# Patient Record
Sex: FEMALE | Race: BLACK OR AFRICAN AMERICAN | NOT HISPANIC OR LATINO | Employment: FULL TIME | URBAN - METROPOLITAN AREA
[De-identification: names, ages, dates, MRNs, and addresses within clinical notes are randomized per-mention and may not be internally consistent; named-entity substitution may affect disease eponyms.]

---

## 2017-01-11 ENCOUNTER — GENERIC CONVERSION - ENCOUNTER (OUTPATIENT)
Dept: OTHER | Facility: OTHER | Age: 22
End: 2017-01-11

## 2017-04-03 ENCOUNTER — ALLSCRIPTS OFFICE VISIT (OUTPATIENT)
Dept: OTHER | Facility: OTHER | Age: 22
End: 2017-04-03

## 2017-04-06 ENCOUNTER — ALLSCRIPTS OFFICE VISIT (OUTPATIENT)
Dept: OTHER | Facility: OTHER | Age: 22
End: 2017-04-06

## 2017-05-26 ENCOUNTER — ALLSCRIPTS OFFICE VISIT (OUTPATIENT)
Dept: OTHER | Facility: OTHER | Age: 22
End: 2017-05-26

## 2017-05-26 ENCOUNTER — HOSPITAL ENCOUNTER (OUTPATIENT)
Dept: RADIOLOGY | Facility: MEDICAL CENTER | Age: 22
Discharge: HOME | End: 2017-05-26
Payer: COMMERCIAL

## 2017-05-26 DIAGNOSIS — M25.562 PAIN IN LEFT KNEE: ICD-10-CM

## 2017-05-26 PROCEDURE — 73564 X-RAY EXAM KNEE 4 OR MORE: CPT

## 2017-05-30 ENCOUNTER — GENERIC CONVERSION - ENCOUNTER (OUTPATIENT)
Dept: OTHER | Facility: OTHER | Age: 22
End: 2017-05-30

## 2017-05-31 ENCOUNTER — ALLSCRIPTS OFFICE VISIT (OUTPATIENT)
Dept: OTHER | Facility: OTHER | Age: 22
End: 2017-05-31

## 2017-06-01 ENCOUNTER — HOSPITAL ENCOUNTER (EMERGENCY)
Facility: HOSPITAL | Age: 22
Discharge: HOME | End: 2017-06-01
Attending: EMERGENCY MEDICINE | Admitting: EMERGENCY MEDICINE
Payer: OTHER MISCELLANEOUS

## 2017-06-01 ENCOUNTER — APPOINTMENT (EMERGENCY)
Dept: RADIOLOGY | Facility: HOSPITAL | Age: 22
End: 2017-06-01
Payer: OTHER MISCELLANEOUS

## 2017-06-01 VITALS
SYSTOLIC BLOOD PRESSURE: 157 MMHG | BODY MASS INDEX: 23.54 KG/M2 | DIASTOLIC BLOOD PRESSURE: 84 MMHG | RESPIRATION RATE: 18 BRPM | WEIGHT: 150 LBS | HEIGHT: 67 IN | OXYGEN SATURATION: 100 % | TEMPERATURE: 98.2 F | HEART RATE: 75 BPM

## 2017-06-01 DIAGNOSIS — S63.636A SPRAIN OF INTERPHALANGEAL JOINT OF RIGHT LITTLE FINGER, INITIAL ENCOUNTER: Primary | ICD-10-CM

## 2017-06-01 PROCEDURE — 73140 X-RAY EXAM OF FINGER(S): CPT

## 2017-06-01 PROCEDURE — 99283 EMERGENCY DEPT VISIT LOW MDM: CPT

## 2017-06-01 RX ORDER — EPINEPHRINE 0.3 MG/.3ML
0.3 INJECTION SUBCUTANEOUS AS NEEDED
COMMUNITY
Start: 2016-11-28 | End: 2020-10-21 | Stop reason: SDUPTHER

## 2017-06-01 RX ORDER — ALBUTEROL SULFATE 90 UG/1
1 AEROSOL, METERED RESPIRATORY (INHALATION) AS NEEDED
COMMUNITY
End: 2018-04-09 | Stop reason: SDUPTHER

## 2017-06-01 RX ORDER — FLUTICASONE PROPIONATE 50 MCG
1 SPRAY, SUSPENSION (ML) NASAL DAILY
COMMUNITY
Start: 2016-11-28 | End: 2018-04-09 | Stop reason: SDUPTHER

## 2017-06-01 RX ORDER — IBUPROFEN 600 MG/1
600 TABLET ORAL ONCE
Status: COMPLETED | OUTPATIENT
Start: 2017-06-01 | End: 2017-06-01

## 2017-06-01 RX ADMIN — IBUPROFEN 600 MG: 600 TABLET ORAL at 01:29

## 2017-06-05 ENCOUNTER — APPOINTMENT (OUTPATIENT)
Dept: URGENT CARE | Facility: MEDICAL CENTER | Age: 22
End: 2017-06-05
Payer: OTHER MISCELLANEOUS

## 2017-06-05 PROCEDURE — 99283 EMERGENCY DEPT VISIT LOW MDM: CPT

## 2017-06-05 PROCEDURE — G0382 LEV 3 HOSP TYPE B ED VISIT: HCPCS

## 2017-08-28 ENCOUNTER — ALLSCRIPTS OFFICE VISIT (OUTPATIENT)
Dept: OTHER | Facility: OTHER | Age: 22
End: 2017-08-28

## 2017-08-28 DIAGNOSIS — Z34.90 ENCOUNTER FOR SUPERVISION OF NORMAL PREGNANCY: ICD-10-CM

## 2017-08-28 DIAGNOSIS — N92.1 EXCESSIVE AND FREQUENT MENSTRUATION WITH IRREGULAR CYCLE: ICD-10-CM

## 2017-08-28 LAB — HCG, QUALITATIVE (HISTORICAL): POSITIVE

## 2017-09-01 ENCOUNTER — GENERIC CONVERSION - ENCOUNTER (OUTPATIENT)
Dept: OTHER | Facility: OTHER | Age: 22
End: 2017-09-01

## 2017-09-07 ENCOUNTER — GENERIC CONVERSION - ENCOUNTER (OUTPATIENT)
Dept: OTHER | Facility: OTHER | Age: 22
End: 2017-09-07

## 2017-09-19 ENCOUNTER — ALLSCRIPTS OFFICE VISIT (OUTPATIENT)
Dept: OTHER | Facility: OTHER | Age: 22
End: 2017-09-19

## 2017-09-25 ENCOUNTER — GENERIC CONVERSION - ENCOUNTER (OUTPATIENT)
Dept: OTHER | Facility: OTHER | Age: 22
End: 2017-09-25

## 2017-09-25 ENCOUNTER — ALLSCRIPTS OFFICE VISIT (OUTPATIENT)
Dept: OTHER | Facility: OTHER | Age: 22
End: 2017-09-25

## 2017-09-28 ENCOUNTER — GENERIC CONVERSION - ENCOUNTER (OUTPATIENT)
Dept: OTHER | Facility: OTHER | Age: 22
End: 2017-09-28

## 2017-10-03 ENCOUNTER — GENERIC CONVERSION - ENCOUNTER (OUTPATIENT)
Dept: OTHER | Facility: OTHER | Age: 22
End: 2017-10-03

## 2017-10-03 ENCOUNTER — ALLSCRIPTS OFFICE VISIT (OUTPATIENT)
Dept: PERINATAL CARE | Facility: MEDICAL CENTER | Age: 22
End: 2017-10-03
Payer: COMMERCIAL

## 2017-10-03 PROCEDURE — 76813 OB US NUCHAL MEAS 1 GEST: CPT | Performed by: OBSTETRICS & GYNECOLOGY

## 2017-10-11 ENCOUNTER — GENERIC CONVERSION - ENCOUNTER (OUTPATIENT)
Dept: OTHER | Facility: OTHER | Age: 22
End: 2017-10-11

## 2017-10-19 ENCOUNTER — GENERIC CONVERSION - ENCOUNTER (OUTPATIENT)
Dept: OTHER | Facility: OTHER | Age: 22
End: 2017-10-19

## 2017-10-23 ENCOUNTER — ALLSCRIPTS OFFICE VISIT (OUTPATIENT)
Dept: OTHER | Facility: OTHER | Age: 22
End: 2017-10-23

## 2017-10-27 LAB
CHLAMYDIA TRACHOMATIS BY MOL. METHOD (HISTORICAL): DETECTED
GC BY MOL. METHOD (HISTORICAL): NOT DETECTED

## 2017-10-30 ENCOUNTER — APPOINTMENT (OUTPATIENT)
Dept: LAB | Facility: MEDICAL CENTER | Age: 22
End: 2017-10-30
Payer: COMMERCIAL

## 2017-10-30 ENCOUNTER — TRANSCRIBE ORDERS (OUTPATIENT)
Dept: ADMINISTRATIVE | Facility: HOSPITAL | Age: 22
End: 2017-10-30

## 2017-10-30 DIAGNOSIS — Z3A.17 17 WEEKS GESTATION OF PREGNANCY: Primary | ICD-10-CM

## 2017-10-30 PROCEDURE — 36415 COLL VENOUS BLD VENIPUNCTURE: CPT | Performed by: OBSTETRICS & GYNECOLOGY

## 2017-10-31 LAB — SCAN RESULT: NORMAL

## 2017-11-01 ENCOUNTER — GENERIC CONVERSION - ENCOUNTER (OUTPATIENT)
Dept: OTHER | Facility: OTHER | Age: 22
End: 2017-11-01

## 2017-11-19 ENCOUNTER — OFFICE VISIT (OUTPATIENT)
Dept: URGENT CARE | Facility: MEDICAL CENTER | Age: 22
End: 2017-11-19
Payer: COMMERCIAL

## 2017-11-19 DIAGNOSIS — N39.0 URINARY TRACT INFECTION: ICD-10-CM

## 2017-11-19 DIAGNOSIS — Z34.92 ENCOUNTER FOR SUPERVISION OF NORMAL PREGNANCY IN SECOND TRIMESTER: ICD-10-CM

## 2017-11-19 PROCEDURE — G0382 LEV 3 HOSP TYPE B ED VISIT: HCPCS

## 2017-11-19 PROCEDURE — 81002 URINALYSIS NONAUTO W/O SCOPE: CPT

## 2017-11-20 ENCOUNTER — GENERIC CONVERSION - ENCOUNTER (OUTPATIENT)
Dept: OTHER | Facility: OTHER | Age: 22
End: 2017-11-20

## 2017-11-20 ENCOUNTER — APPOINTMENT (OUTPATIENT)
Dept: PERINATAL CARE | Facility: CLINIC | Age: 22
End: 2017-11-20
Payer: COMMERCIAL

## 2017-11-20 ENCOUNTER — APPOINTMENT (OUTPATIENT)
Dept: LAB | Facility: HOSPITAL | Age: 22
End: 2017-11-20
Payer: COMMERCIAL

## 2017-11-20 DIAGNOSIS — N39.0 URINARY TRACT INFECTION: ICD-10-CM

## 2017-11-20 PROCEDURE — 76805 OB US >/= 14 WKS SNGL FETUS: CPT | Performed by: OBSTETRICS & GYNECOLOGY

## 2017-11-20 PROCEDURE — 76817 TRANSVAGINAL US OBSTETRIC: CPT | Performed by: OBSTETRICS & GYNECOLOGY

## 2017-11-20 PROCEDURE — 87086 URINE CULTURE/COLONY COUNT: CPT

## 2017-11-21 LAB — BACTERIA UR CULT: NORMAL

## 2017-11-22 NOTE — PROGRESS NOTES
Assessment    1  Urinary tract infection (599 0) (N39 0)    Plan  Acute UTI (urinary tract infection)    · (1) URINE CULTURE; Source:Urine, Clean Catch; Status:Active - Retrospective ByProtocol Authorization; Requested for:19Nov2017;   Urinary tract infection    · Nitrofurantoin Monohyd Macro 100 MG Oral Capsule (Macrobid); TAKE 1 CAPSULEEVERY 12 HOURS DAILY    Discussion/Summary  Discussion Summary:   1  Take Macrobid 100mg  1 tablet twice daily x 7 daysPush fluids3  Follow-up with PCP if symptoms persist    Medication Side Effects Reviewed: Possible side effects of new medications were reviewed with the patient/guardian today  Understands and agrees with treatment plan: The treatment plan was reviewed with the patient/guardian  The patient/guardian understands and agrees with the treatment plan      Chief Complaint    1  Pain  Chief Complaint Free Text Note Form: Presents with burning with urination and itching since Friday  History of Present Illness  HPI: The patient is a 72-year-old female who presents with pain painful urination x3 days  She denies any fever, abdominal or back pain, she denies any visible blood in her urine  The patient is currently 20 weeks pregnant and allergic to penicillins  Hospital Based Practices Required Assessment:  Pain Assessment  the patient states they have pain  The pain is located in the urinary  The patient describes the pain as burning  (on a scale of 0 to 10, the patient rates the pain at 8 )  Abuse And Domestic Violence Screen   Yes, the patient is safe at home  -- The patient states no one is hurting them  Depression And Suicide Screen  No, the patient has not had thoughts of hurting themself  No, the patient has not felt depressed in the past 7 days  Prefered Language is  ENglish  Primary Language is  English  Readiness To Learn: Receptive  Barriers To Learning: none    Preferred Learning: verbal      Review of Systems  Focused-Female:  Constitutional: No fever, no chills, feels well, no tiredness, no recent weight gain or loss  Genitourinary: dysuria  Active Problems  1  Acne (706 1) (L70 9)   2  Asthma (493 90) (J45 909)   3  Bee sting-induced anaphylaxis (989 5,E905 3) (T63 441A)   4  Chlamydia (079 98) (A74 9)   5  Chronic pain of left knee (222 12,087 88) (M25 562,G89 29)   6  Dry skin dermatitis (692 89) (L85 3)   7  Encounter for supervision of other normal pregnancy in first trimester (V22 1) (Z34 81)   8  Seasonal allergies (477 9) (J30 2)   9  Unplanned pregnancy (V22 2) (Z34 90)    Past Medical History  1  Asthma (493 90) (J45 909)   2  Bee sting-induced anaphylaxis (989 5,E905 3) (T63 441A)   3  Chronic pain of left knee (796 49,496 11) (M25 562,G89 29)   4  Dry skin dermatitis (692 89) (L85 3)   5  History of dysmenorrhea (V13 29) (Z87 42)   6  Seasonal allergies (477 9) (J30 2)  Active Problems And Past Medical History Reviewed: The active problems and past medical history were reviewed and updated today  Family History  Mother    1  Family history of hypertension (V17 49) (Z82 49)  Father    2  No pertinent family history  Family History Reviewed: The family history was reviewed and updated today  Social History     · Always uses seat belt   · Denied: History of Caffeine use   · Denied: History of Drug use   · Exercises regularly   · Denied: History of    · Never a smoker   · Nexplanon in place (V45 52) (Z97 5)   · No alcohol use   · Sexually active   · Single  Social History Reviewed: The social history was reviewed and updated today  Surgical History  Surgical History Reviewed: The surgical history was reviewed and updated today  Current Meds   1  EpiPen 2-James 0 3 MG/0 3ML Injection Solution Auto-injector; INJECT 0 3ML INTRAMUSCULARLY AS DIRECTED; Therapy: [id number] to (Last Rx:2016)  Requested for: 2016 Ordered   2   Fluticasone Propionate 50 MCG/ACT Nasal Suspension; USE 2 SPRAYS IN EACH NOSTRIL ONCE DAILY; Therapy: 61FKI3504 to (Last Rx:2016)  Requested for: 2016 Ordered   3  Prenatal TABS; Therapy: [phone] to Recorded   4  ProAir RespiClick 015 (90 Base) MCG/ACT Inhalation Aerosol Powder Breath Activated; Inhale 2 puffs every 4-6 hours as needed for cough or wheezing; Therapy: 65UET5852 to (Last Rx:2016)  Requested for: 2016 Ordered  Medication List Reviewed: The medication list was reviewed and updated today  Allergies  1  Penicillins    2  Bee sting   3  No Known Food Allergies    Vitals  Signs   Recorded: 80ORZ6309 04:37PM   Temperature: 98 6 F, Temporal  Heart Rate: 61  Pulse Quality: Normal  Respiration Quality: Normal  Respiration: 18  Systolic: 940, Sitting  Diastolic: 82, Sitting  O2 Saturation: 99, RA    Physical Exam   Constitutional  General appearance: No acute distress, well appearing and well nourished  Pulmonary  Respiratory effort: No increased work of breathing or signs of respiratory distress  Auscultation of lungs: Clear to auscultation  Cardiovascular  Auscultation of heart: Normal rate and rhythm, normal S1 and S2, without murmurs  Abdomen  Abdomen: Abnormal  -- The abdomen is soft and nondistended, positive bowel sounds in all quadrants  There is some slight tenderness to palpation over the suprapubic region  No CVA tenderness  Liver and spleen: No hepatomegaly or splenomegaly  Future Appointments    Date/Time Provider Specialty Site   2017 01:00 PM Yonis Ojeda [facility] Obstetrics/Gynecology [facility] Pending sale to [facility]   2017 10:00 AM  Mehreen Ramos, [address] [place] [address] Road   2017 01:40 PM JESSY Martini   Obstetrics/Gynecology [facility] OB/[facility] Pending sale to [facility]       Signatures   Electronically signed by : Janna Covington [facility]; 2017  4:55PM EST                       (Author)    Electronically signed by : ALEXANDRA Ramirez ; 2017 10:30AM EST (Co-author)

## 2017-12-14 ENCOUNTER — ALLSCRIPTS OFFICE VISIT (OUTPATIENT)
Dept: OTHER | Facility: OTHER | Age: 22
End: 2017-12-14

## 2017-12-17 LAB
CHLAMYDIA TRACHOMATIS BY MOL. METHOD (HISTORICAL): NOT DETECTED
GC BY MOL. METHOD (HISTORICAL): NOT DETECTED

## 2018-01-08 ENCOUNTER — APPOINTMENT (OUTPATIENT)
Dept: LAB | Facility: MEDICAL CENTER | Age: 23
End: 2018-01-08
Payer: COMMERCIAL

## 2018-01-08 DIAGNOSIS — Z34.92 ENCOUNTER FOR SUPERVISION OF NORMAL PREGNANCY IN SECOND TRIMESTER: ICD-10-CM

## 2018-01-08 LAB
BASOPHILS # BLD AUTO: 0.02 THOUSANDS/ΜL (ref 0–0.1)
BASOPHILS NFR BLD AUTO: 0 % (ref 0–1)
EOSINOPHIL # BLD AUTO: 0.21 THOUSAND/ΜL (ref 0–0.61)
EOSINOPHIL NFR BLD AUTO: 3 % (ref 0–6)
ERYTHROCYTE [DISTWIDTH] IN BLOOD BY AUTOMATED COUNT: 14.6 % (ref 11.6–15.1)
GLUCOSE 1H P 50 G GLC PO SERPL-MCNC: 79 MG/DL
HCT VFR BLD AUTO: 37.1 % (ref 34.8–46.1)
HGB BLD-MCNC: 13 G/DL (ref 11.5–15.4)
LYMPHOCYTES # BLD AUTO: 1.86 THOUSANDS/ΜL (ref 0.6–4.47)
LYMPHOCYTES NFR BLD AUTO: 25 % (ref 14–44)
MCH RBC QN AUTO: 31.9 PG (ref 26.8–34.3)
MCHC RBC AUTO-ENTMCNC: 35 G/DL (ref 31.4–37.4)
MCV RBC AUTO: 91 FL (ref 82–98)
MONOCYTES # BLD AUTO: 0.68 THOUSAND/ΜL (ref 0.17–1.22)
MONOCYTES NFR BLD AUTO: 9 % (ref 4–12)
NEUTROPHILS # BLD AUTO: 4.78 THOUSANDS/ΜL (ref 1.85–7.62)
NEUTS SEG NFR BLD AUTO: 63 % (ref 43–75)
NRBC BLD AUTO-RTO: 0 /100 WBCS
PLATELET # BLD AUTO: 171 THOUSANDS/UL (ref 149–390)
PMV BLD AUTO: 11.4 FL (ref 8.9–12.7)
RBC # BLD AUTO: 4.08 MILLION/UL (ref 3.81–5.12)
WBC # BLD AUTO: 7.6 THOUSAND/UL (ref 4.31–10.16)

## 2018-01-08 PROCEDURE — 82950 GLUCOSE TEST: CPT

## 2018-01-08 PROCEDURE — 86592 SYPHILIS TEST NON-TREP QUAL: CPT

## 2018-01-08 PROCEDURE — 85025 COMPLETE CBC W/AUTO DIFF WBC: CPT

## 2018-01-08 PROCEDURE — 36415 COLL VENOUS BLD VENIPUNCTURE: CPT

## 2018-01-09 LAB — RPR SER QL: NORMAL

## 2018-01-09 NOTE — RESULT NOTES
Verified Results  * XR KNEE 4+ VW LEFT INJURY [id number] 10:31AM Geovanna Luciano Order Number: [id number]     Test Name Result Flag Reference   XR KNEE 4+ VW LEFT (Report)     LEFT KNEE     INDICATION: Remote history of injury persistent pain     COMPARISON: None     VIEWS: 4     IMAGES: 4     FINDINGS:     There is no acute fracture or dislocation  There is no joint effusion  No degenerative changes  No lytic or blastic lesions are seen  Soft tissues are unremarkable         IMPRESSION:     Normal left knee exam       Workstation performed: [id number]     Signed by:   Pavel Colin MD   5/28/17

## 2018-01-09 NOTE — MISCELLANEOUS
Message   Recorded as Task   Date: 09/06/2017 04:40 PM, Created By: Florinda Tamayo   Task Name: Call Back   Assigned To: Darnell Nowak   Regarding Patient: Tessa Smiley, Status: In Progress   Comment:    Kaylie Miller - 06 Sep 2017 4:40 PM     TASK CREATED  Caller: Self; Results Inquiry; [phone] (Home)  pt would like her blood work resultswhicy were done at [facility],  pts # [phone] - 07 Sep 2017 7:21 AM     TASK EDITED  called pt- L/M on voicemail for pt to return call, for bld wk results,  prior to 330pm today   TatumSiddharth - 07 Sep 2017 7:22 AM     TASK IN PROGRESS   TatumSiddharth - 07 Sep 2017 12:45 PM     TASK EDITED  called pt- informed of lab results- quant is > 65,000   pt also had a +UPT in office  pt states she is going to continue with pregnancy  - has an apt on 9/19/17 for u s  Active Problems    1  Acne (706 1) (L70 9)   2  Asthma (493 90) (J45 909)   3  Bee sting-induced anaphylaxis (989 5,E905 3) (T63 441A)   4  Chronic pain of left knee (672 46,497 81) (M25 562,G89 29)   5  Dry skin dermatitis (692 89) (L85 3)   6  Dysmenorrhea (625 3) (N94 6)   7  Dyspareunia, female (625 0) (N94 10)   8  Encounter for management and injection of depo-Provera (V25 49) (Z30 42)   9  Irregular intermenstrual bleeding (626 6) (N92 1)   10  Seasonal allergies (477 9) (J30 2)    Current Meds   1  Clindamycin Phos-Benzoyl Perox 1-5 % External Gel (BenzaClin); APPLY AND GENTLY   MASSAGE INTO AFFECTED AREA(S) ONCE DAILY; Therapy: 79MZP5776 to (Last Rx:28Nov2016)  Requested for: 28Nov2016 Ordered   2  EpiPen 2-James 0 3 MG/0 3ML Injection Solution Auto-injector; INJECT 0 3ML   INTRAMUSCULARLY AS DIRECTED; Therapy: 96OAZ9756 to (Last Rx:28Nov2016)  Requested for: 28Nov2016 Ordered   3  Fluticasone Propionate 50 MCG/ACT Nasal Suspension; USE 2 SPRAYS IN EACH   NOSTRIL ONCE DAILY; Therapy: 35ELX5299 to (Last Rx:28Nov2016)  Requested for: 28Nov2016 Ordered   4   ProAir RespiClick 264 (90 Base) MCG/ACT Inhalation Aerosol Powder Breath Activated; Inhale 2 puffs every 4-6 hours as needed for cough or wheezing; Therapy: 62IGJ6498 to (Last Rx:28Nov2016)  Requested for: 28Nov2016 Ordered    Allergies    1  Penicillins    2   Bee sting    Signatures   Electronically signed by : Wai Borges RN; Sep  7 2017 12:47PM EST                       (Author)

## 2018-01-10 ENCOUNTER — HOSPITAL ENCOUNTER (EMERGENCY)
Facility: HOSPITAL | Age: 23
End: 2018-01-10
Attending: EMERGENCY MEDICINE
Payer: COMMERCIAL

## 2018-01-10 ENCOUNTER — HOSPITAL ENCOUNTER (OUTPATIENT)
Facility: HOSPITAL | Age: 23
Discharge: HOME | End: 2018-01-10
Attending: OBSTETRICS & GYNECOLOGY | Admitting: OBSTETRICS & GYNECOLOGY
Payer: COMMERCIAL

## 2018-01-10 ENCOUNTER — GENERIC CONVERSION - ENCOUNTER (OUTPATIENT)
Dept: OTHER | Facility: OTHER | Age: 23
End: 2018-01-10

## 2018-01-10 VITALS
HEART RATE: 93 BPM | WEIGHT: 162 LBS | RESPIRATION RATE: 19 BRPM | DIASTOLIC BLOOD PRESSURE: 67 MMHG | TEMPERATURE: 97.7 F | OXYGEN SATURATION: 98 % | BODY MASS INDEX: 24.55 KG/M2 | HEIGHT: 68 IN | SYSTOLIC BLOOD PRESSURE: 127 MMHG

## 2018-01-10 VITALS
SYSTOLIC BLOOD PRESSURE: 109 MMHG | HEART RATE: 73 BPM | TEMPERATURE: 98 F | DIASTOLIC BLOOD PRESSURE: 71 MMHG | RESPIRATION RATE: 18 BRPM

## 2018-01-10 DIAGNOSIS — O36.8190 DECREASED FETAL MOVEMENT: Primary | ICD-10-CM

## 2018-01-10 DIAGNOSIS — Z3A.27 27 WEEKS GESTATION OF PREGNANCY: ICD-10-CM

## 2018-01-10 PROBLEM — O09.30 INSUFFICIENT PRENATAL CARE: Status: ACTIVE | Noted: 2018-01-10

## 2018-01-10 PROCEDURE — 99284 EMERGENCY DEPT VISIT MOD MDM: CPT

## 2018-01-10 PROCEDURE — 99214 OFFICE O/P EST MOD 30 MIN: CPT

## 2018-01-10 PROCEDURE — 59025 FETAL NON-STRESS TEST: CPT | Performed by: OBSTETRICS & GYNECOLOGY

## 2018-01-10 PROCEDURE — 76815 OB US LIMITED FETUS(S): CPT | Performed by: OBSTETRICS & GYNECOLOGY

## 2018-01-10 NOTE — PROGRESS NOTES
Triage Note - OB  Anna Marte 23 y o  female MRN: [id number]  Unit/Bed#: LD Triage 2 Encounter: [id number]    Chief Complaint:   Chief Complaint   Patient presents with    Decreased Fetal Movement     since  yesterday     LICO: Estimated Date of Delivery: 18    HPI: 23 y o  female  at 27w3d presents with decreased fetal movement without feeling any movements at home  Pt is feeling some movements in triage  No LOF, no VB, no CTX  Vitals: Blood pressure 109/71, pulse 73, temperature 98 °F (36 7 °C), temperature source Oral, resp  rate 18, last menstrual period 2017  ,There is no height or weight on file to calculate BMI  Physical Exam  GEN: well developed and well nourished, alert, oriented times 3 and appears comfortable  Abd: soft, non tender and gravid  SVE: deferred  Abdominal u/s: vtx, anterior placenta, TU 16 9 (LVP 6 4), good fetal movements noted with patient only feeling ~25% of movements seen    FHR: 125, mod variability, +10x10 accels, no decels  Appropriate for gestational age  [de-identified]: no ctx    Labs:   No visits with results within 1 Day(s) from this visit     Latest known visit with results is:   Appointment on 2018   Component Date Value    Glucose 2018 79     RPR 2018 Non-Reactive     WBC 2018 7 60     RBC 2018 4 08     Hemoglobin 2018 13 0     Hematocrit 2018 37 1     MCV 2018 91     MCH 2018 31 9     MCHC 2018 35 0     RDW 2018 14 6     MPV 2018 11 4     Platelets  171     nRBC 2018 0     Neutrophils Relative 2018 63     Lymphocytes Relative 2018 25     Monocytes Relative 2018 9     Eosinophils Relative 2018 3     Basophils Relative 2018 0     Neutrophils Absolute 2018 4 78     Lymphocytes Absolute 2018 1 86     Monocytes Absolute 2018 0 68     Eosinophils Absolute 2018 0 21     Basophils Absolute 2018 0 02        Lab, Imaging and other studies: I have personally reviewed pertinent reports  A/P: 23 y o  female  at 27 2 wk, reassuring fetal status  1) Discharge instructions given to patient and labor precautions reviewed   D/w Dr Italo Horvath

## 2018-01-10 NOTE — ED PROVIDER NOTES
History  Chief Complaint   Patient presents with    Pregnancy Problem     pt ststes " I havent feel any movements for the last 24 hours and I am 27 wks pregnant"  Pt denies abd pain or other symptoms  Pt is a 19-year-old female  27 weeks with regular prenatal care presenting with chief complaint of decreased fetal motion  Patient is here with family member and reports that she has not felt the baby move her kick in 24 hours this is abnormal for her she typically feels movement every few hours  She has seen upon arriving to the emergency department or bedside ultrasound was placed patient had fetal heart rate of 140-150, positive fetal motion  Patient called her Ob it was instructed to go to at [place] but she came here instead  Mathew was called and instructed transfer down to [place] for fetal monitoring and further evaluation  The patient has very limited transportation and is agreeable to transfer by ambulance at this time will transfer hospitals otherwise she denies fevers viral symptoms headache chest pain cough shortness of breath new nausea vomiting anorexia abdominal pain flank or back pain pelvic pain cramping vaginal bleeding discharge urinary symptoms loss of water leg swelling calf pain tenderness rashes or other associated symptoms  Complete review systems otherwise negative            Prior to Admission Medications   Prescriptions Last Dose Informant Patient Reported? Taking?    Albuterol Sulfate (PROAIR RESPICLICK) 523 (90 Base) MCG/ACT AEPB   Yes No   Sig: Inhale 1 puff as needed   EPINEPHrine (EPIPEN 2-JANE) 0 3 mg/0 3 mL SOAJ   Yes No   Sig: Inject 0 3 mL as directed as needed   Norethin-Eth Estrad-Fe Biphas (LO LOESTRIN FE) 1 MG-10 MCG / 10 MCG TABS   Yes No   Sig: Take 1 mg by mouth daily   albuterol (PROVENTIL HFA,VENTOLIN HFA) 90 mcg/act inhaler   Yes No   Sig: Inhale 1 puff as needed   fluticasone (FLONASE) 50 mcg/act nasal spray   Yes No   Si spray into each nostril daily Facility-Administered Medications: None       Past Medical History:   Diagnosis Date    Asthma        History reviewed  No pertinent surgical history  History reviewed  No pertinent family history  I have reviewed and agree with the history as documented  Social History   Substance Use Topics    Smoking status: Never Smoker    Smokeless tobacco: Never Used    Alcohol use No        Review of Systems   Constitutional: Negative for chills and fever  HENT: Negative for rhinorrhea and sore throat  Respiratory: Negative for cough and shortness of breath  Cardiovascular: Negative for chest pain and palpitations  Gastrointestinal: Negative for abdominal pain, diarrhea, nausea and vomiting  Genitourinary: Negative for difficulty urinating, dysuria, flank pain, frequency, hematuria, menstrual problem, pelvic pain, urgency, vaginal bleeding, vaginal discharge and vaginal pain  Decreased fetal motion   Musculoskeletal: Negative for arthralgias, back pain and myalgias  Skin: Negative for color change and rash  Neurological: Negative for dizziness and weakness  Hematological: Negative for adenopathy  Does not bruise/bleed easily  Psychiatric/Behavioral: Negative for agitation and behavioral problems  All other systems reviewed and are negative  Physical Exam  ED Triage Vitals [01/10/18 1256]   Temperature Pulse Respirations Blood Pressure SpO2   97 7 °F (36 5 °C) 80 20 133/60 100 %      Temp Source Heart Rate Source Patient Position - Orthostatic VS BP Location FiO2 (%)   Temporal Monitor Sitting Left arm --      Pain Score       No Pain           Orthostatic Vital Signs  Vitals:    01/10/18 1256 01/10/18 1310 01/10/18 1425   BP: 133/60 131/67 127/67   Pulse: 80 88 93   Patient Position - Orthostatic VS: Sitting Lying        Physical Exam   Constitutional: She is oriented to person, place, and time  She appears well-developed and well-nourished  No distress     Well-appearing pleasant conversational no acute distress   HENT:   Head: Normocephalic and atraumatic  Eyes: EOM are normal  Pupils are equal, round, and reactive to light  Neck: Normal range of motion  Neck supple  No tracheal deviation present  Cardiovascular: Normal rate, regular rhythm and normal heart sounds  Exam reveals no gallop and no friction rub  No murmur heard  Pulmonary/Chest: Effort normal and breath sounds normal  She has no wheezes  She has no rales  Abdominal: Soft  Bowel sounds are normal  She exhibits no distension  There is no tenderness  There is no rebound and no guarding  Gravid uterus abdomen soft nontender   Musculoskeletal: Normal range of motion  She exhibits no edema or tenderness  Neurological: She is alert and oriented to person, place, and time  No cranial nerve deficit  She exhibits normal muscle tone  Coordination normal    Skin: Skin is warm and dry  No rash noted  Psychiatric: She has a normal mood and affect  Her behavior is normal    Nursing note and vitals reviewed        ED Medications  Medications - No data to display    Diagnostic Studies  Results Reviewed     None                 No orders to display              Procedures  Procedures       Phone Contacts  ED Phone Contact    ED Course  ED Course as of Siddharth 10 1446   Wed Siddharth 10, 2018   1333  Patient be transferred to [facility] for fetal monitoring and further evaluation bedside ultrasound performed demonstrated fetal heart rate between 140-150 beats per minute intermittent fetal movement                                MDM  Number of Diagnoses or Management Options  Decreased fetal movement:   Diagnosis management comments: 57-year-old female  27 weeks with regular prenatal care with decreased fetal motion over the last hour instructed to go to [place] however patient came here instead she was seen immediately prior to arriving the emergency department positive fetal motion, fetal heart rate of 140-150, discussed immediately with her Ob request transfer to [place] for further evaluation, patient agreeable has limited transportation will transferred for ambulance for her safety in her unborn child safety, she has no pelvic pain cramping back pain abdominal pain loss of water vaginal bleeding discharge urinary symptoms or other associated symptoms with this, patient agreeable, transfer pending    CritCare Time    Disposition  Final diagnoses:   Decreased fetal movement     Time reflects when diagnosis was documented in both MDM as applicable and the Disposition within this note     Time User Action Codes Description Comment    1/10/2018  1:31 PM Elkin Lima [O36 [id number]] Decreased fetal movement       ED Disposition     ED Disposition Condition Comment    Transfer to Another [facility] Jose Francisco Elkins 912 should be transferred out to [facility] labor and delivery Cheko Parikh MD Documentation    [address] Most Recent Value   Patient Condition  The patient has been stabilized such that within reasonable medical probability, no material deterioration of the patient condition or the condition of the unborn child(byron) is likely to result from the transfer   Reason for Transfer  Level of Care needed not available at this facility   Benefits of Transfer  Specialized equipment and/or services available at the receiving facility (Include comment)________________________   Risks of Transfer  Potential for delay in receiving treatment, Potential deterioration of medical condition, Loss of IV, Increased discomfort during transfer, Possible worsening of condition or death during transfer, Other: (Include comment)__________________________   Accepting Physician  [facility]   Accepting Facility Name, [address] and delivery   Sending MD Alex Copeland   Provider Certification  General risk, such as traffic hazards, adverse weather conditions, rough terrain or turbulence, possible failure of equipment (including vehicle or aircraft), or consequences of actions of persons outside the control of the transport personnel      RN Documentation    Flowsheet Row Most [address] Name, [address] and delivery      Follow-up Information    None       Discharge Medication List as of 1/10/2018  2:33 PM      CONTINUE these medications which have NOT CHANGED    Details   albuterol (PROVENTIL HFA,VENTOLIN HFA) 90 mcg/act inhaler Inhale 1 puff as needed, Until Discontinued, Historical Med      Albuterol Sulfate (PROAIR RESPICLICK) 434 (90 Base) MCG/ACT AEPB Inhale 1 puff as needed, Starting 11/28/2016, Until Discontinued, Historical Med      EPINEPHrine (EPIPEN 2-JANE) 0 3 mg/0 3 mL SOAJ Inject 0 3 mL as directed as needed, Starting 11/28/2016, Until Discontinued, Historical Med      fluticasone (FLONASE) 50 mcg/act nasal spray 1 spray into each nostril daily, Starting 11/28/2016, Until Discontinued, Historical Med      Norethin-Eth Estrad-Fe Biphas (LO LOESTRIN FE) 1 MG-10 MCG / 10 MCG TABS Take 1 mg by mouth daily, Starting 4/3/2017, Until Discontinued, Historical Med           No discharge procedures on file      ED Provider  Electronically Signed by           Holmes Meigs, DO  01/10/18 8455

## 2018-01-10 NOTE — DISCHARGE INSTRUCTIONS
Fetal Movement   WHAT YOU NEED TO KNOW:   Fetal movements are the kicks, rolls, and hiccups of your unborn baby  You may start to feel these movements when you are 20 weeks pregnant  The movements grow stronger and more frequent as your baby grows  Fetal movements show that your unborn baby is getting the oxygen and nutrients he needs before birth  Fewer fetal movements may signal a problem with your baby's health  DISCHARGE INSTRUCTIONS:   Follow up with your healthcare provider or obstetrician as directed:  Write down your questions so you remember to ask them during your visits  Normal fetal movement:  Fetal activity can be described by 4 states, from least to most active  During quiet sleep, your unborn baby may be still for up to 2 hours  During active sleep, he kicks, rolls, and moves often  During the quiet awake state, he may only move his eyes  The active awake state includes strong kicks and rolls  What affects fetal movement:  You may feel your baby move more after you eat, or after you drink caffeine  You may feel your baby move less while you are more active, such as when you exercise  You may also feel fewer movements if you are obese  Certain medicines can change your baby's movements  Tell your healthcare provider about the medicines you are taking  Track fetal movements at home:  Fetal movement is most often felt when you lie quietly on your side  Your healthcare provider may ask you to count movements for 2 hours  He may ask you to track how long it takes for your baby to move 10 times  Keep a log of your baby's movements  For more information:   · [employer]   [facility]  [address]  Phone: [phone]  Phone: [phone]  Web Address: http://[employer]/  org  Contact your healthcare provider or obstetrician if:   · It takes longer than usual to feel 10 of your unborn baby's movements       · You do not feel your unborn baby move at least 10 times in 2 hours  · The skin on your hands, feet, and around your eyes is more swollen than usual      · You have a headache for at least 24 hours  · Tiny red dots appear on your skin  · Your belly is tender when you press on it  · You have questions or concerns about your condition or care  Seek care immediately if:   · You do not feel your unborn baby move for 12 hours  · You feel cramping or constant pain in your abdomen  · You have heavy bleeding from your vagina  · You have a severe headache and cannot see clearly  · You are having trouble breathing or are vomiting  · You have a seizure  © 2017 [facility] Information is for End User's use only and may not be sold, redistributed or otherwise used for commercial purposes  All illustrations and images included in CareNotes® are the copyrighted property of [employer] , Inc  or Jacoby [employer]  The above information is an  only  It is not intended as medical advice for individual conditions or treatments  Talk to your doctor, nurse or pharmacist before following any medical regimen to see if it is safe and effective for you

## 2018-01-10 NOTE — PROCEDURES
Steve Jaimes, a  at 27w2d with an LICO of 2018, by Ultrasound, was seen at [facility] for the following procedure(s): $Procedure Type: TU, NST]    Nonstress Test  Reason for NST: Decreased Fetal Movement  Variability: Moderate  Decelerations: None  Accelerations: Yes  Acoustic Stimulator: No  Baseline: 125 BPM  Uterine Irritability: No  Contractions: Not present  Contraction Frequency (minutes): 0 min    4 Quadrant TU  TU Q1 (cm): 4 4 cm  TU Q2 (cm): 6 4 cm  TU Q3 (cm): 4 cm  TU Q4 (cm): 2 1 cm  TU TOTAL (cm): 16 9 cm  LVP (cm): 6 4 cm              Interpretation  Nonstress Test Interpretation: Reactive  Overall Impression: Reassuring

## 2018-01-10 NOTE — LETTER
January 10, 2018     Patient: Anabelle Huston   YOB: 1995   Date of Visit: 1/10/2018       To Whom It May Concern: This patient was evaluated at [facility] on 1/10/17 and should be excused from work for this day  It is my medical opinion that Anabelle Huston may return to work on 1/11/18  If you have any questions or concerns, please don't hesitate to call           Sincerely,    Dr Med Vidal  [phone]

## 2018-01-10 NOTE — EMTALA/ACUTE CARE TRANSFER
[address] [address]  Dept: [phone]      TTAYUY TRANSFER CONSENT    NAME Ember Mtz                                         1995                              MRN [id number]    I have been informed of my rights regarding examination, treatment, and transfer   by Dr Lila Vázquez DO    Benefits: Specialized equipment and/or services available at the receiving facility (Include comment)________________________    Risks: Potential for delay in receiving treatment, Potential deterioration of medical condition, Loss of IV, Increased discomfort during transfer, Possible worsening of condition or death during transfer, Other: (Include comment)__________________________      Consent for Transfer:  I acknowledge that my medical condition has been evaluated and explained to me by the emergency department physician or other qualified medical person and/or my attending physician, who has recommended that I be transferred to the service of  Accepting Physician: [facility] at [address] Name, [address] : [address] and delivery  The above potential benefits of such transfer, the potential risks associated with such transfer, and the probable risks of not being transferred have been explained to me, and I fully understand them  The doctor has explained that, in my case, the benefits of transfer outweigh the risks  I agree to be transferred  I authorize the performance of emergency medical procedures and treatments upon me in both transit and upon arrival at the receiving facility  Additionally, I authorize the release of any and all medical records to the receiving facility and request they be transported with me, if possible  I understand that the safest mode of transportation during a medical emergency is an ambulance and that the Hospital advocates the use of this mode of transport   Risks of traveling to the receiving facility by car, including absence of medical control, life sustaining equipment, such as oxygen, and medical personnel has been explained to me and I fully understand them  (WAI CORRECT BOX BELOW)  [  ]  I consent to the stated transfer and to be transported by ambulance/helicopter  [  ]  I consent to the stated transfer, but refuse transportation by ambulance and accept full responsibility for my transportation by car  I understand the risks of non-ambulance transfers and I exonerate the Hospital and its staff from any deterioration in my condition that results from this refusal     X___________________________________________    DATE  01/10/18  TIME________  Signature of patient or legally responsible individual signing on patient behalf           RELATIONSHIP TO PATIENT_________________________          Provider Certification    NAME Mariella Puckett                                         1995                              MRN [id number]    A medical screening exam was performed on the above named patient  Based on the examination:    Condition Necessitating Transfer The encounter diagnosis was Decreased fetal movement      Patient Condition: The patient has been stabilized such that within reasonable medical probability, no material deterioration of the patient condition or the condition of the unborn child(byron) is likely to result from the transfer    Reason for Transfer: Level of Care needed not available at this facility    Transfer Requirements: [address]  [facility] and delivery   · Space available and qualified personnel available for treatment as acknowledged by    · Agreed to accept transfer and to provide appropriate medical treatment as acknowledged by       [facility]  · Appropriate medical records of the examination and treatment of the patient are provided at the time of transfer   [address] _______  · Transfer will be performed by qualified personnel from    and appropriate transfer equipment as required, including the use of necessary and appropriate life support measures  Provider Certification: I have examined the patient and explained the following risks and benefits of being transferred/refusing transfer to the patient/family:  General risk, such as traffic hazards, adverse weather conditions, rough terrain or turbulence, possible failure of equipment (including vehicle or aircraft), or consequences of actions of persons outside the control of the transport personnel      Based on these reasonable risks and benefits to the patient and/or the unborn child(byron), and based upon the information available at the time of the patients examination, I certify that the medical benefits reasonably to be expected from the provision of appropriate medical treatments at another medical facility outweigh the increasing risks, if any, to the individuals medical condition, and in the case of labor to the unborn child, from effecting the transfer      X____________________________________________ DATE 01/10/18        TIME_______      ORIGINAL - SEND TO MEDICAL RECORDS   COPY - SEND WITH PATIENT DURING TRANSFER

## 2018-01-11 ENCOUNTER — GENERIC CONVERSION - ENCOUNTER (OUTPATIENT)
Dept: OTHER | Facility: OTHER | Age: 23
End: 2018-01-11

## 2018-01-11 ENCOUNTER — OB ABSTRACT (OUTPATIENT)
Dept: OBGYN CLINIC | Facility: CLINIC | Age: 23
End: 2018-01-11

## 2018-01-11 NOTE — RESULT NOTES
Verified Results  (1) SEQUENTIAL SCREEN 2 [id number] 03:39PM Salvador Muller     Test Name Result Flag Reference   SCAN RESULT      Results available in the [address] [place] [address]

## 2018-01-11 NOTE — MISCELLANEOUS
Message   Recorded as Task   Date: 10/19/2016 09:07 AM, Created By: Ruby Lomax   Task Name: Med Renewal Request   Assigned To: Guido Oakley   Regarding Patient: Nestor Patel, Status: Active   CommentMaire Draft - 19 Oct 2016 9:07 AM     TASK CREATED  Caller: Self; [phone] (Home)  called depo to pharm,pt bringing to apt today        Active Problems    1  Dysmenorrhea (625 3) (N94 6)   2  Dyspareunia, female (625 0) (N94 10)   3  Encounter for general counseling and advice on contraceptive management (V25 09)   (Z30 09)   4  Encounter for gynecological examination without abnormal finding (V72 31) (Z01 419)   5  Irregular intermenstrual bleeding (626 6) (N92 1)   6  Routine screening for STI (sexually transmitted infection) (V74 5) (Z11 3)    Current Meds   1  Albuterol AERS; Therapy: (Recorded:13Oct2016) to Recorded   2  Naproxen 250 MG Oral Tablet; TAKE 1 TABLET 4 TIMES DAILY AS NEEDED; Therapy: 94JNW8827 to (Evaluate:11Jan2017)  Requested for: 30CDG2448; Last   Rx:13Oct2016 Ordered   3  ZyrTEC Allergy CAPS; Therapy: (Recorded:13Oct2016) to Recorded    Allergies    1   Penicillins    Plan  Dysmenorrhea    · MedroxyPROGESTERone Acetate 150 MG/ML Intramuscular Suspension  (Depo-Provera); INJECT 1 ML INTRAMUSCULARLY ONCE EVERY 3  MONTHS    Signatures   Electronically signed by : Tina Owen, ; Oct 19 2016  9:07AM EST                       (Author)

## 2018-01-12 VITALS
WEIGHT: 150.13 LBS | HEART RATE: 60 BPM | RESPIRATION RATE: 16 BRPM | SYSTOLIC BLOOD PRESSURE: 90 MMHG | DIASTOLIC BLOOD PRESSURE: 60 MMHG

## 2018-01-12 NOTE — MISCELLANEOUS
Message   Recorded as Task   Date: 12/06/2016 02:30 PM, Created By: Alexey Yuan   Task Name: Care Coordination   Assigned To: Gabriel Ortega   Regarding Patient: Chaka Padilla, Status: In Progress   MarkieCallum Vang - 06 Dec 2016 2:30 PM     TASK CREATED  Please notify patient that urine studies show e  coli as offending organism  This should be sensitive to the antibiotic she was prescribed, thus she should complete the course of meds if she has not already done so  Continue to push fluids and call if sx are not fully resolved after meds are done  Thanks   Tatum,Sidhdarth - 06 Dec 2016 2:37 PM     TASK IN PROGRESS   Tatum,Siddharth - 06 Dec 2016 2:42 PM     TASK EDITED  called pt- informed of e  coli in urine  pt states "completed antibiotic" pt advised to continue increasing water intake & to call if becomes symptomatic again  Active Problems    1  Acne (706 1) (L70 9)   2  Asthma (493 90) (J45 909)   3  Bee sting-induced anaphylaxis (989 5,E905 3) (T63 441A)   4  Dry skin dermatitis (692 89) (L85 3)   5  Dysmenorrhea (625 3) (N94 6)   6  Dyspareunia, female (625 0) (N94 10)   7  Encounter for general counseling and advice on contraceptive management (V25 09)   (Z30 09)   8  Encounter for gynecological examination without abnormal finding (V72 31) (Z01 419)   9  Encounter for management and injection of depo-Provera (V25 49) (Z30 42)   10  Irregular intermenstrual bleeding (626 6) (N92 1)   11  Nexplanon removal (V25 43) (Z30 46)   12  Routine screening for STI (sexually transmitted infection) (V74 5) (Z11 3)   13  Seasonal allergies (477 9) (J30 2)    Current Meds   1  Clindamycin Phos-Benzoyl Perox 1-5 % External Gel (BenzaClin); APPLY AND GENTLY   MASSAGE INTO AFFECTED AREA(S) ONCE DAILY; Therapy: 90CQU9904 to (Last Rx:28Nov2016)  Requested for: 28Nov2016 Ordered   2  EpiPen 2-James 0 3 MG/0 3ML Injection Solution Auto-injector; INJECT 0 3ML   INTRAMUSCULARLY AS DIRECTED;    Therapy: 36BIZ5804 to (Last Rx:28Nov2016)  Requested for: 28Nov2016 Ordered   3  Fluticasone Propionate 50 MCG/ACT Nasal Suspension; USE 2 SPRAYS IN EACH   NOSTRIL ONCE DAILY; Therapy: 31WFN4878 to (Last Rx:28Nov2016)  Requested for: 28Nov2016 Ordered   4  MedroxyPROGESTERone Acetate 150 MG/ML Intramuscular Suspension   (Depo-Provera); INJECT 1 ML INTRAMUSCULARLY ONCE EVERY 3   MONTHS; Therapy: 01ZUN5746 to (Last Rx:19Oct2016)  Requested for: 19Oct2016 Ordered   5  ProAir RespiClick 093 (90 Base) MCG/ACT Inhalation Aerosol Powder Breath Activated; Inhale 2 puffs every 4-6 hours as needed for cough or wheezing; Therapy: 84FUU2139 to (Last Rx:28Nov2016)  Requested for: 28Nov2016 Ordered    Allergies    1  Penicillins    2   Bee sting    Signatures   Electronically signed by : Francisco Javier Winkler RN; Dec  6 2016  2:42PM EST                       (Author)

## 2018-01-12 NOTE — MISCELLANEOUS
Message   Recorded as Task   Date: 11/23/2016 10:27 AM, Created By: Aj Villalobos   Task Name: Call Back   Assigned To: Ehsan Mckeon   Regarding Patient: Merline Orn, Status: In Progress   Comment:    Aj Villalobos - 23 Nov 2016 10:27 AM     TASK CREATED  Caller: Self; Results Inquiry; [phone] (Home)  Patient went for urine test yesterday at [facility], she wants to know how long it takes to get the results  Aj Villalobos - 23 Nov 2016 11:59 AM     TASK EDITED  I called lab [facility], results are pending, they will fax a preliminary  [facility] - 23 Nov 2016 12:00 PM     TASK IN PROGRESS   [facility] - 23 Nov 2016 12:03 PM     TASK EDITED  made pt aware culture pending, still having uti sx macrobid sent-called to [facility]  Active Problems    1  Dysmenorrhea (625 3) (N94 6)   2  Dyspareunia, female (625 0) (N94 10)   3  Encounter for general counseling and advice on contraceptive management (V25 09)   (Z30 09)   4  Encounter for gynecological examination without abnormal finding (V72 31) (Z01 419)   5  Encounter for management and injection of depo-Provera (V25 49) (Z30 42)   6  Irregular intermenstrual bleeding (626 6) (N92 1)   7  Nexplanon removal (V25 43) (Z30 46)   8  Routine screening for STI (sexually transmitted infection) (V74 5) (Z11 3)   9  UTI symptoms (788 99) (R39 9)    Current Meds   1  Albuterol AERS; Therapy: (Recorded:13Oct2016) to Recorded   2  MedroxyPROGESTERone Acetate 150 MG/ML Intramuscular Suspension   (Depo-Provera); INJECT 1 ML INTRAMUSCULARLY ONCE EVERY 3   MONTHS; Therapy: 17HPN1350 to (Last Rx:19Oct2016)  Requested for: 19Oct2016 Ordered   3  Naproxen 250 MG Oral Tablet; TAKE 1 TABLET 4 TIMES DAILY AS NEEDED; Therapy: 79IBM1801 to (Evaluate:11Jan2017)  Requested for: 49NNY5665; Last   Rx:13Oct2016 Ordered   4  ZyrTEC Allergy CAPS; Therapy: (Recorded:13Oct2016) to Recorded    Allergies    1   Penicillins    Signatures   Electronically signed by : Riley Nunez LPN; Nov 23 5575 84:55FS EST                       (Author)

## 2018-01-12 NOTE — PROGRESS NOTES
OCT 3 2017         RE: Vaughan Keel                                 To: [facility]  MR#: [id number]                                  [address]   : [address] [facility]                                   [address]   ENC: [id number]                             Markie Amber, [address]   (Exam #: [id number])                           Fax: [phone]      The LMP of this 23year old,  G1, P0-0-0-0 patient was JUL 3 2017, giving   her an LICO of 2018 and a current gestational age of 17 weeks 1 day   by dates  A sonographic examination was performed on OCT 3 2017 using real   time equipment  The ultrasound examination was performed using abdominal   technique  The patient has a BMI of 23 0  Her blood pressure today was   121/65  Earliest US on record: 17 13W1D 18 LICO      Westley Carbajal is on prenatal vitamins and has an inhaler, EpiPen and fluticasone   propionate inhaler for her asthma  She received her flu shot in pregnancy  She reports an allergy to penicillin and bee stings  Other than asthma she   denies any other past medical history  She denies any use of tobacco,   alcohol or illicit drugs in pregnancy  She denies any past surgical   history or any first generation family history of hypertension, diabetes,   thrombosis or congenital anomalies  She is here today for her first   pregnancy and is requesting genetic screening  Multiple longitudinal and   transverse sections revealed a borja intrauterine pregnancy with the   fetus in vertex presentation  The placenta is anterior in implantation  Cardiac motion was observed at 145 bpm       INDICATIONS      first trimester screening      Exam Types      Level I      RESULTS      Fetus # 1 of 1   Vertex presentation      MEASUREMENTS (* Included In Average GA)      CRL              6 8 cm        12 weeks 6 days*   Nuchal Trans    2 00 mm      THE AVERAGE GESTATIONAL AGE is 12 weeks 6 days +/- 7 days        ANATOMY COMMENTS      Anatomic detail is limited at this gestational age  The yolk sac was not   noted  The fetal cranium appeared normal in shape and the nuchal   translucency was normal in size (2 0mm)  The nasal bone appears to be   present  The intracranial anatomy was unremarkable  Evaluation of the   spine revealed no obvious evidence for a neural tube defect  Anatomy of   the fetal thorax appeared within normal limits  The cardiac rhythm was   regular  Within the abdomen, stomach & bladder were visualized and the   abdominal wall appeared intact  A three vessel cord appears to be present  Active movement of the fetal body & extremities was seen  There is no   suspicion of a subchorionic bleed  The placental cord insertion was   normal    There is no suspicion of a uterine myoma  Free fluid is not seen   in the posterior cul-de-sac  ADNEXA      The left ovary was not visualized  The right ovary appeared normal and   measured 1 9 x 2 8 x 2 0 cm with a volume of 5 6 cc       AMNIOTIC FLUID         Largest Vertical Pocket = 3 3 cm   Amniotic Fluid: Normal      IMPRESSION      [facility] IUP   12 weeks and 6 days by this ultrasound  (LICO=2018)   Vertex presentation   Regular fetal heart rate of 145 bpm   Anterior placenta      David Wray      Dear Chito King      Thank you for requesting a  consultation on your patient Ms Karley Hyde for the following indications: sequential screen      The patient was informed of the findings and counseled about the   limitations of the exam in detecting all forms of fetal congenital   abnormalities  She denies any vaginal bleeding or uterine cramping/contractions  Today's ultrasound findings and suggested follow-up were discussed in   detail with the patient  The Sequential Screen was discussed in detail,   including the sensitivities for detection of Down syndrome, Trisomy 18,   and open neural tube defects    The patient underwent fingerstick blood   collection for hCG and YUE-A to complete the initial component of the   Sequential Screen  Results should be available within one week  Follow up recommended:   1  Follow-up multiple marker serum screening at 16-18 weeks' gestation is   recommended to complete the Sequential Screen  2  Fetal Level II ultrasound imaging is recommended at 19-20 weeks'   gestation  BERTHA Bernal , JESSY Tejeda     Maternal-Fetal Medicine   Electronically signed 10/03/17 18:42

## 2018-01-13 VITALS
TEMPERATURE: 98.5 F | BODY MASS INDEX: 23.36 KG/M2 | HEART RATE: 58 BPM | WEIGHT: 151.38 LBS | RESPIRATION RATE: 16 BRPM | SYSTOLIC BLOOD PRESSURE: 102 MMHG | DIASTOLIC BLOOD PRESSURE: 64 MMHG

## 2018-01-13 VITALS
BODY MASS INDEX: 22.43 KG/M2 | WEIGHT: 148 LBS | DIASTOLIC BLOOD PRESSURE: 74 MMHG | HEIGHT: 68 IN | SYSTOLIC BLOOD PRESSURE: 110 MMHG

## 2018-01-13 NOTE — MISCELLANEOUS
Message   Recorded as Task   Date: 09/06/2017 04:40 PM, Created By: Karen Kinney   Task Name: Call Back   Assigned To: Judi Alvarenga   Regarding Patient: Antoinette Dailey, Status: Active   Comment:    Kaylie Miller - 06 Sep 2017 4:40 PM     TASK CREATED  Caller: Self; Results Inquiry; [phone] (Home)  pt would like her blood work resultswhicy were done at [facility],  pts # [phone] - 07 Sep 2017 7:21 AM     TASK EDITED  called pt- L/M on voicemail for pt to return call, for bld wk results,  prior to 330pm today        Active Problems    1  Acne (706 1) (L70 9)   2  Asthma (493 90) (J45 909)   3  Bee sting-induced anaphylaxis (989 5,E905 3) (T63 441A)   4  Chronic pain of left knee (798 13,090 04) (M25 562,G89 29)   5  Dry skin dermatitis (692 89) (L85 3)   6  Dysmenorrhea (625 3) (N94 6)   7  Dyspareunia, female (625 0) (N94 10)   8  Encounter for management and injection of depo-Provera (V25 49) (Z30 42)   9  Irregular intermenstrual bleeding (626 6) (N92 1)   10  Seasonal allergies (477 9) (J30 2)    Current Meds   1  Clindamycin Phos-Benzoyl Perox 1-5 % External Gel (BenzaClin); APPLY AND GENTLY   MASSAGE INTO AFFECTED AREA(S) ONCE DAILY; Therapy: 19YYF3598 to (Last Rx:28Nov2016)  Requested for: 28Nov2016 Ordered   2  EpiPen 2-James 0 3 MG/0 3ML Injection Solution Auto-injector; INJECT 0 3ML   INTRAMUSCULARLY AS DIRECTED; Therapy: 36ZRO7654 to (Last Rx:28Nov2016)  Requested for: 28Nov2016 Ordered   3  Fluticasone Propionate 50 MCG/ACT Nasal Suspension; USE 2 SPRAYS IN EACH   NOSTRIL ONCE DAILY; Therapy: 48DGI8178 to (Last Rx:28Nov2016)  Requested for: 28Nov2016 Ordered   4  ProAir RespiClick 030 (90 Base) MCG/ACT Inhalation Aerosol Powder Breath Activated; Inhale 2 puffs every 4-6 hours as needed for cough or wheezing; Therapy: 00SOQ3906 to (Last Rx:28Nov2016)  Requested for: 28Nov2016 Ordered    Allergies    1  Penicillins    2   Bee sting    Signatures   Electronically signed by : Jaswinder Mendenhall RN; Sep  7 2017  7:22AM EST                       (Author)

## 2018-01-14 VITALS
HEIGHT: 68 IN | WEIGHT: 148 LBS | SYSTOLIC BLOOD PRESSURE: 124 MMHG | DIASTOLIC BLOOD PRESSURE: 70 MMHG | BODY MASS INDEX: 22.43 KG/M2

## 2018-01-14 VITALS
WEIGHT: 149 LBS | HEIGHT: 68 IN | BODY MASS INDEX: 22.58 KG/M2 | SYSTOLIC BLOOD PRESSURE: 120 MMHG | DIASTOLIC BLOOD PRESSURE: 74 MMHG

## 2018-01-14 VITALS — WEIGHT: 151 LBS | SYSTOLIC BLOOD PRESSURE: 124 MMHG | BODY MASS INDEX: 23.65 KG/M2 | DIASTOLIC BLOOD PRESSURE: 66 MMHG

## 2018-01-15 ENCOUNTER — GENERIC CONVERSION - ENCOUNTER (OUTPATIENT)
Dept: OTHER | Facility: OTHER | Age: 23
End: 2018-01-15

## 2018-01-15 ENCOUNTER — ALLSCRIPTS OFFICE VISIT (OUTPATIENT)
Dept: OTHER | Facility: OTHER | Age: 23
End: 2018-01-15

## 2018-01-15 NOTE — MISCELLANEOUS
Message   Recorded as Task   Date: 11/01/2017 05:13 PM, Created By: Bessie Dumont   Task Name: Call Back   Assigned To: Yair Hopson   Regarding Patient: Sinai Lima, Status: In Progress   Comment:    Josie Daugherty - 01 Nov 2017 5:13 PM     TASK CREATED  Pt called office today, left voicemail message  Pt's LICO is 4/9/18, GA 17-2  Pt saw AIDAN Jackson on 10/23/17, scheduled to see Dr Chante Chavez on 11/20/17  Pt states she wants nursing staff to return her call, wouldn't state what her questions are  Pt can be reached @ [phone]  Thank you! TatumJan - 01 Nov 2017 6:08 PM     TASK IN PROGRESS   TatumJan - 01 Nov 2017 6:20 PM     TASK EDITED  returned pts' call- pt had questions regarding her recent diagnosis of chlamydia  pt did not  rx as of yet  - advised pt to  rx asap & take as directed  also, pt should abstain from IC until one week after partner is tx'd   pt verbalized understanding - will take azithromycin tonight & abstain from sex until 1 wk after partner is tx'd  pt to have ROSITA at next PN appt  Active Problems    1  Acne (706 1) (L70 9)   2  Asthma (493 90) (J45 909)   3  Bee sting-induced anaphylaxis (989 5,E905 3) (T63 441A)   4  Chlamydia (079 98) (A74 9)   5  Chronic pain of left knee (214 54,853 80) (M25 562,G89 29)   6  Dry skin dermatitis (692 89) (L85 3)   7  Encounter for supervision of other normal pregnancy in first trimester (V22 1) (Z34 81)   8  Seasonal allergies (477 9) (J30 2)   9  Unplanned pregnancy (V22 2) (Z34 90)    Current Meds   1  Azithromycin 500 MG Oral Tablet; TAKE 2 TABLET ONCE; Therapy: 26HYD4659 to (Evaluate:02Nov2017)  Requested for: 01UNL3532; Last   Rx:01Nov2017 Ordered   2  EpiPen 2-James 0 3 MG/0 3ML Injection Solution Auto-injector; INJECT 0 3ML   INTRAMUSCULARLY AS DIRECTED; Therapy: 04GKM0451 to (Last Rx:28Nov2016)  Requested for: 28Nov2016 Ordered   3   Fluticasone Propionate 50 MCG/ACT Nasal Suspension; USE 2 SPRAYS IN EACH   NOSTRIL ONCE DAILY; Therapy: 76WZM4651 to (Last Rx:28Nov2016)  Requested for: 28Nov2016 Ordered   4  Prenatal TABS; Therapy: [phone]) to Recorded   5  ProAir RespiClick 082 (90 Base) MCG/ACT Inhalation Aerosol Powder Breath Activated; Inhale 2 puffs every 4-6 hours as needed for cough or wheezing; Therapy: 44WDG6797 to (Last Rx:28Nov2016)  Requested for: 28Nov2016 Ordered    Allergies    1  Penicillins    2  Bee sting   3   No Known Food Allergies    Signatures   Electronically signed by : Duke Rao RN; Nov 1 2017  6:20PM EST                       (Author)

## 2018-01-15 NOTE — MISCELLANEOUS
Message   Recorded as Task   Date: 11/21/2016 12:35 PM, Created By: Kelly Hamlin   Task Name: Medical Complaint Callback   Assigned To: Zeinab Chavez   Regarding Patient: Earl Wagoner, Status: In Progress   Comment:    Ingrid Sheets - 21 Nov 2016 12:35 PM     TASK CREATED  Caller: Self; Medical Complaint; [phone] (Mobile Phone)  Pt called w complaing of possible UTI  Pt is @ [phone]   [facility] - 21 Nov 2016 1:35 PM     TASK IN PROGRESS   [facility] - 21 Nov 2016 1:41 PM     TASK EDITED  pt states she started with burning upon urination with some hematuria  no dischare  does not ahve her period and is not due for her period any time soon  advised to go for UA C&S pt doesnt know where to go she will find out through insurance which lab she needs to go to  [facility] - 21 Nov 2016 1:42 PM     TASK EDITED  rx in chart need lab to fax to  [facility] - 22 Nov 2016 8:26 AM     TASK EDITED  pt going to [facility]- faxed to   [phone]  Active Problems    1  Dysmenorrhea (625 3) (N94 6)   2  Dyspareunia, female (625 0) (N94 10)   3  Encounter for general counseling and advice on contraceptive management (V25 09)   (Z30 09)   4  Encounter for gynecological examination without abnormal finding (V72 31) (Z01 419)   5  Encounter for management and injection of depo-Provera (V25 49) (Z30 42)   6  Irregular intermenstrual bleeding (626 6) (N92 1)   7  Nexplanon removal (V25 43) (Z30 46)   8  Routine screening for STI (sexually transmitted infection) (V74 5) (Z11 3)   9  UTI symptoms (788 99) (R39 9)    Current Meds   1  Albuterol AERS; Therapy: (Recorded:13Oct2016) to Recorded   2  MedroxyPROGESTERone Acetate 150 MG/ML Intramuscular Suspension   (Depo-Provera); INJECT 1 ML INTRAMUSCULARLY ONCE EVERY 3   MONTHS; Therapy: 45QLW5812 to (Last Rx:19Oct2016)  Requested for: 19Oct2016 Ordered   3  Naproxen 250 MG Oral Tablet; TAKE 1 TABLET 4 TIMES DAILY AS NEEDED;    Therapy: 40EXW9558 to (Evaluate:11Jan2017)  Requested for: 84RYL2462; Last   Rx:13Oct2016 Ordered   4  ZyrTEC Allergy CAPS; Therapy: (Recorded:13Oct2016) to Recorded    Allergies    1   Penicillins    Signatures   Electronically signed by : Norman Miramontes LPN; Nov 22 3670  5:15HX EST                       (Author)

## 2018-01-15 NOTE — MISCELLANEOUS
Message  Return to work or school:   Elise Soares is under my professional care  She was seen in my office on 10/19/2016   She is able to return to work on  10/19/2016      Ron Puri was seen in our office today for a procedure and is recommended to have light lifting duty today, no more than 15 pounds          Signatures   Electronically signed by : Paulina Gorman, ; Oct 19 2016  1:45PM EST                       (Author)

## 2018-01-15 NOTE — MISCELLANEOUS
Message   Recorded as Task   Date: 10/11/2017 12:15 PM, Created By: Yessi Gutiérrez   Task Name: Call Back   Assigned To: Suze Saha   Regarding Patient: Oralee Daughters, Status: In Progress   Comment:    Zoey Rosado - 11 Oct 2017 12:15 PM     TASK CREATED  PT HAS AN APPT AT THE [facility] OFFICE TODAY AND THEY NEED A NOTE WITH DUE DATE AND HOW MANY MONTHS SHE IS NOW  FAX # [phone]  [phone]   Stephanie Aparicio - 11 Oct 2017 12:48 PM     TASK IN PROGRESS   Stephanie Aparicio - 11 Oct 2017 12:53 PM     TASK EDITED  Note faxed to [facility] office   Pt aware        Signatures   Electronically signed by :  Shobha Batista, ; Oct 11 2017 12:54PM EST                       (Author)

## 2018-01-16 NOTE — MISCELLANEOUS
Message  Pt is due 04/09/1916 and presently is 14 weeks, 2 days pregnant  Signatures   Electronically signed by :  Maribel Batista, ; Oct 11 2017 12:51PM EST                       (Author)

## 2018-01-16 NOTE — PROGRESS NOTES
Assessment    1  Encounter for preventive health examination (V70 0) (Z00 00)    Plan  Health Maintenance    · Follow-up visit in 1 year Evaluation and Treatment  Follow-up  Status: Hold For -  Scheduling  Requested for: 06Apr2017   · *VB-Depression Screening; Status:Complete;   Done: 22ABL4811 09:16AM    Discussion/Summary  health maintenance visit Currently, she eats a healthy diet and has an adequate exercise regimen  cervical cancer screening is not indicated Breast cancer screening: breast cancer screening is not indicated  Colorectal cancer screening: colorectal cancer screening is not indicated  Osteoporosis screening: bone mineral density testing is not indicated  Screening lab work includes Defer for this year  The immunizations are up to date and except the flu vaccine for this year but a bit late in the season to get it now  Advice and education were given regarding nutrition, aerobic exercise, reproductive health and contraception  Patient discussion: discussed with the patient  Will completed form once PPD results are available for review  F/U in 1yr or sooner if needed  Chief Complaint  Work Physical  Needs 2 step PPD  Can get through employer      History of Present Illness  HM, Adult Female: The patient is being seen for a health maintenance evaluation  The last health maintenance visit was 1 year(s) ago  General Health: The patient's health since the last visit is described as good  She has regular dental visits  She complains of vision problems  Vision care includes wearing glasses and wearing soft contact lenses  She denies hearing loss  Immunizations status: not up to date   Tdap UTD but did not get flu vaccine this year  Lifestyle:  She consumes a diverse and healthy diet  She does not have any weight concerns  She exercises regularly  She does not use tobacco  She denies alcohol use  She denies drug use     Screening:   HPI: Pt presents for a physical  Has a form for work as well  Will however be getting a 2 step PPD at work  No acute concerns  Review of Systems    Constitutional: no fever  Eyes: no eyesight problems  ENT: no earache and no nasal discharge  Cardiovascular: no chest pain  Respiratory: no shortness of breath  Gastrointestinal: no abdominal pain and no blood in stools  Genitourinary: no dysuria  Musculoskeletal: no arthralgias and no myalgias  Integumentary: no rashes  Neurological: no headache  Psychiatric: no anxiety and no depression  Over the past 2 weeks, how often have you been bothered by the following problems? 1 ) Little interest or pleasure in doing things? Not at all    2 ) Feeling down, depressed or hopeless? Not at all  Active Problems    1  Acne (706 1) (L70 9)   2  Asthma (493 90) (J45 909)   3  Bee sting-induced anaphylaxis (989 5,E905 3) (T63 441A)   4  Dry skin dermatitis (692 89) (L85 3)   5  Dysmenorrhea (625 3) (N94 6)   6  Dyspareunia, female (625 0) (N94 10)   7  Encounter for management and injection of depo-Provera (V25 49) (Z30 42)   8  Irregular intermenstrual bleeding (626 6) (N92 1)   9  Seasonal allergies (477 9) (J30 2)    Past Medical History    · History of  0 (V49 89) (Z78 9)    Surgical History    · History of Progestrogens Etonogestrel    Family History  Mother    · Family history of hypertension (V17 49) (Z82 49)  Father    · No pertinent family history    Social History    · Always uses seat belt   · Denied: History of Caffeine use   · Denied: History of Drug use   · Exercises regularly   · Denied: History of    · Never a smoker   · Nexplanon in place (V45 52) (Z97 5)   · No alcohol use   · Sexually active   · Single    Current Meds   1  Clindamycin Phos-Benzoyl Perox 1-5 % External Gel; APPLY AND GENTLY MASSAGE   INTO AFFECTED AREA(S) ONCE DAILY; Therapy: 00RTI0868 to (Last Rx:2016)  Requested for: 2016 Ordered   2   EpiPen 2-James 0 3 MG/0 3ML Injection Solution Auto-injector; INJECT 0 3ML   INTRAMUSCULARLY AS DIRECTED; Therapy: 22RVX4653 to (Last Rx:28Nov2016)  Requested for: 28Nov2016 Ordered   3  Fluticasone Propionate 50 MCG/ACT Nasal Suspension; USE 2 SPRAYS IN EACH   NOSTRIL ONCE DAILY; Therapy: 99YVG2354 to (Last Rx:28Nov2016)  Requested for: 28Nov2016 Ordered   4  Lo Loestrin Fe 1 MG-10 MCG / 10 MCG Oral Tablet; TAKE 1 TABLET DAILY AS   DIRECTED; Therapy: 51VYX3753 to (Evaluate:56Swj3167)  Requested for: 03Apr2017; Last   Rx:03Apr2017 Ordered   5  Naproxen 250 MG Oral Tablet; Therapy: (Recorded:03Apr2017) to Recorded   6  ProAir RespiClick 323 (90 Base) MCG/ACT Inhalation Aerosol Powder Breath Activated; Inhale 2 puffs every 4-6 hours as needed for cough or wheezing; Therapy: 39BTG5230 to (Last Rx:28Nov2016)  Requested for: 28Nov2016 Ordered    Allergies    1  Penicillins    2  Bee sting    Vitals   Recorded: 81SPG8034 08:57AM   Heart Rate 60   Respiration 16   Systolic 90   Diastolic 60   Weight 843 lb 2 08 oz     Physical Exam    Constitutional   General appearance: No acute distress, well appearing and well nourished  Head and Face   Head and face: Normal     Eyes   Conjunctiva and lids: No swelling, erythema or discharge  Pupils and irises: Equal, round, reactive to light  Ears, Nose, Mouth, and Throat   External inspection of ears and nose: Normal     Otoscopic examination: Tympanic membranes translucent with normal light reflex  Canals patent without erythema  Nasal mucosa, septum, and turbinates: Normal without edema or erythema  Lips, teeth, and gums: Normal, good dentition  Oropharynx: Normal with no erythema, edema, exudate or lesions  Neck   Neck: Supple, symmetric, trachea midline, no masses  Thyroid: Normal, no thyromegaly  Pulmonary   Respiratory effort: No increased work of breathing or signs of respiratory distress  Auscultation of lungs: Clear to auscultation      Cardiovascular   Auscultation of heart: Normal rate and rhythm, normal S1 and S2, no murmurs  Examination of extremities for edema and/or varicosities: Normal     Abdomen   Abdomen: Non-tender, no masses  Liver and spleen: No hepatomegaly or splenomegaly  Lymphatic   Palpation of lymph nodes in neck: No lymphadenopathy  Musculoskeletal   Gait and station: Normal     Neurologic   Cranial nerves: Cranial nerves II-XII intact  Psychiatric   Orientation to person, place, and time: Normal     Mood and affect: Normal        Future Appointments    Date/Time Provider Specialty Site   05/31/2017 10:15 AM JESSY Ballard   [address] [facility]   07/05/2017 10:40 AM Cesar Hightower CNM Obstetrics/Gynecology [facility]     Signatures   Electronically signed by : JESSY Arroyo ; Apr 6 2017  9:19AM EST                       (Author)

## 2018-01-17 NOTE — MISCELLANEOUS
Message   Recorded as Task   Date: 09/01/2017 10:18 AM, Created By: Gunjan Bella   Task Name: Call Back   Assigned To: Violet Rodriguez   Regarding Patient: Brigida Dawson, Status: In Progress   Comment:    Zoey Rosado - 01 Sep 2017 10:18 AM     TASK CREATED  PT CALLED STATING THAT SHE WAS SPOTTING THIS MORNING  PINKISH/RED  [phone]   [facility] - 01 Sep 2017 10:25 AM     TASK IN PROGRESS   [facility] - 01 Sep 2017 10:46 AM     TASK EDITED  Spoke with pt  States she had 1 episode of light pink spotting on TP when wiping this AM  Denies further bleeding or spotting  Denies strong cramping - she does have occasional mild cramping  Advised pt to hydrate, rest and monitor  To c/b with worsening sx  Pt scheduled for u/s next week  Active Problems    1  Acne (706 1) (L70 9)   2  Asthma (493 90) (J45 909)   3  Bee sting-induced anaphylaxis (989 5,E905 3) (T63 441A)   4  Chronic pain of left knee (417 91,128 64) (M25 562,G89 29)   5  Dry skin dermatitis (692 89) (L85 3)   6  Dysmenorrhea (625 3) (N94 6)   7  Dyspareunia, female (625 0) (N94 10)   8  Encounter for management and injection of depo-Provera (V25 49) (Z30 42)   9  Irregular intermenstrual bleeding (626 6) (N92 1)   10  Seasonal allergies (477 9) (J30 2)    Current Meds   1  Clindamycin Phos-Benzoyl Perox 1-5 % External Gel (BenzaClin); APPLY AND GENTLY   MASSAGE INTO AFFECTED AREA(S) ONCE DAILY; Therapy: 36EHV8277 to (Last Rx:28Nov2016)  Requested for: 28Nov2016 Ordered   2  EpiPen 2-James 0 3 MG/0 3ML Injection Solution Auto-injector; INJECT 0 3ML   INTRAMUSCULARLY AS DIRECTED; Therapy: 58SER3760 to (Last Rx:28Nov2016)  Requested for: 28Nov2016 Ordered   3  Fluticasone Propionate 50 MCG/ACT Nasal Suspension; USE 2 SPRAYS IN EACH   NOSTRIL ONCE DAILY; Therapy: 82ZHG8581 to (Last Rx:28Nov2016)  Requested for: 28Nov2016 Ordered   4  ProAir RespiClick 486 (90 Base) MCG/ACT Inhalation Aerosol Powder Breath Activated;    Inhale 2 puffs every 4-6 hours as needed for cough or wheezing; Therapy: 45SEI1658 to (Last Rx:28Nov2016)  Requested for: 28Nov2016 Ordered    Allergies    1  Penicillins    2   Bee sting    Signatures   Electronically signed by : Shawn Moody, ; Sep  1 2017 10:46AM EST                       (Author)

## 2018-01-18 NOTE — MISCELLANEOUS
Message   Recorded as Task   Date: 09/01/2017 11:54 AM, Created By: Milena Savage   Task Name: Call Back   Assigned To: Rakel Mcnally   Regarding Patient: Radhames Mcelroy, Status: In Progress   Comment:    IndraCaron - 01 Sep 2017 11:54 AM     TASK CREATED  blood type is o positive, quant is elevated  Looks like she has an US with us next week - told jon she wanted a VIP  Is this correct that she is coming in next week? If so should be able to see a pregnancy on ultrasound next week  Josie Daugherty - 01 Sep 2017 11:58 AM     TASK IN PROGRESS   Emily Margoth - 01 Sep 2017 12:02 PM     TASK EDITED  I spoke with pt this morning and she is aware of lab results  Upon question she stated she did want to schedule prenatal care in our office  She did not mention VIP    Will await apt next week  Active Problems    1  Acne (706 1) (L70 9)   2  Asthma (493 90) (J45 909)   3  Bee sting-induced anaphylaxis (989 5,E905 3) (T63 441A)   4  Chronic pain of left knee (075 50,441 28) (M25 562,G89 29)   5  Dry skin dermatitis (692 89) (L85 3)   6  Dysmenorrhea (625 3) (N94 6)   7  Dyspareunia, female (625 0) (N94 10)   8  Encounter for management and injection of depo-Provera (V25 49) (Z30 42)   9  Irregular intermenstrual bleeding (626 6) (N92 1)   10  Seasonal allergies (477 9) (J30 2)    Current Meds   1  Clindamycin Phos-Benzoyl Perox 1-5 % External Gel (BenzaClin); APPLY AND GENTLY   MASSAGE INTO AFFECTED AREA(S) ONCE DAILY; Therapy: 72EUN4207 to (Last Rx:28Nov2016)  Requested for: 28Nov2016 Ordered   2  EpiPen 2-James 0 3 MG/0 3ML Injection Solution Auto-injector; INJECT 0 3ML   INTRAMUSCULARLY AS DIRECTED; Therapy: 28KME3247 to (Last Rx:28Nov2016)  Requested for: 28Nov2016 Ordered   3  Fluticasone Propionate 50 MCG/ACT Nasal Suspension; USE 2 SPRAYS IN EACH   NOSTRIL ONCE DAILY; Therapy: 27NVN7781 to (Last Rx:28Nov2016)  Requested for: 28Nov2016 Ordered   4   ProAir RespiClick 409 (90 Base) MCG/ACT Inhalation Aerosol Powder Breath Activated; Inhale 2 puffs every 4-6 hours as needed for cough or wheezing; Therapy: 30PFD4371 to (Last Rx:28Nov2016)  Requested for: 28Nov2016 Ordered    Allergies    1  Penicillins    2   Bee sting    Signatures   Electronically signed by : Ana Rosa King, ; Sep  1 2017 12:21PM EST                       (Author)

## 2018-01-18 NOTE — MISCELLANEOUS
Provider Comments  Provider Comments: The appt was confirmed by automated call        Signatures   Electronically signed by : Kurt Ventura, ; May 31 2017 10:33AM EST                       (Author)

## 2018-01-22 VITALS
DIASTOLIC BLOOD PRESSURE: 80 MMHG | SYSTOLIC BLOOD PRESSURE: 134 MMHG | BODY MASS INDEX: 22.13 KG/M2 | WEIGHT: 146 LBS | HEIGHT: 68 IN

## 2018-01-22 VITALS
BODY MASS INDEX: 23.04 KG/M2 | SYSTOLIC BLOOD PRESSURE: 131 MMHG | DIASTOLIC BLOOD PRESSURE: 82 MMHG | HEIGHT: 68 IN | WEIGHT: 152 LBS

## 2018-01-22 VITALS
HEIGHT: 68 IN | DIASTOLIC BLOOD PRESSURE: 70 MMHG | BODY MASS INDEX: 22.4 KG/M2 | SYSTOLIC BLOOD PRESSURE: 118 MMHG | WEIGHT: 147.8 LBS

## 2018-01-22 VITALS
BODY MASS INDEX: 22.89 KG/M2 | HEIGHT: 68 IN | WEIGHT: 151.03 LBS | DIASTOLIC BLOOD PRESSURE: 65 MMHG | SYSTOLIC BLOOD PRESSURE: 121 MMHG

## 2018-01-22 VITALS
BODY MASS INDEX: 23.34 KG/M2 | SYSTOLIC BLOOD PRESSURE: 110 MMHG | WEIGHT: 154 LBS | HEIGHT: 68 IN | DIASTOLIC BLOOD PRESSURE: 68 MMHG

## 2018-01-23 VITALS
BODY MASS INDEX: 23.64 KG/M2 | HEIGHT: 68 IN | DIASTOLIC BLOOD PRESSURE: 76 MMHG | SYSTOLIC BLOOD PRESSURE: 110 MMHG | WEIGHT: 156 LBS

## 2018-01-23 NOTE — PROGRESS NOTES
Chief Complaint  Patient got her T-DAP Vaccine today on her Left Deltoid LOT# [id number] expires on 04/11/2019 NDC# [id number]      Active Problems    1  Asthma (493 90) (J45 909)   2  Bee sting-induced anaphylaxis (989 5,E905 3) (T63 441A)   3  Chlamydia infection affecting pregnancy in first trimester (647 63,079 98)   (O98 311,A74 9)   4  Chronic pain of left knee (475 18,251 34) (M25 562,G89 29)   5  Encounter for supervision of normal first pregnancy in third trimester (V22 0) (Z34 03)   6  Insufficient prenatal care in second trimester (V23 7) (O09 32)   7  Unplanned pregnancy (V22 2) (Z34 90)    Current Meds   1  EpiPen 2-James 0 3 MG/0 3ML Injection Solution Auto-injector; INJECT 0 3ML   INTRAMUSCULARLY AS DIRECTED; Therapy: 11VSI2505 to (Last Rx:28Nov2016)  Requested for: 28Nov2016 Ordered   2  Fluticasone Propionate 50 MCG/ACT Nasal Suspension; USE 2 SPRAYS IN EACH   NOSTRIL ONCE DAILY; Therapy: 35ADX8943 to (Last Rx:28Nov2016)  Requested for: 28Nov2016 Ordered   3  Prenatal TABS; Therapy: [phone]) to Recorded   4  ProAir RespiClick 707 (90 Base) MCG/ACT Inhalation Aerosol Powder Breath Activated; Inhale 2 puffs every 4-6 hours as needed for cough or wheezing; Therapy: 67RLG8551 to (Last Rx:28Nov2016)  Requested for: 28Nov2016 Ordered    Allergies    1  Penicillins    2  Bee sting   3  No Known Food Allergies    Vitals  Signs    Respiration Quality: Normal  Respiration: 16  Systolic: 945, LUE, Sitting  Diastolic: 76, LUE, Sitting  Height: 5 ft 8 in  Weight: 161 lb 6 4 oz  BMI Calculated: 24 54  BSA Calculated: 1 87  LMP: 24MKF9528    Assessment    1   Encounter for supervision of normal first pregnancy in third trimester (V22 0) (Z34 03)    Plan  Need for Tdap vaccination    · Tdap (Adacel)    Signatures   Electronically signed by : Garima Weems MA; Siddharth 15 2018  1:19PM EST                       (Author)    Electronically signed by : Danica Ambrosio; Siddharth 15 2018  1:25PM EST (Author)    Electronically signed by : JESSY Voss ; Siddharth 15 2018  1:40PM EST                       (Author)

## 2018-01-24 VITALS
DIASTOLIC BLOOD PRESSURE: 76 MMHG | WEIGHT: 161.4 LBS | HEIGHT: 68 IN | BODY MASS INDEX: 24.46 KG/M2 | SYSTOLIC BLOOD PRESSURE: 124 MMHG | RESPIRATION RATE: 16 BRPM

## 2018-01-29 ENCOUNTER — LAB (OUTPATIENT)
Dept: LAB | Facility: MEDICAL CENTER | Age: 23
End: 2018-01-29
Payer: COMMERCIAL

## 2018-01-29 ENCOUNTER — ROUTINE PRENATAL (OUTPATIENT)
Dept: OBGYN CLINIC | Facility: MEDICAL CENTER | Age: 23
End: 2018-01-29

## 2018-01-29 VITALS — WEIGHT: 160 LBS | BODY MASS INDEX: 24.33 KG/M2 | SYSTOLIC BLOOD PRESSURE: 110 MMHG | DIASTOLIC BLOOD PRESSURE: 74 MMHG

## 2018-01-29 DIAGNOSIS — Z77.21 PERSONAL HISTORY OF EXPOSURE TO POTENTIALLY HAZARDOUS BODY FLUIDS: ICD-10-CM

## 2018-01-29 DIAGNOSIS — Z34.90 ENCOUNTER FOR SUPERVISION OF NORMAL PREGNANCY: ICD-10-CM

## 2018-01-29 DIAGNOSIS — N92.1 EXCESSIVE AND FREQUENT MENSTRUATION WITH IRREGULAR CYCLE: ICD-10-CM

## 2018-01-29 DIAGNOSIS — IMO0002 UNSPECIFIED DYSPAREUNIA: ICD-10-CM

## 2018-01-29 DIAGNOSIS — N30.00 ACUTE CYSTITIS WITHOUT HEMATURIA: Primary | ICD-10-CM

## 2018-01-29 DIAGNOSIS — Z34.03 SUPERVISION OF NORMAL FIRST PREGNANCY IN THIRD TRIMESTER: Primary | ICD-10-CM

## 2018-01-29 PROBLEM — N39.0 ACUTE UTI (URINARY TRACT INFECTION): Status: ACTIVE | Noted: 2017-11-19

## 2018-01-29 PROBLEM — N39.0 ACUTE UTI (URINARY TRACT INFECTION): Status: RESOLVED | Noted: 2017-11-19 | Resolved: 2018-01-29

## 2018-01-29 PROBLEM — A74.9 CHLAMYDIA: Status: RESOLVED | Noted: 2017-11-01 | Resolved: 2018-01-29

## 2018-01-29 PROBLEM — R10.13 ABDOMINAL PAIN, EPIGASTRIC: Status: RESOLVED | Noted: 2017-11-30 | Resolved: 2018-01-29

## 2018-01-29 PROBLEM — O36.8190 DECREASED FETAL MOVEMENTS: Status: RESOLVED | Noted: 2018-01-10 | Resolved: 2018-01-29

## 2018-01-29 PROBLEM — Z3A.27 27 WEEKS GESTATION OF PREGNANCY: Status: RESOLVED | Noted: 2018-01-10 | Resolved: 2018-01-29

## 2018-01-29 PROBLEM — A74.9 CHLAMYDIA INFECTION AFFECTING PREGNANCY IN FIRST TRIMESTER: Status: ACTIVE | Noted: 2017-12-14

## 2018-01-29 PROBLEM — M25.562 CHRONIC PAIN OF LEFT KNEE: Status: ACTIVE | Noted: 2017-05-26

## 2018-01-29 PROBLEM — O98.811 CHLAMYDIA INFECTION AFFECTING PREGNANCY IN FIRST TRIMESTER: Status: ACTIVE | Noted: 2017-12-14

## 2018-01-29 PROBLEM — G89.29 CHRONIC PAIN OF LEFT KNEE: Status: ACTIVE | Noted: 2017-05-26

## 2018-01-29 PROBLEM — R10.13 ABDOMINAL PAIN, EPIGASTRIC: Status: ACTIVE | Noted: 2017-11-30

## 2018-01-29 PROBLEM — A74.9 CHLAMYDIA: Status: ACTIVE | Noted: 2017-11-01

## 2018-01-29 LAB
B-HCG SERPL-ACNC: 2606 MIU/ML
BACTERIA UR QL AUTO: ABNORMAL /HPF
BASOPHILS # BLD AUTO: 0.01 THOUSANDS/ΜL (ref 0–0.1)
BASOPHILS NFR BLD AUTO: 0 % (ref 0–1)
BILIRUB UR QL STRIP: NEGATIVE
CLARITY UR: ABNORMAL
COLOR UR: YELLOW
EOSINOPHIL # BLD AUTO: 0.14 THOUSAND/ΜL (ref 0–0.61)
EOSINOPHIL NFR BLD AUTO: 2 % (ref 0–6)
ERYTHROCYTE [DISTWIDTH] IN BLOOD BY AUTOMATED COUNT: 15 % (ref 11.6–15.1)
GLUCOSE UR STRIP-MCNC: NEGATIVE MG/DL
HCT VFR BLD AUTO: 37.3 % (ref 34.8–46.1)
HGB BLD-MCNC: 12.7 G/DL (ref 11.5–15.4)
HGB UR QL STRIP.AUTO: NEGATIVE
HYALINE CASTS #/AREA URNS LPF: ABNORMAL /LPF
KETONES UR STRIP-MCNC: NEGATIVE MG/DL
LEUKOCYTE ESTERASE UR QL STRIP: ABNORMAL
LYMPHOCYTES # BLD AUTO: 1.8 THOUSANDS/ΜL (ref 0.6–4.47)
LYMPHOCYTES NFR BLD AUTO: 24 % (ref 14–44)
MCH RBC QN AUTO: 31.1 PG (ref 26.8–34.3)
MCHC RBC AUTO-ENTMCNC: 34 G/DL (ref 31.4–37.4)
MCV RBC AUTO: 91 FL (ref 82–98)
MONOCYTES # BLD AUTO: 0.63 THOUSAND/ΜL (ref 0.17–1.22)
MONOCYTES NFR BLD AUTO: 8 % (ref 4–12)
NEUTROPHILS # BLD AUTO: 4.93 THOUSANDS/ΜL (ref 1.85–7.62)
NEUTS SEG NFR BLD AUTO: 66 % (ref 43–75)
NITRITE UR QL STRIP: NEGATIVE
NON-SQ EPI CELLS URNS QL MICRO: ABNORMAL /HPF
NRBC BLD AUTO-RTO: 0 /100 WBCS
PH UR STRIP.AUTO: 7 [PH] (ref 4.5–8)
PLATELET # BLD AUTO: 181 THOUSANDS/UL (ref 149–390)
PMV BLD AUTO: 11.6 FL (ref 8.9–12.7)
PROT UR STRIP-MCNC: ABNORMAL MG/DL
RBC # BLD AUTO: 4.08 MILLION/UL (ref 3.81–5.12)
RBC #/AREA URNS AUTO: ABNORMAL /HPF
SP GR UR STRIP.AUTO: 1.03 (ref 1–1.03)
UROBILINOGEN UR QL STRIP.AUTO: 1 E.U./DL
WBC # BLD AUTO: 7.54 THOUSAND/UL (ref 4.31–10.16)
WBC #/AREA URNS AUTO: ABNORMAL /HPF

## 2018-01-29 PROCEDURE — 87086 URINE CULTURE/COLONY COUNT: CPT

## 2018-01-29 PROCEDURE — 87491 CHLMYD TRACH DNA AMP PROBE: CPT | Performed by: NURSE PRACTITIONER

## 2018-01-29 PROCEDURE — 81001 URINALYSIS AUTO W/SCOPE: CPT

## 2018-01-29 PROCEDURE — 86803 HEPATITIS C AB TEST: CPT

## 2018-01-29 PROCEDURE — 80081 OBSTETRIC PANEL INC HIV TSTG: CPT

## 2018-01-29 PROCEDURE — 36415 COLL VENOUS BLD VENIPUNCTURE: CPT

## 2018-01-29 PROCEDURE — PNV: Performed by: NURSE PRACTITIONER

## 2018-01-29 PROCEDURE — 84702 CHORIONIC GONADOTROPIN TEST: CPT

## 2018-01-29 PROCEDURE — 87591 N.GONORRHOEAE DNA AMP PROB: CPT | Performed by: NURSE PRACTITIONER

## 2018-01-29 RX ORDER — EPINEPHRINE 0.3 MG/.3ML
0.3 INJECTION SUBCUTANEOUS
COMMUNITY
Start: 2016-11-28 | End: 2018-04-09 | Stop reason: SDUPTHER

## 2018-01-29 RX ORDER — FLUTICASONE PROPIONATE 50 MCG
2 SPRAY, SUSPENSION (ML) NASAL DAILY
COMMUNITY
Start: 2016-11-28 | End: 2020-10-21 | Stop reason: SDUPTHER

## 2018-01-29 NOTE — PROGRESS NOTES
Doing well  Denies VB/CTX/LOF  1 - Proteinuria: fluid intake has been poor today per patient  Urine visibly concentrated  Advised increased hydration  2 - Recently found out boyfriend was cheating on her since August  Requests repeat STI testing   Cultures for GC/CH obtained today and lab slip given for serum STI testing  3 - Check-in card for hospital provided  4 - Breast pump Rx provided    RTO 2 weeks

## 2018-01-30 ENCOUNTER — TELEPHONE (OUTPATIENT)
Dept: OBGYN CLINIC | Facility: CLINIC | Age: 23
End: 2018-01-30

## 2018-01-30 ENCOUNTER — DOCUMENTATION (OUTPATIENT)
Dept: OBGYN CLINIC | Facility: CLINIC | Age: 23
End: 2018-01-30

## 2018-01-30 ENCOUNTER — LAB REQUISITION (OUTPATIENT)
Dept: LAB | Facility: HOSPITAL | Age: 23
End: 2018-01-30
Payer: COMMERCIAL

## 2018-01-30 DIAGNOSIS — Z34.90 ENCOUNTER FOR SUPERVISION OF NORMAL PREGNANCY: ICD-10-CM

## 2018-01-30 DIAGNOSIS — N30.01 ACUTE CYSTITIS WITH HEMATURIA: Primary | ICD-10-CM

## 2018-01-30 DIAGNOSIS — N30.00 ACUTE CYSTITIS WITHOUT HEMATURIA: ICD-10-CM

## 2018-01-30 LAB
ABO GROUP BLD: NORMAL
BACTERIA UR CULT: NORMAL
BLD GP AB SCN SERPL QL: NEGATIVE
HBV SURFACE AG SER QL: NORMAL
HCV AB SER QL: NORMAL
RH BLD: POSITIVE
RPR SER QL: NORMAL
RUBV IGG SERPL IA-ACNC: 98.1 IU/ML
SPECIMEN EXPIRATION DATE: NORMAL

## 2018-01-30 RX ORDER — NITROFURANTOIN 25; 75 MG/1; MG/1
100 CAPSULE ORAL 2 TIMES DAILY
Qty: 14 CAPSULE | Refills: 0 | Status: CANCELLED | OUTPATIENT
Start: 2018-01-30 | End: 2018-02-06

## 2018-01-30 RX ORDER — NITROFURANTOIN 25; 75 MG/1; MG/1
100 CAPSULE ORAL 2 TIMES DAILY
Qty: 14 CAPSULE | Refills: 0 | Status: SHIPPED | OUTPATIENT
Start: 2018-01-30 | End: 2018-02-06

## 2018-01-30 NOTE — PROGRESS NOTES
This was a lab error - quantitative beta hCG was never ordered for patient at yesterday's encounter

## 2018-01-30 NOTE — TELEPHONE ENCOUNTER
Spoke with Pt today via phone call  Pt informed that her recent UA was positive for UTI  Pt further informed that a prescription for Macrobid 100 mg PO BID for 7 days 0 refills was electronically forwarded to her pharmacy in EHR pending Dr Naldo Rendon  Pt informed that ROSITA UA lab order within 2 weeks (2/13/18) completed in [facility]  Pt states she uses [facility] lab facilities  Pt instructed to finish prescribed medication completely and to push fluids  Pt verbalized understanding of said instructions

## 2018-01-31 LAB
CHLAMYDIA DNA CVX QL NAA+PROBE: NORMAL
HIV 1+2 AB+HIV1 P24 AG SERPL QL IA: NORMAL
N GONORRHOEA DNA GENITAL QL NAA+PROBE: NORMAL

## 2018-02-26 ENCOUNTER — ROUTINE PRENATAL (OUTPATIENT)
Dept: OBGYN CLINIC | Facility: MEDICAL CENTER | Age: 23
End: 2018-02-26

## 2018-02-26 VITALS — BODY MASS INDEX: 25.39 KG/M2 | SYSTOLIC BLOOD PRESSURE: 120 MMHG | WEIGHT: 167 LBS | DIASTOLIC BLOOD PRESSURE: 80 MMHG

## 2018-02-26 DIAGNOSIS — Z34.03 ENCOUNTER FOR SUPERVISION OF NORMAL FIRST PREGNANCY IN THIRD TRIMESTER: Primary | ICD-10-CM

## 2018-02-26 PROCEDURE — PNV: Performed by: NURSE PRACTITIONER

## 2018-02-26 NOTE — PROGRESS NOTES
Tremayne James is doing well  Denies LOF/VB/CTX  We have not seen her since 1/29, she says this is due to scheduling conflicts on our part  Seems much happier at today's visit, STI testing was normal, FOB will be in delivery room but they are no longer together  Will schedule hospital check-in  Car seat has been installed  Recommend choosing pediatrician  Anticipatory guidance for upcoming visits, including GBS culture, provided  RTO 2 weeks      Nino Cash

## 2018-02-26 NOTE — LETTER
February 26, 2018     Patient: Geovanna Levy   YOB: 1995   Date of Visit: 2/26/2018       To Whom it May Concern:    Geovanna Levy is under my professional care  She was seen in my office on 2/26/2018  She should be on light duty at her place of employment due to third trimester of pregnancy  If you have any questions or concerns, please don't hesitate to call           Sincerely,          JONAS Ibrahim        CC: No Recipients

## 2018-02-26 NOTE — MISCELLANEOUS
Message   Recorded as Task   Date: 01/10/2018 11:50 AM, Created By: [facility], [facility]   Task Name: Medical Complaint Callback   Assigned To: Chelsea Hastings   Regarding Patient: Anu Lara, Status: In Progress   Comment:    Yumi John - 10 Siddharth 2018 11:50 AM     TASK CREATED  Caller: Self; Medical Complaint; [phone] (Home)  27 wks - no baby movement since yesterday  Best reached [phone]   Josie Daugherty - 10 Siddharth 2018 11:51 AM     TASK IN PROGRESS   Josie Daugherty - 10 Siddharth 2018 12:31 PM     TASK EDITED  Spoke with Pt today via phone call  Pt reports that she has not felt any fetal movement since 7:00 pm last night  Pt does not report any adverse symptoms (leakage of fluid, bleeding, cramping)  Pt's LICO is 4/9/18, GA 27-2  Pt informed that her symptoms were addressed with Dr Estrella Wright (AM physician on call)  Pt instructed to report directly to triage unit (L&D) per Dr Whyte Breath directive  Pt states she is going to report to [place], as it is the nearest ER  Pt further states her mother will drive her to ER  **Task reassigned to re-activate in near future as a follow-up with regards to Pt's ER visit  Yumi Ackerman - 11 Jan 2018 9:09 AM     TASK EDITED  Left voicemail message today @ [phone] for Pt to call back office  Pt went to [place] [address] ER at [facility] on 1/10/18 for decreased fetal movement concerns  While Pt was at said ER, good fetal movement was noted (fetal heart rate 140-150 BPM/positive fetal motion) per ER note dated 1/10/18 in EPIC  Phone call made to Pt as a follow-up to ER visit on 1/10/18  Josie Daugherty - 11 Jan 2018 9:10 AM     TASK IN PROGRESS   Josie Daugherty - 11 Jan 2018 10:31 AM     TASK EDITED  Spoke with Pt today via phone call  Pt states she is feeling much better since her ER visit yesterday  Pt further states she has been having positive fetal movement since yesterday    Pt does not report any adverse symptoms (fluid leakage/bleeding/cramping)  Reiterated to Pt the importance of attending scheduled prenatal appointments  Pt informed that if she has any questions/concerns, to contact office  Active Problems    1  Abdominal pain, epigastric (789 06) (R10 13)   2  Acute UTI (urinary tract infection) (599 0) (N39 0)   3  Asthma (493 90) (J45 909)   4  Bee sting-induced anaphylaxis (989 5,E905 3) (T63 441A)   5  Chlamydia infection affecting pregnancy in first trimester (647 63,079 98)   (O98 311,A74 9)   6  Chronic pain of left knee (163 10,782 57) (M25 562,G89 29)   7  Encounter for supervision of normal pregnancy in second trimester (V22 1) (Z34 92)   8  Insufficient prenatal care in second trimester (V23 7) (O09 32)   9  Unplanned pregnancy (V22 2) (Z34 90)    Current Meds   1  EpiPen 2-James 0 3 MG/0 3ML Injection Solution Auto-injector; INJECT 0 3ML   INTRAMUSCULARLY AS DIRECTED; Therapy: 80XYI7902 to (Last Rx:28Nov2016)  Requested for: 28Nov2016 Ordered   2  Fluticasone Propionate 50 MCG/ACT Nasal Suspension; USE 2 SPRAYS IN EACH   NOSTRIL ONCE DAILY; Therapy: 00FAW9519 to (Last Rx:28Nov2016)  Requested for: 28Nov2016 Ordered   3  Prenatal TABS; Therapy: (0623450882) to Recorded   4  ProAir RespiClick 405 (90 Base) MCG/ACT Inhalation Aerosol Powder Breath Activated; Inhale 2 puffs every 4-6 hours as needed for cough or wheezing; Therapy: 10GLQ8216 to (Last Rx:28Nov2016)  Requested for: 28Nov2016 Ordered    Allergies    1  Penicillins    2  Bee sting   3   No Known Food Allergies    Signatures   Electronically signed by : Talia Sosa MA; Jan 11 2018 10:32AM EST                       (Author)

## 2018-02-26 NOTE — PROCEDURES
Procedures by Dominique Cottrell MD at 1/10/2018   4:23 PM      Author:  Dominique Cottrell MD Service:  OB/GYN Author Type:  Resident    Filed:  1/10/2018  4:23 PM Date of Service:  1/10/2018  4:23 PM Status:  Attested    :  Dominique Cottrell MD (Resident)  Cosigner:  Rashaad oLpez MD at 2018  8:43 AM    Attestation signed by Rashaad Lopez MD at 2018  8:43 AM      I have reviewed the notes, assessments, and/or procedures performed by Dr Nathaniel Gautam, I concur with her/his documentation of [facility]  I have examined the patient  myself  [facility], a  at 27w2d with an LICO of 2018, by Ultrasound, was seen at [address] [facility] for the following procedure(s):  $Procedure Type: TU, NST]    Nonstress Test  Reason for NST: Decreased Fetal Movement  Variability: Moderate  Decelerations: None  Accelerations: Yes  Acoustic Stimulator: No  Baseline: 125 BPM  Uterine Irritability: No  Contractions: Not present  Contraction Frequency (minutes): 0 min    4 Quadrant TU  TU Q1 (cm): 4 4 cm  TU Q2 (cm): 6 4 cm  TU Q3 (cm): 4 cm  TU Q4 (cm): 2 1 cm  TU TOTAL (cm): 16 9 cm  LVP (cm): 6 4 cm              Interpretation  Nonstress Test Interpretation: Reactive  Overall Impression: Ivone MORRIS    2018  8:44AM [facility] Standard Time

## 2018-03-13 ENCOUNTER — ROUTINE PRENATAL (OUTPATIENT)
Dept: OBGYN CLINIC | Facility: MEDICAL CENTER | Age: 23
End: 2018-03-13

## 2018-03-13 VITALS — BODY MASS INDEX: 25.51 KG/M2 | DIASTOLIC BLOOD PRESSURE: 58 MMHG | WEIGHT: 167.8 LBS | SYSTOLIC BLOOD PRESSURE: 108 MMHG

## 2018-03-13 DIAGNOSIS — Z34.03 ENCOUNTER FOR SUPERVISION OF NORMAL FIRST PREGNANCY IN THIRD TRIMESTER: Primary | ICD-10-CM

## 2018-03-13 DIAGNOSIS — Z34.93 THIRD TRIMESTER PREGNANCY: ICD-10-CM

## 2018-03-13 PROCEDURE — 87653 STREP B DNA AMP PROBE: CPT | Performed by: OBSTETRICS & GYNECOLOGY

## 2018-03-13 PROCEDURE — PNV: Performed by: OBSTETRICS & GYNECOLOGY

## 2018-03-15 LAB — GP B STREP DNA SPEC QL NAA+PROBE: NORMAL

## 2018-03-19 ENCOUNTER — ROUTINE PRENATAL (OUTPATIENT)
Dept: OBGYN CLINIC | Facility: MEDICAL CENTER | Age: 23
End: 2018-03-19

## 2018-03-19 ENCOUNTER — TRANSCRIBE ORDERS (OUTPATIENT)
Dept: OBGYN CLINIC | Facility: MEDICAL CENTER | Age: 23
End: 2018-03-19

## 2018-03-19 VITALS — WEIGHT: 168 LBS | SYSTOLIC BLOOD PRESSURE: 120 MMHG | BODY MASS INDEX: 25.54 KG/M2 | DIASTOLIC BLOOD PRESSURE: 62 MMHG

## 2018-03-19 DIAGNOSIS — Z34.03 FIRST PREGNANCY, THIRD TRIMESTER: Primary | ICD-10-CM

## 2018-03-19 PROCEDURE — PNV: Performed by: PHYSICIAN ASSISTANT

## 2018-03-19 NOTE — PROGRESS NOTES
Problem List Items Addressed This Visit     RESOLVED: First pregnancy, third trimester - Primary     Feels well  GFM   Its a boy - Damon  GBS neg  Labor precautions reviewed

## 2018-03-26 ENCOUNTER — ROUTINE PRENATAL (OUTPATIENT)
Dept: OBGYN CLINIC | Facility: MEDICAL CENTER | Age: 23
End: 2018-03-26

## 2018-03-26 VITALS — DIASTOLIC BLOOD PRESSURE: 72 MMHG | WEIGHT: 171 LBS | BODY MASS INDEX: 26 KG/M2 | SYSTOLIC BLOOD PRESSURE: 110 MMHG

## 2018-03-26 DIAGNOSIS — Z34.03 ENCOUNTER FOR SUPERVISION OF NORMAL FIRST PREGNANCY IN THIRD TRIMESTER: Primary | ICD-10-CM

## 2018-03-26 DIAGNOSIS — Z3A.38 38 WEEKS GESTATION OF PREGNANCY: ICD-10-CM

## 2018-03-26 PROCEDURE — PNV: Performed by: NURSE PRACTITIONER

## 2018-03-26 NOTE — PROGRESS NOTES
Doing well  Denies LOF, VB, CTX  They [place] about [place] from [place]  Labor precautions reviewed  RTO 1 week      Real Ryan

## 2018-04-02 ENCOUNTER — ROUTINE PRENATAL (OUTPATIENT)
Dept: OBGYN CLINIC | Facility: MEDICAL CENTER | Age: 23
End: 2018-04-02

## 2018-04-02 VITALS — WEIGHT: 172 LBS | DIASTOLIC BLOOD PRESSURE: 62 MMHG | BODY MASS INDEX: 26.15 KG/M2 | SYSTOLIC BLOOD PRESSURE: 114 MMHG

## 2018-04-02 DIAGNOSIS — Z34.03 ENCOUNTER FOR SUPERVISION OF NORMAL FIRST PREGNANCY IN THIRD TRIMESTER: Primary | ICD-10-CM

## 2018-04-02 PROCEDURE — PNV: Performed by: PHYSICIAN ASSISTANT

## 2018-04-02 NOTE — ASSESSMENT & PLAN NOTE
RTO 1 week  Discussed possibility of IOL at 41 weeks, can be scheduled if cervix favorable at next weeks appointment  Reviewed labor precautions, fetal kick counts and reasons to call

## 2018-04-02 NOTE — PROGRESS NOTES
Patient w/o complaints  (+) good fetal movement, denies any bleeding, fluid leakage or ctx  Cervical exam unchanged  Problem List Items Addressed This Visit     Encounter for supervision of normal first pregnancy in third trimester - Primary     RTO 1 week  Discussed possibility of IOL at 41 weeks, can be scheduled if cervix favorable at next weeks appointment  Reviewed labor precautions, fetal kick counts and reasons to call

## 2018-04-09 ENCOUNTER — ROUTINE PRENATAL (OUTPATIENT)
Dept: OBGYN CLINIC | Facility: CLINIC | Age: 23
End: 2018-04-09

## 2018-04-09 VITALS — SYSTOLIC BLOOD PRESSURE: 110 MMHG | DIASTOLIC BLOOD PRESSURE: 66 MMHG | BODY MASS INDEX: 26.37 KG/M2 | WEIGHT: 173.4 LBS

## 2018-04-09 DIAGNOSIS — Z34.93 ENCOUNTER FOR SUPERVISION OF LOW-RISK PREGNANCY IN THIRD TRIMESTER: Primary | ICD-10-CM

## 2018-04-09 PROCEDURE — PNV: Performed by: NURSE PRACTITIONER

## 2018-04-09 NOTE — PROGRESS NOTES
Problem List Items Addressed This Visit     Encounter for pregnancy related examination in third trimester - Primary     Denies OB complaints  Good fetal movement  Denies contractions, cramping, leakage of fluid or vaginal bleeding  GBS neg  The patient desires membrane sweep - reviewed risks/benefits, consent obtained  Advised of limitations presented by dilation of 1cm  This was performed and pt tolerated well  Advised to anticipate spotting; reviewed reasons to call  The patient desires IOL and will RTO later this week for recheck  Discussed plans for postdates testing if still pregnant at 41 weeks  S/p flu and tdap vaccines  Baby and Me considerations reinforced  Reviewed labor precautions and FKCs

## 2018-04-09 NOTE — ASSESSMENT & PLAN NOTE
Denies OB complaints  Good fetal movement  Denies contractions, cramping, leakage of fluid or vaginal bleeding  GBS neg  The patient desires membrane sweep - reviewed risks/benefits, consent obtained  Advised of limitations presented by dilation of 1cm  This was performed and pt tolerated well  Advised to anticipate spotting; reviewed reasons to call  The patient desires IOL and will RTO later this week for recheck  Discussed plans for postdates testing if still pregnant at 41 weeks  S/p flu and tdap vaccines  Baby and Me considerations reinforced  Reviewed labor precautions and FKCs

## 2018-04-26 ENCOUNTER — TELEPHONE (OUTPATIENT)
Dept: FAMILY MEDICINE CLINIC | Facility: MEDICAL CENTER | Age: 23
End: 2018-04-26

## 2018-04-26 DIAGNOSIS — L70.8 OTHER ACNE: Primary | ICD-10-CM

## 2018-04-26 RX ORDER — CLINDAMYCIN PHOSPHATE 10 MG/G
GEL TOPICAL 2 TIMES DAILY
Qty: 30 G | Refills: 3 | Status: SHIPPED | OUTPATIENT
Start: 2018-04-26 | End: 2018-11-07

## 2018-04-26 NOTE — TELEPHONE ENCOUNTER
Left a    Yee Spruce Yee Spruce Patient asking for her acne clindamycin gel   Not in EPIC but in allscripts as discontinued   To [facility] in [place]

## 2018-04-27 ENCOUNTER — TELEPHONE (OUTPATIENT)
Dept: OBGYN CLINIC | Facility: CLINIC | Age: 23
End: 2018-04-27

## 2018-05-22 ENCOUNTER — TELEPHONE (OUTPATIENT)
Dept: OBGYN CLINIC | Facility: CLINIC | Age: 23
End: 2018-05-22

## 2018-05-25 ENCOUNTER — TELEPHONE (OUTPATIENT)
Dept: OBGYN CLINIC | Facility: CLINIC | Age: 23
End: 2018-05-25

## 2018-05-25 ENCOUNTER — POSTPARTUM VISIT (OUTPATIENT)
Dept: OBGYN CLINIC | Facility: CLINIC | Age: 23
End: 2018-05-25

## 2018-05-25 VITALS
BODY MASS INDEX: 24.01 KG/M2 | HEIGHT: 67 IN | WEIGHT: 153 LBS | DIASTOLIC BLOOD PRESSURE: 72 MMHG | SYSTOLIC BLOOD PRESSURE: 120 MMHG

## 2018-05-25 DIAGNOSIS — Z30.09 ENCOUNTER FOR OTHER GENERAL COUNSELING AND ADVICE ON CONTRACEPTION: ICD-10-CM

## 2018-05-25 PROBLEM — O09.30 INSUFFICIENT PRENATAL CARE: Status: RESOLVED | Noted: 2018-01-10 | Resolved: 2018-05-25

## 2018-05-25 PROBLEM — M25.562 CHRONIC PAIN OF LEFT KNEE: Status: RESOLVED | Noted: 2017-05-26 | Resolved: 2018-05-25

## 2018-05-25 PROBLEM — Z34.93 ENCOUNTER FOR PREGNANCY RELATED EXAMINATION IN THIRD TRIMESTER: Status: RESOLVED | Noted: 2018-04-09 | Resolved: 2018-05-25

## 2018-05-25 PROBLEM — G89.29 CHRONIC PAIN OF LEFT KNEE: Status: RESOLVED | Noted: 2017-05-26 | Resolved: 2018-05-25

## 2018-05-25 PROBLEM — Z34.03 ENCOUNTER FOR SUPERVISION OF NORMAL FIRST PREGNANCY IN THIRD TRIMESTER: Status: RESOLVED | Noted: 2018-03-26 | Resolved: 2018-05-25

## 2018-05-25 PROBLEM — A74.9 CHLAMYDIA INFECTION AFFECTING PREGNANCY IN FIRST TRIMESTER: Status: RESOLVED | Noted: 2017-12-14 | Resolved: 2018-05-25

## 2018-05-25 PROBLEM — Z86.19 HISTORY OF CHLAMYDIA: Status: ACTIVE | Noted: 2018-05-25

## 2018-05-25 PROBLEM — O98.811 CHLAMYDIA INFECTION AFFECTING PREGNANCY IN FIRST TRIMESTER: Status: RESOLVED | Noted: 2017-12-14 | Resolved: 2018-05-25

## 2018-05-25 PROCEDURE — 99024 POSTOP FOLLOW-UP VISIT: CPT | Performed by: NURSE PRACTITIONER

## 2018-05-25 RX ORDER — SERTRALINE HYDROCHLORIDE 25 MG/1
25 TABLET, FILM COATED ORAL DAILY
Qty: 30 TABLET | Refills: 0 | Status: SHIPPED | OUTPATIENT
Start: 2018-05-25 | End: 2018-09-21 | Stop reason: SDUPTHER

## 2018-05-25 NOTE — ASSESSMENT & PLAN NOTE
The patient expresses interest in hormone free contra  Reviewed Paragard risks/benefits, SE's/AE's  Written info provided  She will RTO in 1-2 weeks for insertion  She plans to remain abstinent until that time

## 2018-05-25 NOTE — TELEPHONE ENCOUNTER
Vandana Neda can you please label a Paragard for this patient  It is covered at 100% with no copay no deduct   Ref # [id number]

## 2018-05-25 NOTE — PROGRESS NOTES
Assessment/Plan:    Postpartum depression  The patient reports feeling depressed since delivery  EPDS is 7  She denies SI/HI and reports no issues with bonding  She requests medication for management  Discussed low dose of Zoloft  Reviewed risks/benefits, SE's/AE's and directions for use  She will start with 1/2 tab (12 5mg) daily for several days then increase to one tab daily  Advised stopping immed for SI/HI or bizarre thoughts  We discussed benefits of counseling, therapy and support groups as well - she declines referral for this at this time  She will RTO in 1-2 weeks for f/u or sooner PRN  Encounter for other general counseling and advice on contraception  The patient expresses interest in hormone free contra  Reviewed Paragard risks/benefits, SE's/AE's  Written info provided  She will RTO in 1-2 weeks for insertion  She plans to remain abstinent until that time  Postpartum care following vaginal delivery  Normal postpartum exam  Pt c/o PP depression (see separate heading); she denies complaints otherwise  Baby is thriving  She will RTO for paragard insertion in 1-2 weeks  F/u for routine annual in 6 mos  Diagnoses and all orders for this visit:    Postpartum care following vaginal delivery    Postpartum depression    Encounter for other general counseling and advice on contraception          Subjective:      Patient ID: Adi Shoemaker is a 23 y o  female  This patient presents for routine postpartum visit  She is s/p  and repair of periurethral lac on 18 at [facility]  She reports going to [facility] because she called with c/o SROM for green fluid and she was instructed by on call doc to report to nearest hospital  Records are not present - hx is per pt  Prenatal course complicated by chlamydia; intrapartum course complicated by thick med, otherwise normal  She was d/c'd home on PPD2  Since d/c home she has had no complaints   She denies abn bleeding, pelvic pain, breast complaints, bowel/bladder dysfunction, depression/anx  Baby is thriving  Breast feeding  C/o feeling very sad  Denies SI/HI  EPDS score 7  Good support from mother but not from FOB  Interested in nonhormonal option for contraception  The following portions of the patient's history were reviewed and updated as appropriate: allergies, current medications, past family history, past medical history, past social history, past surgical history and problem list     Review of Systems   Constitutional: Negative  HENT: Negative  Eyes: Negative  Respiratory: Negative  Cardiovascular: Negative  Gastrointestinal: Negative  Endocrine: Negative  Genitourinary: Negative  Musculoskeletal: Negative  Skin: Negative  Allergic/Immunologic: Negative  Neurological: Negative  Hematological: Negative  Psychiatric/Behavioral: Negative  Objective:      /72 (BP Location: Right arm)   Ht 5' 7" (1 702 m)   Wt 69 4 kg (153 lb)   LMP 05/26/2017 (Exact Date)   Breastfeeding? Yes   BMI 23 96 kg/m²          Physical Exam   Constitutional: She is oriented to person, place, and time  She appears well-developed and well-nourished  HENT:   Head: Normocephalic and atraumatic  Eyes: Pupils are equal, round, and reactive to light  Neck: Normal range of motion  Pulmonary/Chest: Effort normal    Abdominal: Hernia confirmed negative in the right inguinal area and confirmed negative in the left inguinal area  Genitourinary: Rectum normal and uterus normal  There is no rash, tenderness, lesion or injury on the right labia  There is no rash, tenderness, lesion or injury on the left labia  Cervix exhibits no motion tenderness, no discharge and no friability  Right adnexum displays no mass, no tenderness and no fullness  Left adnexum displays no mass, no tenderness and no fullness  No erythema, tenderness or bleeding in the vagina  No vaginal discharge found         Musculoskeletal: Normal range of motion  Lymphadenopathy:        Right: No inguinal adenopathy present  Left: No inguinal adenopathy present  Neurological: She is alert and oriented to person, place, and time  Skin: Skin is warm and dry     Psychiatric: Her behavior is normal  Judgment and thought content normal    Flat affect   Tearful with discussion of depressed moods

## 2018-05-25 NOTE — ASSESSMENT & PLAN NOTE
The patient reports feeling depressed since delivery  EPDS is 7  She denies SI/HI and reports no issues with bonding  She requests medication for management  Discussed low dose of Zoloft  Reviewed risks/benefits, SE's/AE's and directions for use  She will start with 1/2 tab (12 5mg) daily for several days then increase to one tab daily  Advised stopping immed for SI/HI or bizarre thoughts  We discussed benefits of counseling, therapy and support groups as well - she declines referral for this at this time  She will RTO in 1-2 weeks for f/u or sooner PRN

## 2018-05-25 NOTE — ASSESSMENT & PLAN NOTE
Normal postpartum exam  Pt c/o PP depression (see separate heading); she denies complaints otherwise  Baby is thriving  She will RTO for paragard insertion in 1-2 weeks  F/u for routine annual in 6 mos

## 2018-06-06 ENCOUNTER — TELEPHONE (OUTPATIENT)
Dept: OBGYN CLINIC | Facility: CLINIC | Age: 23
End: 2018-06-06

## 2018-06-06 ENCOUNTER — TELEPHONE (OUTPATIENT)
Dept: PSYCHIATRY | Facility: CLINIC | Age: 23
End: 2018-06-06

## 2018-06-06 ENCOUNTER — OFFICE VISIT (OUTPATIENT)
Dept: OBGYN CLINIC | Facility: MEDICAL CENTER | Age: 23
End: 2018-06-06
Payer: COMMERCIAL

## 2018-06-06 VITALS
SYSTOLIC BLOOD PRESSURE: 108 MMHG | WEIGHT: 153.6 LBS | DIASTOLIC BLOOD PRESSURE: 62 MMHG | HEIGHT: 67 IN | BODY MASS INDEX: 24.11 KG/M2

## 2018-06-06 DIAGNOSIS — Z30.430 ENCOUNTER FOR IUD INSERTION: Primary | ICD-10-CM

## 2018-06-06 LAB — SL AMB POCT URINE HCG: NEGATIVE

## 2018-06-06 PROCEDURE — 81025 URINE PREGNANCY TEST: CPT | Performed by: NURSE PRACTITIONER

## 2018-06-06 PROCEDURE — 99213 OFFICE O/P EST LOW 20 MIN: CPT | Performed by: NURSE PRACTITIONER

## 2018-06-06 PROCEDURE — 87491 CHLMYD TRACH DNA AMP PROBE: CPT | Performed by: NURSE PRACTITIONER

## 2018-06-06 PROCEDURE — 87591 N.GONORRHOEAE DNA AMP PROB: CPT | Performed by: NURSE PRACTITIONER

## 2018-06-06 PROCEDURE — 58300 INSERT INTRAUTERINE DEVICE: CPT | Performed by: NURSE PRACTITIONER

## 2018-06-06 RX ORDER — COPPER 313.4 MG/1
1 INTRAUTERINE DEVICE INTRAUTERINE ONCE
Status: COMPLETED | OUTPATIENT
Start: 2018-06-06 | End: 2018-06-06

## 2018-06-06 RX ORDER — SERTRALINE HYDROCHLORIDE 25 MG/1
37.5 TABLET, FILM COATED ORAL DAILY
Qty: 60 TABLET | Refills: 1 | Status: SHIPPED | OUTPATIENT
Start: 2018-06-06 | End: 2018-11-07

## 2018-06-06 RX ADMIN — COPPER 1 INTRA UTERINE DEVICE: 313.4 INTRAUTERINE DEVICE INTRAUTERINE at 13:40

## 2018-06-06 NOTE — ASSESSMENT & PLAN NOTE
Sx of PP depression improved somewhat since starting zoloft 25mg/day  She is interested in trial of slightly higher dose  Advised 37 5mg/day  Reviewed Zoloft risks/benefits, SE's/AE's and directions for use  Discussed risks/benefits in the context of lactation - advised benefits likely outweigh risks  The patient also expresses interest in talking with a counselor - will forward request for referral to [facility] nurses to facilitate  We also discussed opportunity to attend support group at [facility] and [facility] - she will consider  Contact info provided

## 2018-06-06 NOTE — TELEPHONE ENCOUNTER
Behavorial Health Outpatient Intake Questions    Referred by: Herb KINSEY    Check with provider before scheduling    Are there any developmental disabilities? No    Does the patient have hearing impairment? No    Does the patient have ICM or CTT? No    Taking injectable psychiatric medications? NoIf yes, patient can not be seen here  Has the patient ever seen or currently see a psychiatrist? No If yes who/when? Has the patient ever seen or currently see a therapist? No If yes who/when? How many visits did the pt have for previous psychiatric treatment?  History    Has the patient served in the [place] [address]? No    If yes, have you had combat services? No    Was the patient activated into federal active duty as a member of the national guard or reserve? No    Minor Child    Who has custody of the child? Is there a custody agreement? If there is a custody agreement remind parent that they must bring a copy to the first appt or they will not be seen  [facility]     Presenting Problem (in patient's words) PPD    Substance Abuse:No concerns of substance abuse are reported  Has the patient been seen here previously, either inpatient or outpatient? No outpatient    If seen as outpatient, what provider(s) did the patient see? A member of the patient's family has been in therapy here with     ACCEPTED as a patient Yes Appointment Date:     Referred Elsewhere? No    Primary Care Physician: DO VIET Juarez telephone number: [phone] [address] [facility]  ----------------------------------------------------------------------------------------------------------------------    Insurance subscriber:    MAGDALENA CHRISTY;     Address:                                                        Phone:                                   SSN:    Employer: [employer]                                               Address:  ----------------------------------------------------------------------------------------------------------------------    Primary Insurance:   KYLE                                                                Phone: [phone]    ID number:      [id number]                                             Group number: [id number]  ----------------------------------------------------------------------------------------------------------------------    Secondary Insurance:                                                               Phone:    ID number:                                                   Group number:  ----------------------------------------------------------------------------------------------------------------------    Other insurance information:             _______________________________________

## 2018-06-06 NOTE — PROGRESS NOTES
Assessment/Plan:    Encounter for IUD insertion  Paragard IUD inserted without difficulty  Pt tolerated well  She was given ibuprofen 600mg PO postprocedure  Reviewed reasons to call and sx to report incl excessive bleeding, pain unrelieved by ibuprofen or symptoms of infection such as fever, chills or foul smelling discharge  Recommended returning to the office in 6 weeks for IUD string check and utilizing condoms as back up until that time  The patient agrees to the plan  Postpartum depression  Sx of PP depression improved somewhat since starting zoloft 25mg/day  She is interested in trial of slightly higher dose  Advised 37 5mg/day  Reviewed Zoloft risks/benefits, SE's/AE's and directions for use  Discussed risks/benefits in the context of lactation - advised benefits likely outweigh risks  The patient also expresses interest in talking with a counselor - will forward request for referral to [facility] nurses to facilitate  We also discussed opportunity to attend support group at [facility] - she will consider  Contact info provided  Diagnoses and all orders for this visit:    Encounter for IUD insertion  -     Chlamydia/GC amplified DNA by PCR  -     POCT urine HCG  -     PARAGARD INTRAUTERINE COPPER IUD 1 Intra Uterine Device; 1 Intra Uterine Device by Intrauterine route once   -     Iud insertions    Postpartum depression  -     sertraline (ZOLOFT) 25 mg tablet; Take 1 5 tablets (37 5 mg total) by mouth daily          Subjective:      Patient ID: Charlene He is a 23 y o  female  This patient presents for Paragard insertion and follow up on PP depression  Sx have improved somewhat since starting Zoloft however she still has mild sx at times  Denies SI/HI  She is also interested in talking with therapist  She requests referral   Good family support  Baby is well            The following portions of the patient's history were reviewed and updated as appropriate: allergies, current medications, past family history, past medical history, past social history, past surgical history and problem list     Review of Systems   Constitutional: Negative  HENT: Negative  Eyes: Negative  Respiratory: Negative  Cardiovascular: Negative  Gastrointestinal: Negative  Endocrine: Negative  Genitourinary: Negative  Musculoskeletal: Negative  Skin: Negative  Allergic/Immunologic: Negative  Neurological: Negative  Hematological: Negative  Psychiatric/Behavioral: Positive for dysphoric mood  Negative for self-injury and suicidal ideas  Objective:      /62 (BP Location: Left arm)   Ht 5' 7" (1 702 m)   Wt 69 7 kg (153 lb 9 6 oz)   LMP 05/26/2017 (Exact Date)   Breastfeeding? Yes   BMI 24 06 kg/m²          Physical Exam   Constitutional: She is oriented to person, place, and time  She appears well-developed and well-nourished  HENT:   Head: Normocephalic and atraumatic  Eyes: Pupils are equal, round, and reactive to light  Neck: Normal range of motion  Pulmonary/Chest: Effort normal    Abdominal: Hernia confirmed negative in the right inguinal area and confirmed negative in the left inguinal area  Genitourinary: Rectum normal and uterus normal  There is no rash, tenderness, lesion or injury on the right labia  There is no rash, tenderness, lesion or injury on the left labia  Cervix exhibits no motion tenderness, no discharge and no friability  Right adnexum displays no mass, no tenderness and no fullness  Left adnexum displays no mass, no tenderness and no fullness  No erythema, tenderness or bleeding in the vagina  No vaginal discharge found  Musculoskeletal: Normal range of motion  Lymphadenopathy:        Right: No inguinal adenopathy present  Left: No inguinal adenopathy present  Neurological: She is alert and oriented to person, place, and time  Skin: Skin is warm and dry  Psychiatric: She has a normal mood and affect   Her behavior is normal  Judgment and thought content normal

## 2018-06-06 NOTE — TELEPHONE ENCOUNTER
Josette Pemberton called today to request that a "referral" psychiatric counseling be made for Pt  Pt diagnosed with Post Partum Depression  Please call Pt to inform her that referral has been completed  Pt can be reached @ [phone]

## 2018-06-06 NOTE — ASSESSMENT & PLAN NOTE
Paragard IUD inserted without difficulty  Pt tolerated well  She was given ibuprofen 600mg PO postprocedure  Reviewed reasons to call and sx to report incl excessive bleeding, pain unrelieved by ibuprofen or symptoms of infection such as fever, chills or foul smelling discharge  Recommended returning to the office in 6 weeks for IUD string check and utilizing condoms as back up until that time  The patient agrees to the plan

## 2018-06-06 NOTE — TELEPHONE ENCOUNTER
Patient is aware referral was entered for her in her chart and that someone will be contacting her in a few days to make an appointment

## 2018-06-06 NOTE — PROGRESS NOTES
Iud insertions  Date/Time: 6/6/2018 1:22 PM  Performed by: Gail Mauricio  Authorized by: Gail Mauricio     Consent:     Consent obtained:  Verbal and written (consent also obtained by Mary Mazariegos )    Consent given by:  Patient    Procedure risks and benefits discussed: yes      Patient questions answered: yes      Patient agrees, verbalizes understanding, and wants to proceed: yes      Educational handouts given: yes      Instructions and paperwork completed: yes    Procedure:     Pelvic exam performed: yes      Negative GC/chlamydia test: gc/chl sent today  Negative urine pregnancy test: yes      Cervix cleaned and prepped: yes      Speculum placed in vagina: yes      Uterus sounded: yes      IUD inserted with no complications: yes      IUD type:  ParaGard    Strings trimmed: yes      Uterus sound depth (cm):  7 5  Post-procedure:     Patient tolerated procedure well: yes      Patient will follow up after next period: 6 week string check     Comments: This patient presents for Paragard IUD insertion  The risks/benefits, SE's/AE's were reviewed and all questions were answered  Written and verbal consent were obtained by myself and Mary Mazariegos  Time out was performed and allergies were confirmed  The patient was positioned in lithotomy position and spec was inserted  Cervix was visualized and cleansed with betadine  Uterus sounded to 7 5cm  The IUD was inserted without difficulty and the strings were trimmed  Hemostasis was observed and all instruments were removed  The patient tolerated well

## 2018-06-07 LAB
CHLAMYDIA DNA CVX QL NAA+PROBE: NORMAL
N GONORRHOEA DNA GENITAL QL NAA+PROBE: NORMAL

## 2018-07-03 ENCOUNTER — OFFICE VISIT (OUTPATIENT)
Dept: BEHAVIORAL/MENTAL HEALTH CLINIC | Facility: CLINIC | Age: 23
End: 2018-07-03
Payer: COMMERCIAL

## 2018-07-03 PROCEDURE — 90834 PSYTX W PT 45 MINUTES: CPT | Performed by: SOCIAL WORKER
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
## 2018-07-18 ENCOUNTER — OFFICE VISIT (OUTPATIENT)
Dept: OBGYN CLINIC | Facility: MEDICAL CENTER | Age: 23
End: 2018-07-18
Payer: COMMERCIAL

## 2018-07-18 VITALS
WEIGHT: 155.38 LBS | DIASTOLIC BLOOD PRESSURE: 86 MMHG | HEIGHT: 67 IN | SYSTOLIC BLOOD PRESSURE: 110 MMHG | BODY MASS INDEX: 24.39 KG/M2

## 2018-07-18 DIAGNOSIS — Z30.431 IUD CHECK UP: Primary | ICD-10-CM

## 2018-07-18 DIAGNOSIS — Z97.5 IUD (INTRAUTERINE DEVICE) IN PLACE: ICD-10-CM

## 2018-07-18 PROBLEM — Z30.09 ENCOUNTER FOR OTHER GENERAL COUNSELING AND ADVICE ON CONTRACEPTION: Status: RESOLVED | Noted: 2018-05-25 | Resolved: 2018-07-18

## 2018-07-18 PROBLEM — Z30.430 ENCOUNTER FOR IUD INSERTION: Status: RESOLVED | Noted: 2018-06-06 | Resolved: 2018-07-18

## 2018-07-18 PROCEDURE — 99213 OFFICE O/P EST LOW 20 MIN: CPT | Performed by: NURSE PRACTITIONER

## 2018-07-18 NOTE — ASSESSMENT & PLAN NOTE
Pt denies complaints since placement of Paragard  Exam is WNL and strings present x2, measuring ~3cm in length  Reviewed reasons to call   F/u for routine annual gyn exam or sooner PRN

## 2018-07-18 NOTE — PROGRESS NOTES
Assessment/Plan:    IUD check up  Pt denies complaints since placement of Paragard  Exam is WNL and strings present x2, measuring ~3cm in length  Reviewed reasons to call  F/u for routine annual gyn exam or sooner PRN        Diagnoses and all orders for this visit:    IUD check up    IUD (intrauterine device) in place          Subjective:      Patient ID: Xenia Conway is a 26 y o  female  This patient presents for IUD string check  She denies complaints since insertion and desires to continue  She denies abn bleeding, pelvic pain or discharge   Moods remain stable on current Zoloft dose and she likes therapist   She denies depression/anx  Baby is adorable         The following portions of the patient's history were reviewed and updated as appropriate: allergies, current medications, past family history, past medical history, past social history, past surgical history and problem list     Review of Systems   Constitutional: Negative  HENT: Negative  Eyes: Negative  Respiratory: Negative  Cardiovascular: Negative  Gastrointestinal: Negative  Endocrine: Negative  Genitourinary: Negative  Musculoskeletal: Negative  Skin: Negative  Allergic/Immunologic: Negative  Neurological: Negative  Hematological: Negative  Psychiatric/Behavioral: Negative  Objective:      /86 (BP Location: Right arm, Patient Position: Sitting, Cuff Size: Standard)   Ht 5' 7" (1 702 m)   Wt 70 5 kg (155 lb 6 oz)   LMP 05/26/2017 (Exact Date)   Breastfeeding? Yes   BMI 24 34 kg/m²          Physical Exam   Constitutional: She is oriented to person, place, and time  She appears well-developed and well-nourished  HENT:   Head: Normocephalic and atraumatic  Eyes: Pupils are equal, round, and reactive to light  Neck: Normal range of motion  Pulmonary/Chest: Effort normal    Abdominal: Hernia confirmed negative in the right inguinal area and confirmed negative in the left inguinal area  Genitourinary: Rectum normal and uterus normal  There is no rash, tenderness, lesion or injury on the right labia  There is no rash, tenderness, lesion or injury on the left labia  Cervix exhibits no motion tenderness, no discharge and no friability  Right adnexum displays no mass, no tenderness and no fullness  Left adnexum displays no mass, no tenderness and no fullness  No erythema, tenderness or bleeding in the vagina  No vaginal discharge found  Musculoskeletal: Normal range of motion  Lymphadenopathy:        Right: No inguinal adenopathy present  Left: No inguinal adenopathy present  Neurological: She is alert and oriented to person, place, and time  Skin: Skin is warm and dry  Psychiatric: She has a normal mood and affect   Her behavior is normal  Judgment and thought content normal

## 2018-08-22 ENCOUNTER — TELEPHONE (OUTPATIENT)
Dept: OBGYN CLINIC | Facility: CLINIC | Age: 23
End: 2018-08-22

## 2018-08-22 ENCOUNTER — TELEPHONE (OUTPATIENT)
Dept: BEHAVIORAL/MENTAL HEALTH CLINIC | Facility: CLINIC | Age: 23
End: 2018-08-22

## 2018-08-22 NOTE — TELEPHONE ENCOUNTER
Spoke with pt - explained to pt that letter was faxed per her request but we are not allowed to write any more work excuses every period  Per Lucy Yuan advised her to take 600mg Ibuprofen every 6 hrs, if that does not help to call bck to the office to make an appointment to discuss an alternative options

## 2018-08-22 NOTE — TELEPHONE ENCOUNTER
Patient c/o increasing cramping with menses, stayed home for work due top this and would like a note faxed to her directly for her work place, please advise-fax# [phone]

## 2018-08-22 NOTE — TELEPHONE ENCOUNTER
[id number] Edwige Wheatley to provide a note excusing her for one day from work   Please advise we will not be able to provide note with every period  I would advise ibuprofen 600mg PO q6 hours as needed for pain   If not improved she may schedule and appt to discuss alternate management options

## 2018-09-21 DIAGNOSIS — L70.8 OTHER ACNE: ICD-10-CM

## 2018-09-21 RX ORDER — SERTRALINE HYDROCHLORIDE 25 MG/1
25 TABLET, FILM COATED ORAL DAILY
Qty: 30 TABLET | Refills: 3 | Status: SHIPPED | OUTPATIENT
Start: 2018-09-21 | End: 2018-09-26 | Stop reason: SDUPTHER

## 2018-09-26 ENCOUNTER — TELEPHONE (OUTPATIENT)
Dept: OBGYN CLINIC | Facility: CLINIC | Age: 23
End: 2018-09-26

## 2018-09-26 RX ORDER — SERTRALINE HYDROCHLORIDE 25 MG/1
TABLET, FILM COATED ORAL
Qty: 50 TABLET | Refills: 6 | Status: SHIPPED | OUTPATIENT
Start: 2018-09-26 | End: 2020-02-13 | Stop reason: SDUPTHER

## 2018-09-26 RX ORDER — SERTRALINE HYDROCHLORIDE 25 MG/1
37.5 TABLET, FILM COATED ORAL DAILY
Qty: 135 TABLET | Refills: 0 | Status: CANCELLED | OUTPATIENT
Start: 2018-09-26

## 2018-09-26 NOTE — TELEPHONE ENCOUNTER
[address] KUMAR GONZALEZ CALLED HER RX INTO THE PHARMACY BUT IT IS NOT CORRECT    SHE WAS TAKING 37 5MG OF ZOLOFT, THIS RX IS ONLY FOR 25MG   SHE WANTS TO KNOW WHY THIS WAS CHANGED? WOULD LIKE THE 37 5MG    SHE WAS TAKING 1-1/2 TABLETS BUT IF SHE DOES THAT, SHE WILL RUN OUT    PLEASE CALL PT AT # NOTED AND ADVISE

## 2018-10-15 ENCOUNTER — TELEPHONE (OUTPATIENT)
Dept: FAMILY MEDICINE CLINIC | Facility: MEDICAL CENTER | Age: 23
End: 2018-10-15

## 2018-10-15 DIAGNOSIS — T74.91XA DOMESTIC VIOLENCE OF ADULT, INITIAL ENCOUNTER: Primary | ICD-10-CM

## 2018-10-15 NOTE — TELEPHONE ENCOUNTER
Referral has been placed  Patient should get a phone call from [facility] to set up a counseling visit

## 2018-11-07 ENCOUNTER — OFFICE VISIT (OUTPATIENT)
Dept: OBGYN CLINIC | Facility: MEDICAL CENTER | Age: 23
End: 2018-11-07
Payer: COMMERCIAL

## 2018-11-07 VITALS
SYSTOLIC BLOOD PRESSURE: 108 MMHG | DIASTOLIC BLOOD PRESSURE: 64 MMHG | BODY MASS INDEX: 24.55 KG/M2 | WEIGHT: 156.4 LBS | HEIGHT: 67 IN

## 2018-11-07 DIAGNOSIS — Z77.21 PERSONAL HISTORY OF EXPOSURE TO POTENTIALLY HAZARDOUS BODY FLUIDS: ICD-10-CM

## 2018-11-07 DIAGNOSIS — Z97.5 IUD (INTRAUTERINE DEVICE) IN PLACE: Primary | ICD-10-CM

## 2018-11-07 DIAGNOSIS — B96.89 BV (BACTERIAL VAGINOSIS): ICD-10-CM

## 2018-11-07 DIAGNOSIS — N76.0 BV (BACTERIAL VAGINOSIS): ICD-10-CM

## 2018-11-07 PROCEDURE — 87491 CHLMYD TRACH DNA AMP PROBE: CPT | Performed by: NURSE PRACTITIONER

## 2018-11-07 PROCEDURE — 87591 N.GONORRHOEAE DNA AMP PROB: CPT | Performed by: NURSE PRACTITIONER

## 2018-11-07 PROCEDURE — 99213 OFFICE O/P EST LOW 20 MIN: CPT | Performed by: NURSE PRACTITIONER

## 2018-11-07 RX ORDER — METRONIDAZOLE 500 MG/1
500 TABLET ORAL EVERY 12 HOURS SCHEDULED
Qty: 14 TABLET | Refills: 0 | Status: SHIPPED | OUTPATIENT
Start: 2018-11-07 | End: 2018-11-14

## 2018-11-07 NOTE — ASSESSMENT & PLAN NOTE
Exam c/w same  Advised Flagyl, conservative vulvar hygiene, pelvic rest until sx improve  F/u PRN if sx do not resolve and will recommend pelvic US

## 2018-11-07 NOTE — ASSESSMENT & PLAN NOTE
Gc/chl sent at pt's request  Reviewed importance of condoms with all sexual contact for prevention of STI

## 2018-11-07 NOTE — PROGRESS NOTES
Assessment/Plan:    BV (bacterial vaginosis)  Exam c/w same  Advised Flagyl, conservative vulvar hygiene, pelvic rest until sx improve  F/u PRN if sx do not resolve and will recommend pelvic US  Personal history of exposure to potentially hazardous body fluids  Gc/chl sent at pt's request  Reviewed importance of condoms with all sexual contact for prevention of STI    IUD (intrauterine device) in place  Paragard present  Pt likes the device and desires to continue       Diagnoses and all orders for this visit:    IUD (intrauterine device) in place    BV (bacterial vaginosis)  -     metroNIDAZOLE (FLAGYL) 500 mg tablet; Take 1 tablet (500 mg total) by mouth every 12 (twelve) hours for 7 days    Personal history of exposure to potentially hazardous body fluids          Subjective:      Patient ID: Bernard Christy is a 26 y o  female  This patient presents with c/o discomfort after intercourse   Occurring only when she does not have an orgasm  New partner   Condoms used sometimes   Paragard present  Likes this and plans to continue  Denies abn bleeding, discharge   Open to STI testing     Baby is with her today - he is teething but happy         The following portions of the patient's history were reviewed and updated as appropriate: allergies, current medications, past family history, past medical history, past social history, past surgical history and problem list     Review of Systems   Constitutional: Negative  HENT: Negative  Eyes: Negative  Respiratory: Negative  Cardiovascular: Negative  Gastrointestinal: Negative  Endocrine: Negative  Genitourinary: Positive for dyspareunia  Negative for dysuria, menstrual problem, pelvic pain, vaginal bleeding and vaginal discharge  Musculoskeletal: Negative  Skin: Negative  Allergic/Immunologic: Negative  Neurological: Negative  Hematological: Negative  Psychiatric/Behavioral: Negative            Objective:      /64 (BP Location: Left arm, Cuff Size: Standard)   Ht 5' 7" (1 702 m)   Wt 70 9 kg (156 lb 6 4 oz)   LMP 10/15/2018   BMI 24 50 kg/m²          Physical Exam   Constitutional: She is oriented to person, place, and time  She appears well-developed and well-nourished  HENT:   Head: Normocephalic and atraumatic  Eyes: Pupils are equal, round, and reactive to light  Neck: Normal range of motion  Pulmonary/Chest: Effort normal    Abdominal: Hernia confirmed negative in the right inguinal area and confirmed negative in the left inguinal area  Genitourinary: Rectum normal and uterus normal  There is no rash, tenderness, lesion or injury on the right labia  There is no rash, tenderness, lesion or injury on the left labia  Cervix exhibits no motion tenderness, no discharge and no friability  Right adnexum displays no mass, no tenderness and no fullness  Left adnexum displays no mass, no tenderness and no fullness  No erythema, tenderness or bleeding in the vagina  No vaginal discharge found  Musculoskeletal: Normal range of motion  Lymphadenopathy:        Right: No inguinal adenopathy present  Left: No inguinal adenopathy present  Neurological: She is alert and oriented to person, place, and time  Skin: Skin is warm and dry  Psychiatric: She has a normal mood and affect   Her behavior is normal  Judgment and thought content normal

## 2018-11-12 LAB
C TRACH DNA SPEC QL NAA+PROBE: NEGATIVE
N GONORRHOEA DNA SPEC QL NAA+PROBE: NEGATIVE

## 2018-11-14 ENCOUNTER — TELEPHONE (OUTPATIENT)
Dept: OBGYN CLINIC | Facility: CLINIC | Age: 23
End: 2018-11-14

## 2018-11-14 NOTE — TELEPHONE ENCOUNTER
----- Message from Darrell Horton, [address] sent at 11/14/2018  3:03 PM EST -----  Neg gc/chl  Please notify patient

## 2018-11-21 ENCOUNTER — TELEPHONE (OUTPATIENT)
Dept: OBGYN CLINIC | Facility: MEDICAL CENTER | Age: 23
End: 2018-11-21

## 2018-12-31 ENCOUNTER — TELEPHONE (OUTPATIENT)
Dept: OBGYN CLINIC | Age: 23
End: 2018-12-31

## 2018-12-31 DIAGNOSIS — N30.00 ACUTE CYSTITIS WITHOUT HEMATURIA: Primary | ICD-10-CM

## 2018-12-31 DIAGNOSIS — R35.0 URINARY FREQUENCY: Primary | ICD-10-CM

## 2018-12-31 RX ORDER — NITROFURANTOIN 25; 75 MG/1; MG/1
100 CAPSULE ORAL 2 TIMES DAILY
Qty: 14 CAPSULE | Refills: 0 | Status: SHIPPED | OUTPATIENT
Start: 2018-12-31 | End: 2019-01-07

## 2018-12-31 NOTE — TELEPHONE ENCOUNTER
Pt has white dsch and itching  In early Nov - was treated for bv with flagyl  She had bad odor  No odor presently  Pt also has frequency and burning with urination  Will go for u/a Wed AM  Slips entered  May we order her diflucan?  She is breast feeding

## 2018-12-31 NOTE — TELEPHONE ENCOUNTER
I lm for pt - macrobid at pharm  Not worth going for u/a as she cannot go till Wed - will have been on macrobid x 2 days  Pt also to use otc monistat x 7    I had to lm - pt did not answer

## 2018-12-31 NOTE — TELEPHONE ENCOUNTER
Patient is having pain with urination external itching and white discharge  Patient is still nursing baby is 8 months  Please call patient  Pharmacy [facility]

## 2018-12-31 NOTE — TELEPHONE ENCOUNTER
Please advise a course of monistat 7 for the candida sx   I will also send rx for macrobid   Please encourage pushing fluids and review sx of pyelo to report

## 2019-01-08 ENCOUNTER — OFFICE VISIT (OUTPATIENT)
Dept: OBGYN CLINIC | Facility: MEDICAL CENTER | Age: 24
End: 2019-01-08
Payer: COMMERCIAL

## 2019-01-08 ENCOUNTER — APPOINTMENT (OUTPATIENT)
Dept: LAB | Facility: MEDICAL CENTER | Age: 24
End: 2019-01-08
Payer: COMMERCIAL

## 2019-01-08 VITALS — DIASTOLIC BLOOD PRESSURE: 80 MMHG | WEIGHT: 152.8 LBS | BODY MASS INDEX: 23.93 KG/M2 | SYSTOLIC BLOOD PRESSURE: 106 MMHG

## 2019-01-08 DIAGNOSIS — Z11.3 SCREENING FOR STD (SEXUALLY TRANSMITTED DISEASE): ICD-10-CM

## 2019-01-08 DIAGNOSIS — N30.00 ACUTE CYSTITIS WITHOUT HEMATURIA: ICD-10-CM

## 2019-01-08 DIAGNOSIS — Z11.3 SCREENING FOR STD (SEXUALLY TRANSMITTED DISEASE): Primary | ICD-10-CM

## 2019-01-08 PROCEDURE — 86803 HEPATITIS C AB TEST: CPT

## 2019-01-08 PROCEDURE — 36415 COLL VENOUS BLD VENIPUNCTURE: CPT

## 2019-01-08 PROCEDURE — 87389 HIV-1 AG W/HIV-1&-2 AB AG IA: CPT

## 2019-01-08 PROCEDURE — 87591 N.GONORRHOEAE DNA AMP PROB: CPT | Performed by: PHYSICIAN ASSISTANT

## 2019-01-08 PROCEDURE — 87491 CHLMYD TRACH DNA AMP PROBE: CPT | Performed by: PHYSICIAN ASSISTANT

## 2019-01-08 PROCEDURE — 86592 SYPHILIS TEST NON-TREP QUAL: CPT

## 2019-01-08 PROCEDURE — 87340 HEPATITIS B SURFACE AG IA: CPT

## 2019-01-08 PROCEDURE — 99212 OFFICE O/P EST SF 10 MIN: CPT | Performed by: PHYSICIAN ASSISTANT

## 2019-01-08 NOTE — PROGRESS NOTES
Assessment/Plan:    Screening for STD (sexually transmitted disease)  Will screen for STDs per pt request  Discussed safe sex practices, limiting sexual partners  Will f/u with results when available       Diagnoses and all orders for this visit:    Screening for STD (sexually transmitted disease)  -     Chlamydia/GC amplified DNA by PCR  -     HIV 1/2 AG-AB combo; Future  -     Hepatitis C antibody; Future  -     Hepatitis B surface antigen; Future  -     RPR; Future        Subjective:      Patient ID: Mary Jane Olmedo is a 26 y o  female  Pt presents for STD screening  Has new sexual partner  No pelvic pain, abnormal bleeding, abnormal vag d/c    H/o CT in past    Paragard IUD for contraception        The following portions of the patient's history were reviewed and updated as appropriate: allergies, current medications, past family history, past medical history, past social history, past surgical history and problem list     Review of Systems   Constitutional: Negative for appetite change, fatigue and unexpected weight change  Respiratory: Negative for chest tightness and shortness of breath  Cardiovascular: Negative for chest pain, palpitations and leg swelling  Gastrointestinal: Negative for abdominal pain, constipation, diarrhea, nausea and vomiting  Genitourinary: Negative for difficulty urinating, dyspareunia, menstrual problem, pelvic pain and vaginal discharge  Musculoskeletal: Negative for arthralgias and myalgias  Neurological: Negative for dizziness, light-headedness and headaches  Psychiatric/Behavioral: Negative for dysphoric mood  The patient is not nervous/anxious  All other systems reviewed and are negative  Objective:      /80 (BP Location: Left arm, Patient Position: Sitting, Cuff Size: Standard)   Wt 69 3 kg (152 lb 12 8 oz)   LMP 01/02/2019   Breastfeeding?  No   BMI 23 93 kg/m²          Physical Exam   Constitutional: She is oriented to person, place, and time  She appears well-developed and well-nourished  HENT:   Head: Normocephalic and atraumatic  Neurological: She is alert and oriented to person, place, and time  Skin: Skin is warm and dry  Psychiatric: She has a normal mood and affect  Nursing note and vitals reviewed

## 2019-01-08 NOTE — ASSESSMENT & PLAN NOTE
Will screen for STDs per pt request  Discussed safe sex practices, limiting sexual partners  Will f/u with results when available

## 2019-01-09 ENCOUNTER — TELEPHONE (OUTPATIENT)
Dept: OBGYN CLINIC | Facility: CLINIC | Age: 24
End: 2019-01-09

## 2019-01-09 LAB
C TRACH DNA SPEC QL NAA+PROBE: NEGATIVE
HBV SURFACE AG SER QL: NORMAL
HCV AB SER QL: NORMAL
HIV 1+2 AB+HIV1 P24 AG SERPL QL IA: NORMAL
N GONORRHOEA DNA SPEC QL NAA+PROBE: NEGATIVE
RPR SER QL: NORMAL

## 2019-01-10 NOTE — TELEPHONE ENCOUNTER
Pt would like to know if can fax over results to her  Please call if it's ok  Fax # [phone] it is pt own personal fax

## 2019-05-01 ENCOUNTER — HOSPITAL ENCOUNTER (EMERGENCY)
Facility: HOSPITAL | Age: 24
Discharge: HOME | End: 2019-05-01
Attending: EMERGENCY MEDICINE | Admitting: EMERGENCY MEDICINE
Payer: COMMERCIAL

## 2019-05-01 VITALS
BODY MASS INDEX: 23.93 KG/M2 | WEIGHT: 152.78 LBS | OXYGEN SATURATION: 98 % | RESPIRATION RATE: 18 BRPM | DIASTOLIC BLOOD PRESSURE: 63 MMHG | HEART RATE: 77 BPM | TEMPERATURE: 98.4 F | SYSTOLIC BLOOD PRESSURE: 131 MMHG

## 2019-05-01 DIAGNOSIS — Z20.2 STD EXPOSURE: Primary | ICD-10-CM

## 2019-05-01 LAB
BACTERIA UR QL AUTO: ABNORMAL /HPF
BILIRUB UR QL STRIP: NEGATIVE
CLARITY UR: ABNORMAL
COLOR UR: YELLOW
EXT PREG TEST URINE: NEGATIVE
GLUCOSE UR STRIP-MCNC: NEGATIVE MG/DL
HGB UR QL STRIP.AUTO: NEGATIVE
KETONES UR STRIP-MCNC: NEGATIVE MG/DL
LEUKOCYTE ESTERASE UR QL STRIP: ABNORMAL
MUCOUS THREADS UR QL AUTO: ABNORMAL
NITRITE UR QL STRIP: NEGATIVE
NON-SQ EPI CELLS URNS QL MICRO: ABNORMAL /HPF
PH UR STRIP.AUTO: 6 [PH]
PROT UR STRIP-MCNC: NEGATIVE MG/DL
RBC #/AREA URNS AUTO: ABNORMAL /HPF
SP GR UR STRIP.AUTO: 1.02 (ref 1–1.03)
UROBILINOGEN UR QL STRIP.AUTO: 0.2 E.U./DL
WBC #/AREA URNS AUTO: ABNORMAL /HPF

## 2019-05-01 PROCEDURE — 87491 CHLMYD TRACH DNA AMP PROBE: CPT | Performed by: EMERGENCY MEDICINE

## 2019-05-01 PROCEDURE — 86780 TREPONEMA PALLIDUM: CPT | Performed by: EMERGENCY MEDICINE

## 2019-05-01 PROCEDURE — 81025 URINE PREGNANCY TEST: CPT | Performed by: EMERGENCY MEDICINE

## 2019-05-01 PROCEDURE — 87591 N.GONORRHOEAE DNA AMP PROB: CPT | Performed by: EMERGENCY MEDICINE

## 2019-05-01 PROCEDURE — 99283 EMERGENCY DEPT VISIT LOW MDM: CPT

## 2019-05-01 PROCEDURE — 81001 URINALYSIS AUTO W/SCOPE: CPT | Performed by: EMERGENCY MEDICINE

## 2019-05-01 PROCEDURE — 36415 COLL VENOUS BLD VENIPUNCTURE: CPT | Performed by: EMERGENCY MEDICINE

## 2019-05-01 PROCEDURE — 99283 EMERGENCY DEPT VISIT LOW MDM: CPT | Performed by: EMERGENCY MEDICINE

## 2019-05-01 PROCEDURE — 96372 THER/PROPH/DIAG INJ SC/IM: CPT

## 2019-05-01 RX ORDER — ONDANSETRON 4 MG/1
4 TABLET, ORALLY DISINTEGRATING ORAL ONCE
Status: COMPLETED | OUTPATIENT
Start: 2019-05-01 | End: 2019-05-01

## 2019-05-01 RX ORDER — METRONIDAZOLE 500 MG/1
2000 TABLET ORAL ONCE
Status: COMPLETED | OUTPATIENT
Start: 2019-05-01 | End: 2019-05-01

## 2019-05-01 RX ORDER — GENTAMICIN SULFATE 40 MG/ML
240 INJECTION, SOLUTION INTRAMUSCULAR; INTRAVENOUS ONCE
Status: COMPLETED | OUTPATIENT
Start: 2019-05-01 | End: 2019-05-01

## 2019-05-01 RX ORDER — AZITHROMYCIN 250 MG/1
1000 TABLET, FILM COATED ORAL ONCE
Status: COMPLETED | OUTPATIENT
Start: 2019-05-01 | End: 2019-05-01

## 2019-05-01 RX ADMIN — METRONIDAZOLE 2000 MG: 500 TABLET ORAL at 03:11

## 2019-05-01 RX ADMIN — AZITHROMYCIN 1000 MG: 250 TABLET, FILM COATED ORAL at 03:12

## 2019-05-01 RX ADMIN — ONDANSETRON 4 MG: 4 TABLET, ORALLY DISINTEGRATING ORAL at 03:11

## 2019-05-01 RX ADMIN — GENTAMICIN SULFATE 240 MG: 40 INJECTION, SOLUTION INTRAMUSCULAR; INTRAVENOUS at 03:31

## 2019-05-02 LAB
C TRACH DNA SPEC QL NAA+PROBE: NEGATIVE
N GONORRHOEA DNA SPEC QL NAA+PROBE: NEGATIVE
T PALLIDUM AB SER QL IF: NON REACTIVE

## 2019-05-23 ENCOUNTER — OFFICE VISIT (OUTPATIENT)
Dept: OBGYN CLINIC | Facility: MEDICAL CENTER | Age: 24
End: 2019-05-23
Payer: COMMERCIAL

## 2019-05-23 VITALS — WEIGHT: 155 LBS | DIASTOLIC BLOOD PRESSURE: 72 MMHG | BODY MASS INDEX: 24.28 KG/M2 | SYSTOLIC BLOOD PRESSURE: 114 MMHG

## 2019-05-23 DIAGNOSIS — N89.8 VAGINAL DISCHARGE: Primary | ICD-10-CM

## 2019-05-23 DIAGNOSIS — R30.0 BURNING WITH URINATION: ICD-10-CM

## 2019-05-23 DIAGNOSIS — Z12.4 CERVICAL CANCER SCREENING: ICD-10-CM

## 2019-05-23 PROCEDURE — 99213 OFFICE O/P EST LOW 20 MIN: CPT | Performed by: PHYSICIAN ASSISTANT

## 2019-05-23 PROCEDURE — 87591 N.GONORRHOEAE DNA AMP PROB: CPT | Performed by: PHYSICIAN ASSISTANT

## 2019-05-23 PROCEDURE — 87480 CANDIDA DNA DIR PROBE: CPT | Performed by: PHYSICIAN ASSISTANT

## 2019-05-23 PROCEDURE — 87510 GARDNER VAG DNA DIR PROBE: CPT | Performed by: PHYSICIAN ASSISTANT

## 2019-05-23 PROCEDURE — 87491 CHLMYD TRACH DNA AMP PROBE: CPT | Performed by: PHYSICIAN ASSISTANT

## 2019-05-23 PROCEDURE — G0145 SCR C/V CYTO,THINLAYER,RESCR: HCPCS | Performed by: PHYSICIAN ASSISTANT

## 2019-05-23 PROCEDURE — 87660 TRICHOMONAS VAGIN DIR PROBE: CPT | Performed by: PHYSICIAN ASSISTANT

## 2019-05-23 RX ORDER — METRONIDAZOLE 500 MG/1
500 TABLET ORAL EVERY 12 HOURS SCHEDULED
Qty: 14 TABLET | Refills: 0 | Status: SHIPPED | OUTPATIENT
Start: 2019-05-23 | End: 2019-05-30

## 2019-05-24 LAB
C TRACH DNA SPEC QL NAA+PROBE: NEGATIVE
N GONORRHOEA DNA SPEC QL NAA+PROBE: NEGATIVE

## 2019-05-25 LAB
CANDIDA RRNA VAG QL PROBE: NEGATIVE
G VAGINALIS RRNA GENITAL QL PROBE: NEGATIVE
T VAGINALIS RRNA GENITAL QL PROBE: NEGATIVE

## 2019-05-28 ENCOUNTER — TELEPHONE (OUTPATIENT)
Dept: OBGYN CLINIC | Facility: CLINIC | Age: 24
End: 2019-05-28

## 2019-05-31 ENCOUNTER — TELEPHONE (OUTPATIENT)
Dept: OBGYN CLINIC | Facility: CLINIC | Age: 24
End: 2019-05-31

## 2019-05-31 LAB
LAB AP GYN PRIMARY INTERPRETATION: NORMAL
Lab: NORMAL

## 2019-07-12 ENCOUNTER — OFFICE VISIT (OUTPATIENT)
Dept: OBGYN CLINIC | Facility: CLINIC | Age: 24
End: 2019-07-12
Payer: COMMERCIAL

## 2019-07-12 VITALS — DIASTOLIC BLOOD PRESSURE: 68 MMHG | BODY MASS INDEX: 23.49 KG/M2 | SYSTOLIC BLOOD PRESSURE: 120 MMHG | WEIGHT: 150 LBS

## 2019-07-12 DIAGNOSIS — N90.89 VULVAR LESION: ICD-10-CM

## 2019-07-12 DIAGNOSIS — B37.3 CANDIDIASIS OF VULVA AND VAGINA: Primary | ICD-10-CM

## 2019-07-12 DIAGNOSIS — Z11.3 SCREEN FOR STD (SEXUALLY TRANSMITTED DISEASE): ICD-10-CM

## 2019-07-12 DIAGNOSIS — R30.0 DYSURIA: ICD-10-CM

## 2019-07-12 PROBLEM — N89.8 VAGINAL DISCHARGE: Status: RESOLVED | Noted: 2019-05-23 | Resolved: 2019-07-12

## 2019-07-12 PROBLEM — B37.31 CANDIDIASIS OF VULVA AND VAGINA: Status: ACTIVE | Noted: 2019-07-12

## 2019-07-12 PROBLEM — Z77.21 PERSONAL HISTORY OF EXPOSURE TO POTENTIALLY HAZARDOUS BODY FLUIDS: Status: RESOLVED | Noted: 2018-11-07 | Resolved: 2019-07-12

## 2019-07-12 PROBLEM — Z30.431 IUD CHECK UP: Status: RESOLVED | Noted: 2018-07-18 | Resolved: 2019-07-12

## 2019-07-12 PROCEDURE — 87086 URINE CULTURE/COLONY COUNT: CPT | Performed by: NURSE PRACTITIONER

## 2019-07-12 PROCEDURE — 99213 OFFICE O/P EST LOW 20 MIN: CPT | Performed by: NURSE PRACTITIONER

## 2019-07-12 PROCEDURE — 87491 CHLMYD TRACH DNA AMP PROBE: CPT | Performed by: NURSE PRACTITIONER

## 2019-07-12 PROCEDURE — 87529 HSV DNA AMP PROBE: CPT | Performed by: NURSE PRACTITIONER

## 2019-07-12 PROCEDURE — 87147 CULTURE TYPE IMMUNOLOGIC: CPT | Performed by: NURSE PRACTITIONER

## 2019-07-12 PROCEDURE — 87591 N.GONORRHOEAE DNA AMP PROB: CPT | Performed by: NURSE PRACTITIONER

## 2019-07-12 RX ORDER — FLUCONAZOLE 150 MG/1
TABLET ORAL
Qty: 2 TABLET | Refills: 0 | Status: SHIPPED | OUTPATIENT
Start: 2019-07-12 | End: 2019-07-15

## 2019-07-12 RX ORDER — NYSTATIN 100000 U/G
OINTMENT TOPICAL 3 TIMES DAILY
Qty: 30 G | Refills: 0 | Status: SHIPPED | OUTPATIENT
Start: 2019-07-12 | End: 2020-04-03

## 2019-07-12 NOTE — ASSESSMENT & PLAN NOTE
Exam consistent with vulvovaginal candidiasis  Recommended diflucan, nystatin ointment and conservative vulvar hygiene

## 2019-07-12 NOTE — ASSESSMENT & PLAN NOTE
Area of vulvar erosion present on exam  The patient reports this is her most pruritic area thus excoriation 2ndary to scratching suspected, but HSV culture was recommended today to rule out herpetic etiology  She agrees to plan

## 2019-07-12 NOTE — PROGRESS NOTES
Assessment/Plan:    Candidiasis of vulva and vagina  Exam consistent with vulvovaginal candidiasis  Recommended diflucan, nystatin ointment and conservative vulvar hygiene  Vulvar lesion  Area of vulvar erosion present on exam  The patient reports this is her most pruritic area thus excoriation 2ndary to scratching suspected, but HSV culture was recommended today to rule out herpetic etiology  She agrees to plan  Diagnoses and all orders for this visit:    Candidiasis of vulva and vagina  -     fluconazole (DIFLUCAN) 150 mg tablet; One tablet by mouth on day one and one tablet by mouth on day four  -     nystatin (MYCOSTATIN) ointment; Apply topically 3 (three) times a day    Screen for STD (sexually transmitted disease)  -     Chlamydia/GC amplified DNA by PCR    Dysuria  -     Urine culture  -     HSV TYPE 1,2 DNA PCR    Vulvar lesion          Subjective:      Patient ID: Barby Beaulieu is a 27 y o  female  This patient presents with c/o vulvar itching   Present x4 days   Also with pain when urine hits skin in itchy spot  She thinks maybe slightly more urinary freq as well but denies jesse dysuria, hematuria, flank pain  Denies discharge, abn bleeding, pelvic pain   Likes paragard IUD and desires to continue       The following portions of the patient's history were reviewed and updated as appropriate: allergies, current medications, past family history, past medical history, past social history, past surgical history and problem list     Review of Systems   Constitutional: Negative  Respiratory: Negative  Cardiovascular: Negative  Gastrointestinal: Negative  Genitourinary: Positive for frequency  Negative for dyspareunia, dysuria, flank pain, genital sores, hematuria, menstrual problem, pelvic pain, vaginal bleeding and vaginal discharge  Vulvar itching    Musculoskeletal: Negative  Skin: Negative  Neurological: Negative  Psychiatric/Behavioral: Negative  Objective:      /68 (BP Location: Right arm, Patient Position: Sitting, Cuff Size: Standard)   Wt 68 kg (150 lb)   LMP 06/28/2019 (Exact Date)   BMI 23 49 kg/m²          Physical Exam   Constitutional: She is oriented to person, place, and time  She appears well-developed and well-nourished  HENT:   Head: Normocephalic and atraumatic  Eyes: Pupils are equal, round, and reactive to light  Neck: Normal range of motion  Pulmonary/Chest: Effort normal    Abdominal: Hernia confirmed negative in the right inguinal area and confirmed negative in the left inguinal area  Genitourinary: Rectum normal and uterus normal        There is lesion on the right labia  There is no rash, tenderness or injury on the right labia  There is no rash, tenderness, lesion or injury on the left labia  Cervix exhibits no motion tenderness, no discharge and no friability  Right adnexum displays no mass, no tenderness and no fullness  Left adnexum displays no mass, no tenderness and no fullness  No erythema, tenderness or bleeding in the vagina  Vaginal discharge found  Musculoskeletal: Normal range of motion  Lymphadenopathy:        Right: No inguinal adenopathy present  Left: No inguinal adenopathy present  Neurological: She is alert and oriented to person, place, and time  Skin: Skin is warm and dry  Psychiatric: She has a normal mood and affect   Her behavior is normal  Judgment and thought content normal

## 2019-07-13 LAB
C TRACH DNA SPEC QL NAA+PROBE: NEGATIVE
N GONORRHOEA DNA SPEC QL NAA+PROBE: NEGATIVE

## 2019-07-14 LAB
BACTERIA UR CULT: ABNORMAL
BACTERIA UR CULT: ABNORMAL

## 2019-07-15 LAB
HSV1 DNA SPEC QL NAA+PROBE: NEGATIVE
HSV2 DNA SPEC QL NAA+PROBE: NEGATIVE

## 2019-07-16 ENCOUNTER — TELEPHONE (OUTPATIENT)
Dept: OBGYN CLINIC | Facility: CLINIC | Age: 24
End: 2019-07-16

## 2019-07-16 NOTE — TELEPHONE ENCOUNTER
LMOM today @ [phone] for Pt to call back office regarding Pt's recent HSV result  Attempted to leave message @ [phone], answering service states said number is no longer in service

## 2019-07-16 NOTE — TELEPHONE ENCOUNTER
----- Message from Karen Davis, [address] sent at 7/16/2019  9:08 AM EDT -----  Neg HSV culture   Please notify patient    Please also advise that urine culture result is within acceptable limits

## 2019-07-18 NOTE — TELEPHONE ENCOUNTER
Pt returned call to office today  Pt informed that her recent HSV culture result was negative (normal) and her recent urine culture result was within acceptable limits per JONAS Koo's review of said results  Reiterated to Pt that if she has any questions/concerns to contact office

## 2019-07-26 ENCOUNTER — TELEPHONE (OUTPATIENT)
Dept: FAMILY MEDICINE CLINIC | Facility: MEDICAL CENTER | Age: 24
End: 2019-07-26

## 2019-07-26 NOTE — TELEPHONE ENCOUNTER
Patient called the office requesting a referral to psychiatry  Referral that was placed previously   Pt is aware provider is out of office      Routed to Dr Michelle Wilkerson

## 2019-07-29 NOTE — TELEPHONE ENCOUNTER
Referral generated  Please provide patient with the phone number for [facility] as she will need to initiate a phone call

## 2019-07-29 NOTE — TELEPHONE ENCOUNTER
LMOM for patient to call the office  Phone # for psychiatry is [phone], she will need to reach out to them  Will mail her the order

## 2020-01-15 ENCOUNTER — PROCEDURE VISIT (OUTPATIENT)
Dept: OBGYN CLINIC | Facility: MEDICAL CENTER | Age: 25
End: 2020-01-15
Payer: COMMERCIAL

## 2020-01-15 VITALS — WEIGHT: 150.4 LBS | SYSTOLIC BLOOD PRESSURE: 110 MMHG | DIASTOLIC BLOOD PRESSURE: 72 MMHG | BODY MASS INDEX: 23.56 KG/M2

## 2020-01-15 DIAGNOSIS — Z30.432 ENCOUNTER FOR IUD REMOVAL: Primary | ICD-10-CM

## 2020-01-15 DIAGNOSIS — Z30.09 GENERAL COUNSELING AND ADVICE FOR CONTRACEPTIVE MANAGEMENT: ICD-10-CM

## 2020-01-15 PROCEDURE — 58301 REMOVE INTRAUTERINE DEVICE: CPT | Performed by: NURSE PRACTITIONER

## 2020-01-15 PROCEDURE — 99213 OFFICE O/P EST LOW 20 MIN: CPT | Performed by: NURSE PRACTITIONER

## 2020-01-15 RX ORDER — ETONOGESTREL AND ETHINYL ESTRADIOL 11.7; 2.7 MG/1; MG/1
INSERT, EXTENDED RELEASE VAGINAL
Qty: 3 EACH | Refills: 4 | Status: SHIPPED | OUTPATIENT
Start: 2020-01-15 | End: 2021-03-17

## 2020-01-15 NOTE — PROGRESS NOTES
Iud removal  Date/Time: 1/15/2020 3:10 PM  Performed by: JONAS Brand  Authorized by: JONAS Brand     Consent:     Consent obtained:  Verbal    Consent given by:  Patient    Procedure risks and benefits discussed: yes      Patient questions answered: yes      Patient agrees, verbalizes understanding, and wants to proceed: yes    Procedure:     Removed with no complications: yes      Other reason for removal:  Cramping since insertion  Comments: The patient presents for IUD removal  We discussed risks/benefits, SE's/AE's of the procedure  All questions were answered and consent was obtained  Allergies were confirmed and time out was performed  The patient was positioned in lithotomy and spec was inserted  The IUD strings were visualized at the os and they were grasped with ring forceps  Gentle traction was applied and the device was removed without difficulty  The device was noted to be intact and this was discarded after the patient visualized it as well  Hemostasis was observed and all instruments were removed  The patient tolerated well   She is aware fertility is restored upon removal

## 2020-01-15 NOTE — PROGRESS NOTES
Assessment/Plan:    Encounter for IUD removal  IUD removed without difficulty  The patient tolerated well  General counseling and advice for contraceptive management  Tanya Glez requests trial of Nuvaring  We discussed risks/benefits, SEs/AEs, directions for use and alternatives  She denies personal/FH VTE risk factors  Advised condoms as back up through first full cycle at least, then continued condom use for prevention of STI  F/u for routine annual when due or sooner PRN        Diagnoses and all orders for this visit:    Encounter for IUD removal  -     Iud removal    General counseling and advice for contraceptive management  -     etonogestrel-ethinyl estradiol (NUVARING) 0 12-0 015 MG/24HR vaginal ring; Insert vaginally and leave in place for 3 consecutive weeks, then remove for 1 week  Subjective:      Patient ID: David Holman is a 27 y o  female  This patient presents requesting IUD removal and to start [address] IUD present since 6/2018  Since insertion she has noted uncomfortable low pelvic cramping   Menstrual features are otherwise unchanged - denies menorrhagia   She desires trial of Nuvaring   Denies personal/FH VTE risks     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    Son is almost 2      The following portions of the patient's history were reviewed and updated as appropriate: allergies, current medications, past family history, past medical history, past social history, past surgical history and problem list     Review of Systems   Constitutional: Negative  Respiratory: Negative  Cardiovascular: Negative  Gastrointestinal: Negative  Genitourinary: Negative  Cramping   Musculoskeletal: Negative  Skin: Negative  Neurological: Negative  Psychiatric/Behavioral: Negative            Objective:      /72 (BP Location: Left arm, Patient Position: Sitting, Cuff Size: Standard)   Wt 68 2 kg (150 lb 6 4 oz)   LMP 01/13/2020   BMI 23 56 kg/m²          Physical Exam   Constitutional: She is oriented to person, place, and time  She appears well-developed and well-nourished  HENT:   Head: Normocephalic and atraumatic  Eyes: Pupils are equal, round, and reactive to light  Neck: Normal range of motion  Pulmonary/Chest: Effort normal    Abdominal: Hernia confirmed negative in the right inguinal area and confirmed negative in the left inguinal area  Genitourinary: Rectum normal and uterus normal  There is no rash, tenderness, lesion or injury on the right labia  There is no rash, tenderness, lesion or injury on the left labia  Cervix exhibits no motion tenderness, no discharge and no friability  Right adnexum displays no mass, no tenderness and no fullness  Left adnexum displays no mass, no tenderness and no fullness  No erythema, tenderness or bleeding in the vagina  No vaginal discharge found  Musculoskeletal: Normal range of motion  Lymphadenopathy:        Right: No inguinal adenopathy present  Left: No inguinal adenopathy present  Neurological: She is alert and oriented to person, place, and time  Skin: Skin is warm and dry  Psychiatric: She has a normal mood and affect   Her behavior is normal  Judgment and thought content normal

## 2020-01-15 NOTE — ASSESSMENT & PLAN NOTE
[facility] requests trial of Nuvaring  We discussed risks/benefits, SEs/AEs, directions for use and alternatives  She denies personal/FH VTE risk factors  Advised condoms as back up through first full cycle at least, then continued condom use for prevention of STI   F/u for routine annual when due or sooner PRN

## 2020-01-16 ENCOUNTER — TELEPHONE (OUTPATIENT)
Dept: OBGYN CLINIC | Facility: CLINIC | Age: 25
End: 2020-01-16

## 2020-01-16 NOTE — TELEPHONE ENCOUNTER
Patient called said she picked up her nuvaring yesterday and there was only 1 in the packet  She wants to know if this is the way it should be   Please advise

## 2020-02-07 RX ORDER — SERTRALINE HYDROCHLORIDE 25 MG/1
TABLET, FILM COATED ORAL
Qty: 50 TABLET | Refills: 0 | Status: CANCELLED | OUTPATIENT
Start: 2020-02-07

## 2020-02-13 RX ORDER — SERTRALINE HYDROCHLORIDE 25 MG/1
TABLET, FILM COATED ORAL
Qty: 50 TABLET | Refills: 6 | Status: SHIPPED | OUTPATIENT
Start: 2020-02-13 | End: 2020-10-21 | Stop reason: SDUPTHER

## 2020-02-24 ENCOUNTER — TELEPHONE (OUTPATIENT)
Dept: OBGYN CLINIC | Facility: CLINIC | Age: 25
End: 2020-02-24

## 2020-02-24 ENCOUNTER — APPOINTMENT (OUTPATIENT)
Dept: LAB | Facility: MEDICAL CENTER | Age: 25
End: 2020-02-24
Payer: COMMERCIAL

## 2020-02-24 ENCOUNTER — OFFICE VISIT (OUTPATIENT)
Dept: OBGYN CLINIC | Facility: MEDICAL CENTER | Age: 25
End: 2020-02-24
Payer: COMMERCIAL

## 2020-02-24 VITALS
BODY MASS INDEX: 24.45 KG/M2 | WEIGHT: 155.8 LBS | HEIGHT: 67 IN | SYSTOLIC BLOOD PRESSURE: 116 MMHG | DIASTOLIC BLOOD PRESSURE: 84 MMHG

## 2020-02-24 DIAGNOSIS — R35.0 URINARY FREQUENCY: Primary | ICD-10-CM

## 2020-02-24 DIAGNOSIS — N89.8 VAGINAL DISCHARGE: ICD-10-CM

## 2020-02-24 DIAGNOSIS — Z11.3 SCREENING FOR STDS (SEXUALLY TRANSMITTED DISEASES): ICD-10-CM

## 2020-02-24 LAB
BACTERIA UR QL AUTO: ABNORMAL /HPF
BILIRUB UR QL STRIP: NEGATIVE
CLARITY UR: CLEAR
COLOR UR: YELLOW
GLUCOSE UR STRIP-MCNC: NEGATIVE MG/DL
HBV SURFACE AG SER QL: NORMAL
HCV AB SER QL: NORMAL
HGB UR QL STRIP.AUTO: NEGATIVE
HYALINE CASTS #/AREA URNS LPF: ABNORMAL /LPF
KETONES UR STRIP-MCNC: NEGATIVE MG/DL
LEUKOCYTE ESTERASE UR QL STRIP: NEGATIVE
NITRITE UR QL STRIP: NEGATIVE
NON-SQ EPI CELLS URNS QL MICRO: ABNORMAL /HPF
PH UR STRIP.AUTO: 7 [PH]
PROT UR STRIP-MCNC: NEGATIVE MG/DL
RBC #/AREA URNS AUTO: ABNORMAL /HPF
SL AMB  POCT GLUCOSE, UA: ABNORMAL
SL AMB LEUKOCYTE ESTERASE,UA: ABNORMAL
SL AMB POCT BILIRUBIN,UA: ABNORMAL
SL AMB POCT BLOOD,UA: ABNORMAL
SL AMB POCT CLARITY,UA: CLEAR
SL AMB POCT COLOR,UA: YELLOW
SL AMB POCT KETONES,UA: ABNORMAL
SL AMB POCT NITRITE,UA: ABNORMAL
SL AMB POCT PH,UA: ABNORMAL
SL AMB POCT SPECIFIC GRAVITY,UA: 3
SL AMB POCT URINE PROTEIN: ABNORMAL
SL AMB POCT UROBILINOGEN: ABNORMAL
SP GR UR STRIP.AUTO: 1.02 (ref 1–1.03)
UROBILINOGEN UR QL STRIP.AUTO: 0.2 E.U./DL
WBC #/AREA URNS AUTO: ABNORMAL /HPF

## 2020-02-24 PROCEDURE — 36415 COLL VENOUS BLD VENIPUNCTURE: CPT

## 2020-02-24 PROCEDURE — 87077 CULTURE AEROBIC IDENTIFY: CPT | Performed by: NURSE PRACTITIONER

## 2020-02-24 PROCEDURE — 81002 URINALYSIS NONAUTO W/O SCOPE: CPT | Performed by: NURSE PRACTITIONER

## 2020-02-24 PROCEDURE — 87389 HIV-1 AG W/HIV-1&-2 AB AG IA: CPT

## 2020-02-24 PROCEDURE — 87491 CHLMYD TRACH DNA AMP PROBE: CPT | Performed by: NURSE PRACTITIONER

## 2020-02-24 PROCEDURE — 86592 SYPHILIS TEST NON-TREP QUAL: CPT

## 2020-02-24 PROCEDURE — 87591 N.GONORRHOEAE DNA AMP PROB: CPT | Performed by: NURSE PRACTITIONER

## 2020-02-24 PROCEDURE — 86803 HEPATITIS C AB TEST: CPT

## 2020-02-24 PROCEDURE — 87340 HEPATITIS B SURFACE AG IA: CPT

## 2020-02-24 PROCEDURE — 87186 SC STD MICRODIL/AGAR DIL: CPT | Performed by: NURSE PRACTITIONER

## 2020-02-24 PROCEDURE — 87210 SMEAR WET MOUNT SALINE/INK: CPT | Performed by: NURSE PRACTITIONER

## 2020-02-24 PROCEDURE — 87086 URINE CULTURE/COLONY COUNT: CPT | Performed by: NURSE PRACTITIONER

## 2020-02-24 PROCEDURE — 81001 URINALYSIS AUTO W/SCOPE: CPT | Performed by: NURSE PRACTITIONER

## 2020-02-24 PROCEDURE — 99214 OFFICE O/P EST MOD 30 MIN: CPT | Performed by: NURSE PRACTITIONER

## 2020-02-24 RX ORDER — FLUCONAZOLE 150 MG/1
TABLET ORAL
Qty: 2 TABLET | Refills: 0 | Status: SHIPPED | OUTPATIENT
Start: 2020-02-24 | End: 2020-02-27

## 2020-02-24 NOTE — PATIENT INSTRUCTIONS
Vulvovaginal Candidiasis   AMBULATORY CARE:   Vulvovaginal candidiasis,  or yeast infection, is a common vaginal infection  Vulvovaginal candidiasis is caused by a fungus, or yeast-like germ  Fungi are normally found in your vagina  When there are too many fungi, it can cause an infection  Seek care immediately if:   · You have fever and chills  · You are bleeding from your vagina and it is not your monthly period  · You develop abdominal or pelvic pain  Contact your healthcare provider if:   · Your signs and symptoms get worse, even after treatment  · You have questions or concerns about your condition or care  Signs and symptoms:   · Thick, white, cheese-like discharge from your vagina    · Itching, swelling, or redness in your vagina    · Burning when you urinate  Treatment for vulvovaginal candidiasis  includes medicines to treat the fungal infection and decrease inflammation  The medicine may be a pill, topical cream, or vaginal suppository  Manage your symptoms:   · Wear cotton underwear  · Keep the vaginal area clean and dry  · Do not have sex until your symptoms are gone  · Do not douche  · Do not use feminine hygiene sprays, powders, or bubble bath  Prevent another infection:   · Take showers instead of baths  · Eat yogurt  · Limit the amount of alcohol you drink  · Control your blood sugar if you are diabetic  · Limit your time in hot tubs  Follow up with your healthcare provider as directed:  Write down your questions so you remember to ask them during your visits  © 2017 [employer] Information is for End User's use only and may not be sold, redistributed or otherwise used for commercial purposes  All illustrations and images included in CareNotes® are the copyrighted property of [employer]  or [employer]  The above information is an  only   It is not intended as medical advice for individual conditions or treatments  Talk to your doctor, nurse or pharmacist before following any medical regimen to see if it is safe and effective for you

## 2020-02-24 NOTE — TELEPHONE ENCOUNTER
Rodney Oakley sent a rx to pharm stating BRAND ONLY,  If this is the case pharm needs a preauthorization,  pls call pharm at  [phone] to inform them of correct status,  Thanks

## 2020-02-25 PROBLEM — Z11.3 SCREENING FOR STDS (SEXUALLY TRANSMITTED DISEASES): Status: ACTIVE | Noted: 2020-02-25

## 2020-02-25 LAB
BV WHIFF TEST VAG QL: NEGATIVE
CLUE CELLS SPEC QL WET PREP: NEGATIVE
PH SMN: 4.5 [PH]
RPR SER QL: NORMAL
SL AMB POCT WET MOUNT: ABNORMAL
T VAGINALIS VAG QL WET PREP: NEGATIVE
YEAST VAG QL WET PREP: POSITIVE

## 2020-02-25 NOTE — PROGRESS NOTES
Assessment/Plan:    Vaginal discharge  Exam consistent with candidiasis  Recommended Diflucan, nystatin/triamcinolone, conservative vulvar hygiene, pelvic rest until sx fully resolved  Discussed common triggers and what to avoid  Reviewed sx to report and reasons to call  Screening for STD (sexually transmitted disease)  Desires STI testing  GC/CT obtained today and lab slip given for Hep B/C, HIV and RPR  Diagnoses and all orders for this visit:    Urinary frequency  -     Cancel: Urinalysis with microscopic  -     Cancel: Urine culture  -     POCT urine dip  -     Urinalysis with microscopic  -     Urine culture    Screening for STDs (sexually transmitted diseases)  -     Chlamydia/GC amplified DNA by PCR  -     Hepatitis B surface antigen; Future  -     Hepatitis C antibody; Future  -     HIV 1/2 AG-AB combo; Future  -     RPR; Future    Vaginal discharge  -     POCT wet mount  -     fluconazole (DIFLUCAN) 150 mg tablet; Take one tablet po today and repeat 3 days        Subjective:      Patient ID: Joyce Billingsley is a 27 y o  female  Sarwat Monroy is a 27year old female who presents today with complaints of vaginal itching, burning and discharge without an odor since Wednesday  She complains of urinary frequency without burning or pain She has not tried any OTC medications  She denies pelvic/abdominal pain  She is not sexually active but was sexually active two weeks ago and did not use condoms  Urine dip in office was negative  UA C&S sent  The following portions of the patient's history were reviewed and updated as appropriate: allergies, current medications, past family history, past medical history, past social history, past surgical history and problem list     Review of Systems   Constitutional: Negative  Gastrointestinal: Negative for abdominal pain  Endocrine: Negative  Genitourinary: Positive for frequency and vaginal discharge   Negative for difficulty urinating, dyspareunia, dysuria, flank pain, genital sores, menstrual problem, pelvic pain and vaginal pain  Neurological: Negative  Objective:      /84 (BP Location: Right arm, Patient Position: Sitting, Cuff Size: Standard)   Ht 5' 7" (1 702 m)   Wt 70 7 kg (155 lb 12 8 oz)   LMP 02/09/2020 (LMP Unknown)   BMI 24 40 kg/m²          Physical Exam   Constitutional: She is oriented to person, place, and time  She appears well-developed and well-nourished  HENT:   Head: Normocephalic  Eyes: Pupils are equal, round, and reactive to light  Abdominal: There is no tenderness  Genitourinary: Uterus normal  There is no rash, tenderness, lesion or injury on the right labia  There is no rash, tenderness, lesion or injury on the left labia  Cervix exhibits no motion tenderness and no discharge  Right adnexum displays no mass and no tenderness  Left adnexum displays no mass and no tenderness  No erythema, tenderness or bleeding in the vagina  Vaginal discharge found  Musculoskeletal: Normal range of motion  Neurological: She is alert and oriented to person, place, and time  Skin: Skin is warm and dry  Capillary refill takes less than 2 seconds  Psychiatric: She has a normal mood and affect       Wet prep + yeast, Ph 4 5, neg trich, clue or whiff

## 2020-02-26 LAB
BACTERIA UR CULT: ABNORMAL
BACTERIA UR CULT: ABNORMAL
HIV 1+2 AB+HIV1 P24 AG SERPL QL IA: NORMAL

## 2020-02-27 ENCOUNTER — TELEPHONE (OUTPATIENT)
Dept: OBGYN CLINIC | Facility: CLINIC | Age: 25
End: 2020-02-27

## 2020-02-27 DIAGNOSIS — N30.00 ACUTE CYSTITIS WITHOUT HEMATURIA: Primary | ICD-10-CM

## 2020-02-27 LAB
C TRACH DNA SPEC QL NAA+PROBE: NEGATIVE
N GONORRHOEA DNA SPEC QL NAA+PROBE: NEGATIVE

## 2020-02-27 RX ORDER — NITROFURANTOIN 25; 75 MG/1; MG/1
100 CAPSULE ORAL 2 TIMES DAILY
Qty: 10 CAPSULE | Refills: 0 | Status: SHIPPED | OUTPATIENT
Start: 2020-02-27 | End: 2020-03-03

## 2020-02-27 NOTE — TELEPHONE ENCOUNTER
Spoke with Pt today via phone call  Pt informed that her recent lab work result confirmed she has mild UTI per Daralyn Opitz' review of result  Pt further informed that Rx for Macrobid PO BID for  5 days was forwarded to Pt's pharmacy in EHR by Daralyn Opitz  Pt informed that her GC/CHT culture result was "negative" (normal)  Reiterated to Pt that if her UTI symptoms do not improve or worsen to call back office

## 2020-02-27 NOTE — TELEPHONE ENCOUNTER
Called Susana's cell and left message  (per [facility] comm consent)  Results of STI testing, including GC/CH are negative

## 2020-02-27 NOTE — TELEPHONE ENCOUNTER
She does have a mild UTI  Sent is rx for macrobid BID  Please let her know    Her GC/CT were negative

## 2020-04-02 ENCOUNTER — TELEPHONE (OUTPATIENT)
Dept: FAMILY MEDICINE CLINIC | Facility: MEDICAL CENTER | Age: 25
End: 2020-04-02

## 2020-04-03 ENCOUNTER — TELEMEDICINE (OUTPATIENT)
Dept: FAMILY MEDICINE CLINIC | Facility: MEDICAL CENTER | Age: 25
End: 2020-04-03
Payer: COMMERCIAL

## 2020-04-03 ENCOUNTER — TELEPHONE (OUTPATIENT)
Dept: FAMILY MEDICINE CLINIC | Facility: MEDICAL CENTER | Age: 25
End: 2020-04-03

## 2020-04-03 VITALS — TEMPERATURE: 101 F | WEIGHT: 155 LBS | HEIGHT: 67 IN | BODY MASS INDEX: 24.33 KG/M2

## 2020-04-03 DIAGNOSIS — J45.20 MILD INTERMITTENT ASTHMA WITHOUT COMPLICATION: ICD-10-CM

## 2020-04-03 DIAGNOSIS — Z20.822 EXPOSURE TO COVID-19 VIRUS: ICD-10-CM

## 2020-04-03 DIAGNOSIS — R50.9 FEVER, UNSPECIFIED FEVER CAUSE: Primary | ICD-10-CM

## 2020-04-03 PROBLEM — N92.1 METRORRHAGIA: Status: ACTIVE | Noted: 2020-04-03

## 2020-04-03 PROBLEM — N94.10 UNSPECIFIED DYSPAREUNIA: Status: ACTIVE | Noted: 2020-04-03

## 2020-04-03 PROBLEM — S03.2XXA: Status: ACTIVE | Noted: 2018-09-06

## 2020-04-03 PROBLEM — S06.0X0A CONCUSSION WITHOUT LOSS OF CONSCIOUSNESS: Status: ACTIVE | Noted: 2018-09-06

## 2020-04-03 PROBLEM — IMO0002 UNSPECIFIED DYSPAREUNIA: Status: ACTIVE | Noted: 2020-04-03

## 2020-04-03 PROBLEM — J30.2 SEASONAL ALLERGIES: Status: ACTIVE | Noted: 2020-04-03
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
## 2020-04-03 PROBLEM — S51.859A HUMAN BITE OF FOREARM, INITIAL ENCOUNTER: Status: ACTIVE | Noted: 2018-09-06

## 2020-04-03 PROBLEM — S02.85XA LEFT ORBIT FRACTURE (HCC): Status: ACTIVE | Noted: 2018-09-06

## 2020-04-03 PROCEDURE — 99213 OFFICE O/P EST LOW 20 MIN: CPT | Performed by: FAMILY MEDICINE

## 2020-04-03 RX ORDER — ALBUTEROL SULFATE 90 UG/1
2 AEROSOL, METERED RESPIRATORY (INHALATION) EVERY 6 HOURS PRN
Qty: 1 INHALER | Refills: 0 | Status: SHIPPED | OUTPATIENT
Start: 2020-04-03 | End: 2020-10-21

## 2020-04-06 ENCOUNTER — TELEMEDICINE (OUTPATIENT)
Dept: FAMILY MEDICINE CLINIC | Facility: MEDICAL CENTER | Age: 25
End: 2020-04-06
Payer: COMMERCIAL

## 2020-04-06 ENCOUNTER — TELEPHONE (OUTPATIENT)
Dept: FAMILY MEDICINE CLINIC | Facility: MEDICAL CENTER | Age: 25
End: 2020-04-06

## 2020-04-06 VITALS — WEIGHT: 155 LBS | BODY MASS INDEX: 24.33 KG/M2 | TEMPERATURE: 101 F | HEIGHT: 67 IN

## 2020-04-06 DIAGNOSIS — Z20.822 EXPOSURE TO COVID-19 VIRUS: ICD-10-CM

## 2020-04-06 DIAGNOSIS — R50.9 FEVER, UNSPECIFIED FEVER CAUSE: Primary | ICD-10-CM

## 2020-04-06 PROCEDURE — 99213 OFFICE O/P EST LOW 20 MIN: CPT | Performed by: FAMILY MEDICINE

## 2020-04-07 ENCOUNTER — TELEMEDICINE (OUTPATIENT)
Dept: FAMILY MEDICINE CLINIC | Facility: MEDICAL CENTER | Age: 25
End: 2020-04-07
Payer: COMMERCIAL

## 2020-04-07 VITALS — TEMPERATURE: 101 F

## 2020-04-07 DIAGNOSIS — R50.9 FEVER, UNSPECIFIED FEVER CAUSE: Primary | ICD-10-CM

## 2020-04-07 DIAGNOSIS — Z20.822 EXPOSURE TO COVID-19 VIRUS: ICD-10-CM

## 2020-04-07 PROCEDURE — 99213 OFFICE O/P EST LOW 20 MIN: CPT | Performed by: FAMILY MEDICINE

## 2020-04-09 ENCOUNTER — TELEMEDICINE (OUTPATIENT)
Dept: FAMILY MEDICINE CLINIC | Facility: MEDICAL CENTER | Age: 25
End: 2020-04-09
Payer: COMMERCIAL

## 2020-04-09 ENCOUNTER — TELEPHONE (OUTPATIENT)
Dept: FAMILY MEDICINE CLINIC | Facility: MEDICAL CENTER | Age: 25
End: 2020-04-09

## 2020-04-09 VITALS — TEMPERATURE: 101 F

## 2020-04-09 DIAGNOSIS — Z20.822 SUSPECTED COVID-19 VIRUS INFECTION: ICD-10-CM

## 2020-04-09 DIAGNOSIS — R50.9 FEVER, UNSPECIFIED FEVER CAUSE: Primary | ICD-10-CM

## 2020-04-09 PROCEDURE — G2012 BRIEF CHECK IN BY MD/QHP: HCPCS | Performed by: FAMILY MEDICINE

## 2020-04-09 RX ORDER — ACETAMINOPHEN 500 MG
1000 TABLET ORAL EVERY 6 HOURS PRN
COMMUNITY
End: 2021-06-14

## 2020-04-10 ENCOUNTER — TELEMEDICINE (OUTPATIENT)
Dept: FAMILY MEDICINE CLINIC | Facility: MEDICAL CENTER | Age: 25
End: 2020-04-10
Payer: COMMERCIAL

## 2020-04-10 ENCOUNTER — TELEPHONE (OUTPATIENT)
Dept: FAMILY MEDICINE CLINIC | Facility: MEDICAL CENTER | Age: 25
End: 2020-04-10

## 2020-04-10 VITALS — TEMPERATURE: 101 F

## 2020-04-10 DIAGNOSIS — Z20.822 SUSPECTED COVID-19 VIRUS INFECTION: Primary | ICD-10-CM

## 2020-04-10 PROCEDURE — 99213 OFFICE O/P EST LOW 20 MIN: CPT | Performed by: FAMILY MEDICINE

## 2020-04-13 ENCOUNTER — TELEMEDICINE (OUTPATIENT)
Dept: FAMILY MEDICINE CLINIC | Facility: MEDICAL CENTER | Age: 25
End: 2020-04-13
Payer: COMMERCIAL

## 2020-04-13 ENCOUNTER — TELEPHONE (OUTPATIENT)
Dept: FAMILY MEDICINE CLINIC | Facility: MEDICAL CENTER | Age: 25
End: 2020-04-13

## 2020-04-13 VITALS — TEMPERATURE: 101 F

## 2020-04-13 DIAGNOSIS — R50.9 FEVER, UNSPECIFIED FEVER CAUSE: ICD-10-CM

## 2020-04-13 DIAGNOSIS — Z20.822 SUSPECTED COVID-19 VIRUS INFECTION: Primary | ICD-10-CM

## 2020-04-13 PROCEDURE — 99213 OFFICE O/P EST LOW 20 MIN: CPT | Performed by: FAMILY MEDICINE

## 2020-04-13 RX ORDER — AZITHROMYCIN 250 MG/1
TABLET, FILM COATED ORAL
Qty: 6 TABLET | Refills: 0 | Status: SHIPPED | OUTPATIENT
Start: 2020-04-13 | End: 2020-04-17

## 2020-04-14 ENCOUNTER — TELEPHONE (OUTPATIENT)
Dept: FAMILY MEDICINE CLINIC | Facility: MEDICAL CENTER | Age: 25
End: 2020-04-14

## 2020-04-14 ENCOUNTER — TELEMEDICINE (OUTPATIENT)
Dept: FAMILY MEDICINE CLINIC | Facility: MEDICAL CENTER | Age: 25
End: 2020-04-14
Payer: COMMERCIAL

## 2020-04-14 VITALS — TEMPERATURE: 101 F

## 2020-04-14 DIAGNOSIS — R50.9 FEVER, UNSPECIFIED FEVER CAUSE: ICD-10-CM

## 2020-04-14 DIAGNOSIS — Z20.822 SUSPECTED COVID-19 VIRUS INFECTION: Primary | ICD-10-CM

## 2020-04-14 PROCEDURE — 99213 OFFICE O/P EST LOW 20 MIN: CPT | Performed by: FAMILY MEDICINE

## 2020-04-15 ENCOUNTER — TELEMEDICINE (OUTPATIENT)
Dept: FAMILY MEDICINE CLINIC | Facility: MEDICAL CENTER | Age: 25
End: 2020-04-15
Payer: COMMERCIAL

## 2020-04-15 ENCOUNTER — TELEPHONE (OUTPATIENT)
Dept: FAMILY MEDICINE CLINIC | Facility: MEDICAL CENTER | Age: 25
End: 2020-04-15

## 2020-04-15 VITALS — BODY MASS INDEX: 24.28 KG/M2 | TEMPERATURE: 98.5 F | WEIGHT: 155 LBS

## 2020-04-15 DIAGNOSIS — Z20.822 SUSPECTED COVID-19 VIRUS INFECTION: Primary | ICD-10-CM

## 2020-04-15 PROCEDURE — 99213 OFFICE O/P EST LOW 20 MIN: CPT | Performed by: FAMILY MEDICINE

## 2020-04-16 ENCOUNTER — TELEMEDICINE (OUTPATIENT)
Dept: FAMILY MEDICINE CLINIC | Facility: MEDICAL CENTER | Age: 25
End: 2020-04-16
Payer: COMMERCIAL

## 2020-04-16 ENCOUNTER — TELEPHONE (OUTPATIENT)
Dept: FAMILY MEDICINE CLINIC | Facility: MEDICAL CENTER | Age: 25
End: 2020-04-16

## 2020-04-16 VITALS — TEMPERATURE: 102 F

## 2020-04-16 DIAGNOSIS — Z20.822 SUSPECTED COVID-19 VIRUS INFECTION: Primary | ICD-10-CM

## 2020-04-16 PROCEDURE — 99213 OFFICE O/P EST LOW 20 MIN: CPT | Performed by: FAMILY MEDICINE

## 2020-04-17 ENCOUNTER — TELEPHONE (OUTPATIENT)
Dept: FAMILY MEDICINE CLINIC | Facility: MEDICAL CENTER | Age: 25
End: 2020-04-17

## 2020-04-17 ENCOUNTER — TELEMEDICINE (OUTPATIENT)
Dept: FAMILY MEDICINE CLINIC | Facility: MEDICAL CENTER | Age: 25
End: 2020-04-17
Payer: COMMERCIAL

## 2020-04-17 VITALS — TEMPERATURE: 100 F

## 2020-04-17 DIAGNOSIS — Z20.822 SUSPECTED COVID-19 VIRUS INFECTION: Primary | ICD-10-CM

## 2020-04-17 PROCEDURE — 99213 OFFICE O/P EST LOW 20 MIN: CPT | Performed by: FAMILY MEDICINE

## 2020-04-20 ENCOUNTER — TELEPHONE (OUTPATIENT)
Dept: FAMILY MEDICINE CLINIC | Facility: MEDICAL CENTER | Age: 25
End: 2020-04-20

## 2020-04-20 ENCOUNTER — TELEMEDICINE (OUTPATIENT)
Dept: FAMILY MEDICINE CLINIC | Facility: MEDICAL CENTER | Age: 25
End: 2020-04-20
Payer: COMMERCIAL

## 2020-04-20 VITALS — TEMPERATURE: 98.5 F

## 2020-04-20 DIAGNOSIS — Z20.822 SUSPECTED COVID-19 VIRUS INFECTION: Primary | ICD-10-CM

## 2020-04-20 PROCEDURE — 99213 OFFICE O/P EST LOW 20 MIN: CPT | Performed by: FAMILY MEDICINE

## 2020-04-21 ENCOUNTER — TELEMEDICINE (OUTPATIENT)
Dept: FAMILY MEDICINE CLINIC | Facility: MEDICAL CENTER | Age: 25
End: 2020-04-21
Payer: COMMERCIAL

## 2020-04-21 ENCOUNTER — TELEPHONE (OUTPATIENT)
Dept: FAMILY MEDICINE CLINIC | Facility: MEDICAL CENTER | Age: 25
End: 2020-04-21

## 2020-04-21 VITALS — TEMPERATURE: 98.5 F

## 2020-04-21 DIAGNOSIS — Z20.822 SUSPECTED COVID-19 VIRUS INFECTION: Primary | ICD-10-CM

## 2020-04-21 PROCEDURE — 99213 OFFICE O/P EST LOW 20 MIN: CPT | Performed by: FAMILY MEDICINE

## 2020-04-23 ENCOUNTER — TELEPHONE (OUTPATIENT)
Dept: FAMILY MEDICINE CLINIC | Facility: MEDICAL CENTER | Age: 25
End: 2020-04-23

## 2020-05-04 ENCOUNTER — TELEMEDICINE (OUTPATIENT)
Dept: FAMILY MEDICINE CLINIC | Facility: MEDICAL CENTER | Age: 25
End: 2020-05-04
Payer: COMMERCIAL

## 2020-05-04 ENCOUNTER — TELEPHONE (OUTPATIENT)
Dept: FAMILY MEDICINE CLINIC | Facility: MEDICAL CENTER | Age: 25
End: 2020-05-04

## 2020-05-04 VITALS — TEMPERATURE: 98.5 F

## 2020-05-04 DIAGNOSIS — Z20.822 SUSPECTED COVID-19 VIRUS INFECTION: Primary | ICD-10-CM

## 2020-05-04 PROCEDURE — 99213 OFFICE O/P EST LOW 20 MIN: CPT | Performed by: FAMILY MEDICINE

## 2020-05-26 ENCOUNTER — OFFICE VISIT (OUTPATIENT)
Dept: OBGYN CLINIC | Facility: MEDICAL CENTER | Age: 25
End: 2020-05-26
Payer: COMMERCIAL

## 2020-05-26 VITALS
WEIGHT: 147 LBS | TEMPERATURE: 98.9 F | SYSTOLIC BLOOD PRESSURE: 100 MMHG | DIASTOLIC BLOOD PRESSURE: 56 MMHG | BODY MASS INDEX: 23.02 KG/M2

## 2020-05-26 DIAGNOSIS — R30.0 BURNING WITH URINATION: ICD-10-CM

## 2020-05-26 DIAGNOSIS — N89.8 VAGINAL ITCHING: Primary | ICD-10-CM

## 2020-05-26 PROBLEM — Z97.5 IUD (INTRAUTERINE DEVICE) IN PLACE: Status: RESOLVED | Noted: 2018-07-18 | Resolved: 2020-05-26

## 2020-05-26 LAB
BACTERIA UR QL AUTO: ABNORMAL /HPF
BILIRUB UR QL STRIP: NEGATIVE
BV WHIFF TEST VAG QL: NEGATIVE
CLARITY UR: ABNORMAL
CLUE CELLS SPEC QL WET PREP: NEGATIVE
COLOR UR: ABNORMAL
GLUCOSE UR STRIP-MCNC: NEGATIVE MG/DL
HGB UR QL STRIP.AUTO: NEGATIVE
HYALINE CASTS #/AREA URNS LPF: ABNORMAL /LPF
KETONES UR STRIP-MCNC: NEGATIVE MG/DL
LEUKOCYTE ESTERASE UR QL STRIP: NEGATIVE
NITRITE UR QL STRIP: NEGATIVE
NON-SQ EPI CELLS URNS QL MICRO: ABNORMAL /HPF
PH SMN: 4.5 [PH]
PH UR STRIP.AUTO: 6 [PH]
PROT UR STRIP-MCNC: NEGATIVE MG/DL
RBC #/AREA URNS AUTO: ABNORMAL /HPF
SL AMB  POCT GLUCOSE, UA: ABNORMAL
SL AMB LEUKOCYTE ESTERASE,UA: ABNORMAL
SL AMB POCT BILIRUBIN,UA: ABNORMAL
SL AMB POCT BLOOD,UA: ABNORMAL
SL AMB POCT CLARITY,UA: CLEAR
SL AMB POCT COLOR,UA: YELLOW
SL AMB POCT KETONES,UA: ABNORMAL
SL AMB POCT NITRITE,UA: ABNORMAL
SL AMB POCT PH,UA: ABNORMAL
SL AMB POCT SPECIFIC GRAVITY,UA: 1.02
SL AMB POCT URINE PROTEIN: ABNORMAL
SL AMB POCT UROBILINOGEN: ABNORMAL
SL AMB POCT WET MOUNT: ABNORMAL
SP GR UR STRIP.AUTO: 1.03 (ref 1–1.03)
T VAGINALIS VAG QL WET PREP: NEGATIVE
UROBILINOGEN UR QL STRIP.AUTO: 0.2 E.U./DL
WBC #/AREA URNS AUTO: ABNORMAL /HPF
YEAST VAG QL WET PREP: POSITIVE

## 2020-05-26 PROCEDURE — 87086 URINE CULTURE/COLONY COUNT: CPT | Performed by: NURSE PRACTITIONER

## 2020-05-26 PROCEDURE — 81002 URINALYSIS NONAUTO W/O SCOPE: CPT | Performed by: NURSE PRACTITIONER

## 2020-05-26 PROCEDURE — 81001 URINALYSIS AUTO W/SCOPE: CPT | Performed by: NURSE PRACTITIONER

## 2020-05-26 PROCEDURE — 87210 SMEAR WET MOUNT SALINE/INK: CPT | Performed by: NURSE PRACTITIONER

## 2020-05-26 PROCEDURE — 99213 OFFICE O/P EST LOW 20 MIN: CPT | Performed by: NURSE PRACTITIONER

## 2020-05-26 PROCEDURE — 87147 CULTURE TYPE IMMUNOLOGIC: CPT | Performed by: NURSE PRACTITIONER

## 2020-05-26 RX ORDER — FLUCONAZOLE 150 MG/1
TABLET ORAL
Qty: 2 TABLET | Refills: 0 | Status: SHIPPED | OUTPATIENT
Start: 2020-05-26 | End: 2020-05-29

## 2020-05-27 ENCOUNTER — TELEPHONE (OUTPATIENT)
Dept: OBGYN CLINIC | Facility: CLINIC | Age: 25
End: 2020-05-27

## 2020-05-27 LAB
BACTERIA UR CULT: ABNORMAL
BACTERIA UR CULT: ABNORMAL

## 2020-05-28 ENCOUNTER — TELEPHONE (OUTPATIENT)
Dept: OBGYN CLINIC | Facility: CLINIC | Age: 25
End: 2020-05-28

## 2020-06-01 ENCOUNTER — TELEPHONE (OUTPATIENT)
Dept: OBGYN CLINIC | Facility: CLINIC | Age: 25
End: 2020-06-01

## 2020-06-01 DIAGNOSIS — Z20.2 EXPOSURE TO STD: Primary | ICD-10-CM

## 2020-06-03 ENCOUNTER — APPOINTMENT (OUTPATIENT)
Dept: LAB | Facility: MEDICAL CENTER | Age: 25
End: 2020-06-03
Payer: COMMERCIAL

## 2020-06-03 DIAGNOSIS — Z20.2 EXPOSURE TO STD: ICD-10-CM

## 2020-06-03 LAB
C TRACH DNA SPEC QL NAA+PROBE: NEGATIVE
HCV AB SER QL: NORMAL
N GONORRHOEA DNA SPEC QL NAA+PROBE: NEGATIVE

## 2020-06-03 PROCEDURE — 87591 N.GONORRHOEAE DNA AMP PROB: CPT

## 2020-06-03 PROCEDURE — 86592 SYPHILIS TEST NON-TREP QUAL: CPT

## 2020-06-03 PROCEDURE — 36415 COLL VENOUS BLD VENIPUNCTURE: CPT

## 2020-06-03 PROCEDURE — 87491 CHLMYD TRACH DNA AMP PROBE: CPT

## 2020-06-03 PROCEDURE — 87350 HEPATITIS BE AG IA: CPT

## 2020-06-03 PROCEDURE — 86803 HEPATITIS C AB TEST: CPT

## 2020-06-03 PROCEDURE — 87389 HIV-1 AG W/HIV-1&-2 AB AG IA: CPT

## 2020-06-04 LAB
HBV E AG SERPL QL IA: NEGATIVE
HIV 1+2 AB+HIV1 P24 AG SERPL QL IA: NORMAL
RPR SER QL: NORMAL

## 2020-10-21 ENCOUNTER — OFFICE VISIT (OUTPATIENT)
Dept: FAMILY MEDICINE CLINIC | Facility: MEDICAL CENTER | Age: 25
End: 2020-10-21
Payer: COMMERCIAL

## 2020-10-21 VITALS
WEIGHT: 151.4 LBS | DIASTOLIC BLOOD PRESSURE: 73 MMHG | TEMPERATURE: 99.2 F | SYSTOLIC BLOOD PRESSURE: 116 MMHG | BODY MASS INDEX: 23.71 KG/M2 | HEART RATE: 68 BPM

## 2020-10-21 DIAGNOSIS — Z00.00 PHYSICAL EXAM, ANNUAL: ICD-10-CM

## 2020-10-21 DIAGNOSIS — Z23 NEED FOR IMMUNIZATION AGAINST INFLUENZA: ICD-10-CM

## 2020-10-21 DIAGNOSIS — Z13.1 SCREENING FOR DIABETES MELLITUS: ICD-10-CM

## 2020-10-21 DIAGNOSIS — Z23 NEED FOR PNEUMOCOCCAL VACCINE: ICD-10-CM

## 2020-10-21 DIAGNOSIS — Z91.030 BEE STING ALLERGY: ICD-10-CM

## 2020-10-21 DIAGNOSIS — Z13.220 SCREENING FOR LIPID DISORDERS: ICD-10-CM

## 2020-10-21 DIAGNOSIS — J45.20 MILD INTERMITTENT ASTHMA WITHOUT COMPLICATION: Primary | ICD-10-CM

## 2020-10-21 DIAGNOSIS — Z13.29 SCREENING FOR THYROID DISORDER: ICD-10-CM

## 2020-10-21 DIAGNOSIS — R50.9 FEBRILE ILLNESS: ICD-10-CM

## 2020-10-21 DIAGNOSIS — J30.2 SEASONAL ALLERGIES: ICD-10-CM

## 2020-10-21 PROBLEM — R30.0 BURNING WITH URINATION: Status: RESOLVED | Noted: 2020-05-26 | Resolved: 2020-10-21

## 2020-10-21 PROBLEM — S51.859A HUMAN BITE OF FOREARM, INITIAL ENCOUNTER: Status: RESOLVED | Noted: 2018-09-06 | Resolved: 2020-10-21
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
## 2020-10-21 PROBLEM — S06.0X0A CONCUSSION WITHOUT LOSS OF CONSCIOUSNESS: Status: RESOLVED | Noted: 2018-09-06 | Resolved: 2020-10-21

## 2020-10-21 PROBLEM — IMO0002 UNSPECIFIED DYSPAREUNIA: Status: RESOLVED | Noted: 2020-04-03 | Resolved: 2020-10-21

## 2020-10-21 PROBLEM — S03.2XXA: Status: RESOLVED | Noted: 2018-09-06 | Resolved: 2020-10-21

## 2020-10-21 PROBLEM — B37.31 CANDIDIASIS OF VULVA AND VAGINA: Status: RESOLVED | Noted: 2019-07-12 | Resolved: 2020-10-21

## 2020-10-21 PROBLEM — Z30.432 ENCOUNTER FOR IUD REMOVAL: Status: RESOLVED | Noted: 2020-01-15 | Resolved: 2020-10-21

## 2020-10-21 PROBLEM — Z86.19 HISTORY OF CHLAMYDIA: Status: RESOLVED | Noted: 2018-05-25 | Resolved: 2020-10-21

## 2020-10-21 PROBLEM — Z12.4 CERVICAL CANCER SCREENING: Status: RESOLVED | Noted: 2019-05-23 | Resolved: 2020-10-21

## 2020-10-21 PROBLEM — T74.91XA DOMESTIC VIOLENCE OF ADULT: Status: RESOLVED | Noted: 2018-10-15 | Resolved: 2020-10-21

## 2020-10-21 PROBLEM — N89.8 VAGINAL ITCHING: Status: RESOLVED | Noted: 2020-05-26 | Resolved: 2020-10-21

## 2020-10-21 PROBLEM — N89.8 VAGINAL DISCHARGE: Status: RESOLVED | Noted: 2020-02-24 | Resolved: 2020-10-21

## 2020-10-21 PROBLEM — B37.3 CANDIDIASIS OF VULVA AND VAGINA: Status: RESOLVED | Noted: 2019-07-12 | Resolved: 2020-10-21

## 2020-10-21 PROBLEM — B96.89 BV (BACTERIAL VAGINOSIS): Status: RESOLVED | Noted: 2018-11-07 | Resolved: 2020-10-21

## 2020-10-21 PROBLEM — S02.85XA LEFT ORBIT FRACTURE (HCC): Status: RESOLVED | Noted: 2018-09-06 | Resolved: 2020-10-21

## 2020-10-21 PROBLEM — Z11.3 SCREENING FOR STDS (SEXUALLY TRANSMITTED DISEASES): Status: RESOLVED | Noted: 2020-02-25 | Resolved: 2020-10-21

## 2020-10-21 PROBLEM — L70.8 OTHER ACNE: Status: RESOLVED | Noted: 2018-04-26 | Resolved: 2020-10-21

## 2020-10-21 PROBLEM — N94.10 UNSPECIFIED DYSPAREUNIA: Status: RESOLVED | Noted: 2020-04-03 | Resolved: 2020-10-21

## 2020-10-21 PROBLEM — N76.0 BV (BACTERIAL VAGINOSIS): Status: RESOLVED | Noted: 2018-11-07 | Resolved: 2020-10-21

## 2020-10-21 PROBLEM — N90.89 VULVAR LESION: Status: RESOLVED | Noted: 2019-07-12 | Resolved: 2020-10-21

## 2020-10-21 PROBLEM — Z11.3 SCREENING FOR STD (SEXUALLY TRANSMITTED DISEASE): Status: RESOLVED | Noted: 2019-01-08 | Resolved: 2020-10-21

## 2020-10-21 PROBLEM — N92.1 METRORRHAGIA: Status: RESOLVED | Noted: 2020-04-03 | Resolved: 2020-10-21

## 2020-10-21 PROCEDURE — 99214 OFFICE O/P EST MOD 30 MIN: CPT | Performed by: FAMILY MEDICINE

## 2020-10-21 PROCEDURE — 99395 PREV VISIT EST AGE 18-39: CPT | Performed by: FAMILY MEDICINE

## 2020-10-21 PROCEDURE — 90472 IMMUNIZATION ADMIN EACH ADD: CPT | Performed by: FAMILY MEDICINE

## 2020-10-21 PROCEDURE — 90732 PPSV23 VACC 2 YRS+ SUBQ/IM: CPT | Performed by: FAMILY MEDICINE

## 2020-10-21 PROCEDURE — 90471 IMMUNIZATION ADMIN: CPT | Performed by: FAMILY MEDICINE

## 2020-10-21 PROCEDURE — 90682 RIV4 VACC RECOMBINANT DNA IM: CPT | Performed by: FAMILY MEDICINE

## 2020-10-21 RX ORDER — ALBUTEROL SULFATE 90 UG/1
2 POWDER, METERED RESPIRATORY (INHALATION) AS NEEDED
Qty: 1 EACH | Refills: 0 | Status: SHIPPED | OUTPATIENT
Start: 2020-10-21

## 2020-10-21 RX ORDER — EPINEPHRINE 0.3 MG/.3ML
0.3 INJECTION SUBCUTANEOUS AS NEEDED
Qty: 2 EACH | Refills: 0 | Status: SHIPPED | OUTPATIENT
Start: 2020-10-21 | End: 2021-06-15

## 2020-10-21 RX ORDER — FLUTICASONE PROPIONATE 50 MCG
2 SPRAY, SUSPENSION (ML) NASAL DAILY
Qty: 1 BOTTLE | Refills: 5 | Status: SHIPPED | OUTPATIENT
Start: 2020-10-21 | End: 2021-11-01 | Stop reason: SDUPTHER

## 2020-10-21 RX ORDER — SERTRALINE HYDROCHLORIDE 25 MG/1
TABLET, FILM COATED ORAL
Qty: 135 TABLET | Refills: 1 | Status: SHIPPED | OUTPATIENT
Start: 2020-10-21 | End: 2021-04-17

## 2020-11-13 ENCOUNTER — TELEPHONE (OUTPATIENT)
Dept: OBGYN CLINIC | Facility: CLINIC | Age: 25
End: 2020-11-13

## 2020-11-13 DIAGNOSIS — B37.3 YEAST VAGINITIS: Primary | ICD-10-CM

## 2020-11-13 RX ORDER — FLUCONAZOLE 150 MG/1
150 TABLET ORAL ONCE
Qty: 1 TABLET | Refills: 0 | Status: SHIPPED | OUTPATIENT
Start: 2020-11-13 | End: 2020-11-13

## 2020-12-28 ENCOUNTER — LAB (OUTPATIENT)
Dept: LAB | Facility: MEDICAL CENTER | Age: 25
End: 2020-12-28
Payer: COMMERCIAL

## 2020-12-28 DIAGNOSIS — Z13.220 SCREENING FOR LIPID DISORDERS: ICD-10-CM

## 2020-12-28 DIAGNOSIS — Z13.1 SCREENING FOR DIABETES MELLITUS: ICD-10-CM

## 2020-12-28 DIAGNOSIS — Z13.29 SCREENING FOR THYROID DISORDER: ICD-10-CM

## 2020-12-28 DIAGNOSIS — R50.9 FEBRILE ILLNESS: ICD-10-CM

## 2020-12-28 LAB
ALBUMIN SERPL BCP-MCNC: 3.7 G/DL (ref 3.5–5)
ALP SERPL-CCNC: 53 U/L (ref 46–116)
ALT SERPL W P-5'-P-CCNC: 20 U/L (ref 12–78)
ANION GAP SERPL CALCULATED.3IONS-SCNC: 3 MMOL/L (ref 4–13)
AST SERPL W P-5'-P-CCNC: 17 U/L (ref 5–45)
BILIRUB SERPL-MCNC: 0.53 MG/DL (ref 0.2–1)
BUN SERPL-MCNC: 9 MG/DL (ref 5–25)
CALCIUM SERPL-MCNC: 9.3 MG/DL (ref 8.3–10.1)
CHLORIDE SERPL-SCNC: 109 MMOL/L (ref 100–108)
CHOLEST SERPL-MCNC: 157 MG/DL (ref 50–200)
CO2 SERPL-SCNC: 26 MMOL/L (ref 21–32)
CREAT SERPL-MCNC: 0.79 MG/DL (ref 0.6–1.3)
EST. AVERAGE GLUCOSE BLD GHB EST-MCNC: 108 MG/DL
GFR SERPL CREATININE-BSD FRML MDRD: 122 ML/MIN/1.73SQ M
GLUCOSE P FAST SERPL-MCNC: 78 MG/DL (ref 65–99)
HBA1C MFR BLD: 5.4 %
HDLC SERPL-MCNC: 56 MG/DL
LDLC SERPL CALC-MCNC: 90 MG/DL (ref 0–100)
LDLC SERPL DIRECT ASSAY-MCNC: 84 MG/DL (ref 0–100)
NONHDLC SERPL-MCNC: 101 MG/DL
POTASSIUM SERPL-SCNC: 4 MMOL/L (ref 3.5–5.3)
PROT SERPL-MCNC: 7.4 G/DL (ref 6.4–8.2)
SODIUM SERPL-SCNC: 138 MMOL/L (ref 136–145)
T4 FREE SERPL-MCNC: 0.79 NG/DL (ref 0.76–1.46)
TRIGL SERPL-MCNC: 53 MG/DL
TSH SERPL DL<=0.05 MIU/L-ACNC: 0.51 UIU/ML (ref 0.36–3.74)

## 2020-12-28 PROCEDURE — 83721 ASSAY OF BLOOD LIPOPROTEIN: CPT

## 2020-12-28 PROCEDURE — 80053 COMPREHEN METABOLIC PANEL: CPT

## 2020-12-28 PROCEDURE — 84443 ASSAY THYROID STIM HORMONE: CPT

## 2020-12-28 PROCEDURE — 36415 COLL VENOUS BLD VENIPUNCTURE: CPT

## 2020-12-28 PROCEDURE — 86769 SARS-COV-2 COVID-19 ANTIBODY: CPT

## 2020-12-28 PROCEDURE — 80061 LIPID PANEL: CPT

## 2020-12-28 PROCEDURE — 84439 ASSAY OF FREE THYROXINE: CPT

## 2020-12-28 PROCEDURE — 83036 HEMOGLOBIN GLYCOSYLATED A1C: CPT

## 2020-12-29 ENCOUNTER — TELEPHONE (OUTPATIENT)
Dept: FAMILY MEDICINE CLINIC | Facility: MEDICAL CENTER | Age: 25
End: 2020-12-29

## 2020-12-29 LAB — SARS-COV-2 IGG+IGM SERPL QL IA: NORMAL

## 2021-01-12 ENCOUNTER — OFFICE VISIT (OUTPATIENT)
Dept: OBGYN CLINIC | Facility: MEDICAL CENTER | Age: 26
End: 2021-01-12
Payer: COMMERCIAL

## 2021-01-12 VITALS — BODY MASS INDEX: 23.9 KG/M2 | SYSTOLIC BLOOD PRESSURE: 108 MMHG | DIASTOLIC BLOOD PRESSURE: 76 MMHG | WEIGHT: 152.6 LBS

## 2021-01-12 DIAGNOSIS — Z11.3 SCREENING FOR STDS (SEXUALLY TRANSMITTED DISEASES): Primary | ICD-10-CM

## 2021-01-12 DIAGNOSIS — Z32.02 NEGATIVE PREGNANCY TEST: ICD-10-CM

## 2021-01-12 LAB — SL AMB POCT URINE HCG: NORMAL

## 2021-01-12 PROCEDURE — 87591 N.GONORRHOEAE DNA AMP PROB: CPT | Performed by: NURSE PRACTITIONER

## 2021-01-12 PROCEDURE — 87491 CHLMYD TRACH DNA AMP PROBE: CPT | Performed by: NURSE PRACTITIONER

## 2021-01-12 PROCEDURE — 99213 OFFICE O/P EST LOW 20 MIN: CPT | Performed by: NURSE PRACTITIONER

## 2021-01-12 PROCEDURE — 81025 URINE PREGNANCY TEST: CPT | Performed by: NURSE PRACTITIONER

## 2021-01-12 NOTE — ASSESSMENT & PLAN NOTE
GC/CT from urine  Lab slip given for HIV, RPR, Hep B/C  Last IC was 3 weeks ago  Denies symptoms-just wants screening
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
## 2021-01-17 LAB
C TRACH DNA SPEC QL NAA+PROBE: NEGATIVE
N GONORRHOEA DNA SPEC QL NAA+PROBE: NEGATIVE

## 2021-01-21 ENCOUNTER — TELEPHONE (OUTPATIENT)
Dept: OBGYN CLINIC | Facility: CLINIC | Age: 26
End: 2021-01-21

## 2021-02-24 ENCOUNTER — TELEPHONE (OUTPATIENT)
Dept: FAMILY MEDICINE CLINIC | Facility: MEDICAL CENTER | Age: 26
End: 2021-02-24

## 2021-02-24 NOTE — TELEPHONE ENCOUNTER
Pt requested that you call her, I asked what it was regarding and she said, "she needed to speak with you"  Aware you are out of the office Wednesday

## 2021-02-25 NOTE — TELEPHONE ENCOUNTER
S/w Mom  Aware  Discussed with Maryam,office  manager , offered copies of her son's records regarding ER visit in 2018   Emailed to patient
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
## 2021-03-16 ENCOUNTER — OFFICE VISIT (OUTPATIENT)
Dept: FAMILY MEDICINE CLINIC | Facility: MEDICAL CENTER | Age: 26
End: 2021-03-16
Payer: COMMERCIAL

## 2021-03-16 ENCOUNTER — TELEPHONE (OUTPATIENT)
Dept: NEUROLOGY | Facility: CLINIC | Age: 26
End: 2021-03-16

## 2021-03-16 ENCOUNTER — APPOINTMENT (OUTPATIENT)
Dept: LAB | Facility: MEDICAL CENTER | Age: 26
End: 2021-03-16
Payer: COMMERCIAL

## 2021-03-16 VITALS
TEMPERATURE: 97.2 F | BODY MASS INDEX: 24.17 KG/M2 | SYSTOLIC BLOOD PRESSURE: 112 MMHG | HEART RATE: 60 BPM | HEIGHT: 67 IN | DIASTOLIC BLOOD PRESSURE: 70 MMHG | WEIGHT: 154 LBS

## 2021-03-16 DIAGNOSIS — G51.0 BELL'S PALSY: ICD-10-CM

## 2021-03-16 DIAGNOSIS — J30.2 SEASONAL ALLERGIES: ICD-10-CM

## 2021-03-16 DIAGNOSIS — J45.20 MILD INTERMITTENT ASTHMA WITHOUT COMPLICATION: Primary | ICD-10-CM

## 2021-03-16 DIAGNOSIS — Z11.3 SCREENING FOR STDS (SEXUALLY TRANSMITTED DISEASES): ICD-10-CM

## 2021-03-16 LAB
HBV SURFACE AG SER QL: NORMAL
HCV AB SER QL: NORMAL
RPR SER QL: NORMAL

## 2021-03-16 PROCEDURE — 86592 SYPHILIS TEST NON-TREP QUAL: CPT

## 2021-03-16 PROCEDURE — 87389 HIV-1 AG W/HIV-1&-2 AB AG IA: CPT

## 2021-03-16 PROCEDURE — 87340 HEPATITIS B SURFACE AG IA: CPT

## 2021-03-16 PROCEDURE — 99214 OFFICE O/P EST MOD 30 MIN: CPT | Performed by: FAMILY MEDICINE

## 2021-03-16 PROCEDURE — 86617 LYME DISEASE ANTIBODY: CPT

## 2021-03-16 PROCEDURE — 86803 HEPATITIS C AB TEST: CPT

## 2021-03-16 PROCEDURE — 36415 COLL VENOUS BLD VENIPUNCTURE: CPT

## 2021-03-16 PROCEDURE — 86618 LYME DISEASE ANTIBODY: CPT

## 2021-03-16 RX ORDER — VALACYCLOVIR HYDROCHLORIDE 1 G/1
TABLET, FILM COATED ORAL
COMMUNITY
Start: 2021-03-13 | End: 2021-06-14

## 2021-03-16 RX ORDER — PREDNISONE 20 MG/1
TABLET ORAL
COMMUNITY
Start: 2021-03-13 | End: 2021-06-14
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
## 2021-03-17 DIAGNOSIS — Z30.09 GENERAL COUNSELING AND ADVICE FOR CONTRACEPTIVE MANAGEMENT: ICD-10-CM

## 2021-03-17 LAB
B BURGDOR IGG+IGM SER-ACNC: 198
HIV 1+2 AB+HIV1 P24 AG SERPL QL IA: NORMAL

## 2021-03-17 RX ORDER — ETONOGESTREL AND ETHINYL ESTRADIOL 11.7; 2.7 MG/1; MG/1
INSERT, EXTENDED RELEASE VAGINAL
Qty: 3 EACH | Refills: 0 | Status: SHIPPED | OUTPATIENT
Start: 2021-03-17 | End: 2021-06-14

## 2021-03-17 NOTE — TELEPHONE ENCOUNTER
Looks like she has been seen for problem visits but is overdue for annual  rx sent for 3 rings   Please contact her to schedule annual and additional refills will be provided at that time

## 2021-03-18 ENCOUNTER — TELEPHONE (OUTPATIENT)
Dept: FAMILY MEDICINE CLINIC | Facility: MEDICAL CENTER | Age: 26
End: 2021-03-18

## 2021-03-18 LAB

## 2021-03-18 NOTE — TELEPHONE ENCOUNTER
Pt saw you on Tuesday  She has been taking the abx  She reports that she is not feeling any better and may be feeling worse  She said her face is still tingling and she said her left side of check "feels stuck"  No other symptoms  Please, advise patient

## 2021-03-18 NOTE — TELEPHONE ENCOUNTER
Unfortunately there is not much I can do  Patient has Bell's palsy  It will take time for symptoms to resolve  She was given the appropriate medication from the emergency room in [place]  I am still awaiting the results of her Lyme testing

## 2021-03-22 ENCOUNTER — TELEPHONE (OUTPATIENT)
Dept: OBGYN CLINIC | Facility: MEDICAL CENTER | Age: 26
End: 2021-03-22

## 2021-03-22 ENCOUNTER — OFFICE VISIT (OUTPATIENT)
Dept: URGENT CARE | Facility: MEDICAL CENTER | Age: 26
End: 2021-03-22
Payer: COMMERCIAL

## 2021-03-22 VITALS
DIASTOLIC BLOOD PRESSURE: 68 MMHG | TEMPERATURE: 98 F | OXYGEN SATURATION: 99 % | BODY MASS INDEX: 24.17 KG/M2 | HEART RATE: 75 BPM | SYSTOLIC BLOOD PRESSURE: 125 MMHG | HEIGHT: 67 IN | RESPIRATION RATE: 18 BRPM | WEIGHT: 154 LBS

## 2021-03-22 DIAGNOSIS — G51.0 BELL'S PALSY: Primary | ICD-10-CM

## 2021-03-22 PROCEDURE — G0382 LEV 3 HOSP TYPE B ED VISIT: HCPCS | Performed by: PHYSICIAN ASSISTANT

## 2021-03-22 PROCEDURE — 99283 EMERGENCY DEPT VISIT LOW MDM: CPT | Performed by: PHYSICIAN ASSISTANT

## 2021-03-22 PROCEDURE — 99203 OFFICE O/P NEW LOW 30 MIN: CPT | Performed by: PHYSICIAN ASSISTANT
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
## 2021-03-22 NOTE — TELEPHONE ENCOUNTER
Pt states she went to UC who advised her it will resolve with more time and to continue current treatment

## 2021-03-24 ENCOUNTER — TELEPHONE (OUTPATIENT)
Dept: OBGYN CLINIC | Facility: CLINIC | Age: 26
End: 2021-03-24

## 2021-03-24 DIAGNOSIS — N76.0 ACUTE VAGINITIS: Primary | ICD-10-CM

## 2021-03-24 RX ORDER — FLUCONAZOLE 150 MG/1
TABLET ORAL
Qty: 2 TABLET | Refills: 0 | Status: SHIPPED | OUTPATIENT
Start: 2021-03-24 | End: 2021-03-27

## 2021-03-24 NOTE — TELEPHONE ENCOUNTER
Spoke to pt and she was recently on abx for her [place] palsy  C/o white d/c and itching  Confirmed pharmacy

## 2021-03-24 NOTE — TELEPHONE ENCOUNTER
Pt was on valtrex and prednisone for bells palsy  Pt sx white d/c and itching  Pt denies irregular bleeding, no odor, no v/b,no changes in bowels or voiding  Pt states a little pelvic pain very mild centered  Please advise pharmacy on file

## 2021-03-24 NOTE — TELEPHONE ENCOUNTER
Ok sounds good  I will eRx diflucan   Please advise conservative vulvar skin care and pelvic rest for about 1 week  F/u PRN if sx do not improve

## 2021-03-24 NOTE — TELEPHONE ENCOUNTER
She would have been on a steroid or antiviral med, not an abx for this indication  Does she have any risk factors for STI?

## 2021-03-24 NOTE — TELEPHONE ENCOUNTER
Pt contacted and informed as directed, pt agreed to plan of action and was grateful for the information/call

## 2021-04-17 RX ORDER — SERTRALINE HYDROCHLORIDE 25 MG/1
TABLET, FILM COATED ORAL
Qty: 135 TABLET | Refills: 1 | Status: SHIPPED | OUTPATIENT
Start: 2021-04-17 | End: 2021-10-14

## 2021-04-30 ENCOUNTER — ULTRASOUND (OUTPATIENT)
Dept: OBGYN CLINIC | Facility: MEDICAL CENTER | Age: 26
End: 2021-04-30
Payer: COMMERCIAL

## 2021-04-30 VITALS — DIASTOLIC BLOOD PRESSURE: 70 MMHG | WEIGHT: 153.6 LBS | SYSTOLIC BLOOD PRESSURE: 104 MMHG | BODY MASS INDEX: 24.06 KG/M2

## 2021-04-30 DIAGNOSIS — N91.2 AMENORRHEA: Primary | ICD-10-CM

## 2021-04-30 PROCEDURE — 76817 TRANSVAGINAL US OBSTETRIC: CPT | Performed by: STUDENT IN AN ORGANIZED HEALTH CARE EDUCATION/TRAINING PROGRAM

## 2021-04-30 PROCEDURE — 99213 OFFICE O/P EST LOW 20 MIN: CPT | Performed by: STUDENT IN AN ORGANIZED HEALTH CARE EDUCATION/TRAINING PROGRAM

## 2021-04-30 NOTE — PROGRESS NOTES
Assessment/Plan:      23 y o  Orion Right at  [address] with LICO of 12/3/21 by LMP consistent w/ US today  Heart tones WNL  Pt to follow up for OB intake  She is not taking any teratogenic medications  Nausea/vomiting is moderate  Counseled to start prenatal vitamin - will try pill rather than gummy as gummy makes her nauseated  Subjective:      Patient ID:      23 y o  rOion Right w/ LMP of 21 with ega of 9 wks and LICO of 12/3/21 presents for viability US  She has moderate nausea, no bleeding/cramping  Normal cycles  Not planned    H/o  x 1      Review of Systems   Constitutional: Negative  Respiratory: Negative  Genitourinary: Negative  Psychiatric/Behavioral: Negative  Objective:      Physical Exam   Constitutional: She appears well-nourished  Cardiovascular: Normal rate  Pulmonary/Chest: Effort normal           Transvaginal US shows a live fetus with a heart beat of 162  Crown-rump length measures 2 55 cm, consistent with 9 2/7 wga  A yolk sac is visible within the gestational sac  We will keep the patient's due date of 12/3/21 based on her LMP

## 2021-05-04 ENCOUNTER — TELEPHONE (OUTPATIENT)
Dept: FAMILY MEDICINE CLINIC | Facility: MEDICAL CENTER | Age: 26
End: 2021-05-04

## 2021-05-04 NOTE — TELEPHONE ENCOUNTER
Pt's last px was Oct, 2020 and she now needs a work px w/2 step PPD  Pt will get us a copy of the px form and get it over to us to see if you can fill out the form based on her Oct, 2020 px or if she needs another appt

## 2021-05-05 ENCOUNTER — CLINICAL SUPPORT (OUTPATIENT)
Dept: FAMILY MEDICINE CLINIC | Facility: MEDICAL CENTER | Age: 26
End: 2021-05-05
Payer: COMMERCIAL

## 2021-05-05 DIAGNOSIS — Z11.1 SCREENING-PULMONARY TB: Primary | ICD-10-CM

## 2021-05-05 DIAGNOSIS — Z11.1 ENCOUNTER FOR PPD TEST: ICD-10-CM

## 2021-05-05 PROCEDURE — 86580 TB INTRADERMAL TEST: CPT

## 2021-05-05 NOTE — TELEPHONE ENCOUNTER
Patient came for 1st PPD placement   Form is on your desk  Will be back in Friday for the read  Needs a 2 step

## 2021-05-07 ENCOUNTER — TELEMEDICINE (OUTPATIENT)
Dept: OBGYN CLINIC | Facility: CLINIC | Age: 26
End: 2021-05-07
Payer: COMMERCIAL

## 2021-05-07 ENCOUNTER — CLINICAL SUPPORT (OUTPATIENT)
Dept: FAMILY MEDICINE CLINIC | Facility: MEDICAL CENTER | Age: 26
End: 2021-05-07

## 2021-05-07 DIAGNOSIS — Z11.1 SCREENING FOR TUBERCULOSIS: Primary | ICD-10-CM

## 2021-05-07 DIAGNOSIS — Z34.81 PRENATAL CARE, SUBSEQUENT PREGNANCY, FIRST TRIMESTER: Primary | ICD-10-CM

## 2021-05-07 LAB
INDURATION: 0 MM
TB SKIN TEST: NEGATIVE

## 2021-05-07 PROCEDURE — T1001 NURSING ASSESSMENT/EVALUATN: HCPCS | Performed by: STUDENT IN AN ORGANIZED HEALTH CARE EDUCATION/TRAINING PROGRAM

## 2021-05-07 RX ORDER — PNV NO.95/FERROUS FUM/FOLIC AC 28MG-0.8MG
1 TABLET ORAL DAILY
COMMUNITY

## 2021-05-07 NOTE — PROGRESS NOTES
OB INTAKE INTERVIEW  Patient is 23y o y o  year old who presents for OB intake at 10-2 wks  She is accompanied by: phone interview  The father of her baby is not in a relationship with pt , but pt states" will be supportive of baby "      Last Menstrual Period: 2021  Ultrasound: Measured 9 weeks 2 days on 2021  Estimated Date of Delivery: 12/3/2021 via LMP    Signs/Symptoms of Pregnancy  Current pregnancy symptoms: none  Constipation no  Headaches no  Cramping/spotting no  PICA cravings no    Diabetes-   If patient has 1 or more, please order early 1 hour GTT  History of GDM no  BMI >35 no  History of PCOS or current metformin use no  History of LGA/macrosomic infant (4000g/9lbs) no    If patient has 2 or more, please order early 1 hour GTT  BMI>30 no  AMA no  First degree relative with type 2 diabetes no  History of chronic HTN, hyperlipidemia, elevated A1C no  High risk race (, , ,  or ) no    Hypertension- if you answer yes, please order preeclampsia labs (comprehensive metabolic panel, urine protein creatinine ratio, 24 hour urine)  History of of chronic HTN no  History of gestational HTN no  History of preeclampsia, eclampsia, or HELLP syndrome no  History of diabetes no  History of lupus, autoimmune disease, kidney disease no    Thyroid- if yes order TSH with reflex T4  History of thyroid disease no    Bleeding Disorder or Hx of DVT-patient or first degree relative with history of  Order the following if not done previously     (Factor V, antithrombin III, prothrombin gene mutation, protein C and S Ag, lupus anticoagulant, anticardiolipin, beta-2 glycoprotein)   no    OB/GYN-  History of abnormal pap smear no  History of HPV no  History of Herpes/HSV no  History of other STI (gonorrhea, chlamydia, trich) YES  History of prior  YES  History of prior  no  History of  delivery prior to 36 weeks 6 days no  History of blood transfusion no  Ok for blood transfusion yes    Substance screening- if yes outside of tobacco for her or anyone in her home-order urine drug screen  History of tobacco use no  Currently using tobacco no  Currently using alcohol no  Presently using drugs no  Past drug use  no  IV drug use-If yes add Hep C antibody to labs no  Partner drug use no  Parent/Family drug use no    MRSA Screening-   Does the pt have a hx of MRSA? no  If yes- please follow MRSA protocol and obtain a nasal swab for MRSA culture    Immunizations:  Influenza vaccine given this season no  Discussed Tdap vaccine yes  Discussed COVID Vaccine yes- pt is not interested  Pt was + for covid 1/28/2021    Genetic/MFM-  Do you or your partner have a history of any of the following in yourselves or first degree relatives? Cystic fibrosis no  Spinal muscular atrophy no  Hemoglobinopathy/Sickle Cell/Thalassemia no  Fragile X Intellectual Disability no    If yes, discuss carrier screening and recommend consultation with MF/genetic counseling  If no, discuss option for carrier screening and/or genetic testing with Nuchal Ultrasound  Patient interested yes  Appointment at [facility] made no  Pt to call today  Interview education  [facility] Pregnancy Essentials Book reviewed and discussed yes    Nurse/Family Partnership- patient may qualify no; referral placed no    Prenatal lab work scripts   Extra labs ordered:  no    The patient has a history now or in prior pregnancy notable for:  Hx of Quimby Palsy 4/2021 -recovered without side effects  Pt has a hx of one prior vag delivery 2018  Hx of PP depression/depression   - takes zoloft daily  Had Covid 1/28/2021  Is not interested in receiving vaccines  Details that I feel the provider should be aware of: FOB not involved with pt, but pt states he will support baby  PN1 visit scheduled  The patient was oriented to our practice, reviewed delivering physicians and [address] [place] [address] for Delivery   All questions were answered  PN phone interview completed  Pn evan ordered- encouraged pt to complete prior to PN1 visit  Pn folder mailed to pt  Referral entered for [facility]- nuchal translucency, genetic testing, level 2  20 wks US  Pt to call today for an appt  Advised to call with any questions/concerns       Interviewed by: Kris Nguyen RN, [address] [place] [address]

## 2021-05-14 ENCOUNTER — APPOINTMENT (OUTPATIENT)
Dept: LAB | Facility: MEDICAL CENTER | Age: 26
End: 2021-05-14
Payer: COMMERCIAL

## 2021-05-14 ENCOUNTER — CLINICAL SUPPORT (OUTPATIENT)
Dept: FAMILY MEDICINE CLINIC | Facility: MEDICAL CENTER | Age: 26
End: 2021-05-14
Payer: COMMERCIAL

## 2021-05-14 DIAGNOSIS — Z34.81 PRENATAL CARE, SUBSEQUENT PREGNANCY, FIRST TRIMESTER: ICD-10-CM

## 2021-05-14 DIAGNOSIS — Z11.1 PPD SCREENING TEST: Primary | ICD-10-CM

## 2021-05-14 LAB
ABO GROUP BLD: NORMAL
BACTERIA UR QL AUTO: NORMAL /HPF
BASOPHILS # BLD AUTO: 0.04 THOUSANDS/ΜL (ref 0–0.1)
BASOPHILS NFR BLD AUTO: 1 % (ref 0–1)
BILIRUB UR QL STRIP: NEGATIVE
BLD GP AB SCN SERPL QL: NEGATIVE
CLARITY UR: CLEAR
COLOR UR: YELLOW
EOSINOPHIL # BLD AUTO: 0.1 THOUSAND/ΜL (ref 0–0.61)
EOSINOPHIL NFR BLD AUTO: 2 % (ref 0–6)
ERYTHROCYTE [DISTWIDTH] IN BLOOD BY AUTOMATED COUNT: 17.7 % (ref 11.6–15.1)
GLUCOSE UR STRIP-MCNC: NEGATIVE MG/DL
HBV SURFACE AG SER QL: NORMAL
HCT VFR BLD AUTO: 37.8 % (ref 34.8–46.1)
HGB BLD-MCNC: 12.4 G/DL (ref 11.5–15.4)
HGB UR QL STRIP.AUTO: NEGATIVE
IMM GRANULOCYTES # BLD AUTO: 0.01 THOUSAND/UL (ref 0–0.2)
IMM GRANULOCYTES NFR BLD AUTO: 0 % (ref 0–2)
KETONES UR STRIP-MCNC: NEGATIVE MG/DL
LEUKOCYTE ESTERASE UR QL STRIP: NEGATIVE
LYMPHOCYTES # BLD AUTO: 2.13 THOUSANDS/ΜL (ref 0.6–4.47)
LYMPHOCYTES NFR BLD AUTO: 33 % (ref 14–44)
MCH RBC QN AUTO: 27.3 PG (ref 26.8–34.3)
MCHC RBC AUTO-ENTMCNC: 32.8 G/DL (ref 31.4–37.4)
MCV RBC AUTO: 83 FL (ref 82–98)
MONOCYTES # BLD AUTO: 0.59 THOUSAND/ΜL (ref 0.17–1.22)
MONOCYTES NFR BLD AUTO: 9 % (ref 4–12)
NEUTROPHILS # BLD AUTO: 3.51 THOUSANDS/ΜL (ref 1.85–7.62)
NEUTS SEG NFR BLD AUTO: 55 % (ref 43–75)
NITRITE UR QL STRIP: NEGATIVE
NON-SQ EPI CELLS URNS QL MICRO: NORMAL /HPF
NRBC BLD AUTO-RTO: 0 /100 WBCS
PH UR STRIP.AUTO: 7 [PH]
PLATELET # BLD AUTO: 235 THOUSANDS/UL (ref 149–390)
PMV BLD AUTO: 10.8 FL (ref 8.9–12.7)
PROT UR STRIP-MCNC: NEGATIVE MG/DL
RBC # BLD AUTO: 4.55 MILLION/UL (ref 3.81–5.12)
RBC #/AREA URNS AUTO: NORMAL /HPF
RH BLD: POSITIVE
RUBV IGG SERPL IA-ACNC: 92.2 IU/ML
SP GR UR STRIP.AUTO: 1.03 (ref 1–1.03)
UROBILINOGEN UR QL STRIP.AUTO: 0.2 E.U./DL
WBC # BLD AUTO: 6.38 THOUSAND/UL (ref 4.31–10.16)
WBC #/AREA URNS AUTO: NORMAL /HPF

## 2021-05-14 PROCEDURE — 81001 URINALYSIS AUTO W/SCOPE: CPT

## 2021-05-14 PROCEDURE — 36415 COLL VENOUS BLD VENIPUNCTURE: CPT

## 2021-05-14 PROCEDURE — 80081 OBSTETRIC PANEL INC HIV TSTG: CPT

## 2021-05-14 PROCEDURE — 86580 TB INTRADERMAL TEST: CPT

## 2021-05-14 PROCEDURE — 87086 URINE CULTURE/COLONY COUNT: CPT

## 2021-05-15 LAB — BACTERIA UR CULT: NORMAL

## 2021-05-16 LAB — HIV 1+2 AB+HIV1 P24 AG SERPL QL IA: NORMAL

## 2021-05-17 ENCOUNTER — CLINICAL SUPPORT (OUTPATIENT)
Dept: FAMILY MEDICINE CLINIC | Facility: MEDICAL CENTER | Age: 26
End: 2021-05-17

## 2021-05-17 DIAGNOSIS — Z11.1 ENCOUNTER FOR PPD SKIN TEST READING: Primary | ICD-10-CM

## 2021-05-17 LAB
INDURATION: 0 MM
RPR SER QL: NORMAL
TB SKIN TEST: NEGATIVE

## 2021-05-21 ENCOUNTER — INITIAL PRENATAL (OUTPATIENT)
Dept: OBGYN CLINIC | Facility: MEDICAL CENTER | Age: 26
End: 2021-05-21
Payer: COMMERCIAL

## 2021-05-21 VITALS — BODY MASS INDEX: 24.12 KG/M2 | DIASTOLIC BLOOD PRESSURE: 74 MMHG | WEIGHT: 154 LBS | SYSTOLIC BLOOD PRESSURE: 118 MMHG

## 2021-05-21 DIAGNOSIS — Z34.81 PRENATAL CARE, SUBSEQUENT PREGNANCY, FIRST TRIMESTER: Primary | ICD-10-CM

## 2021-05-21 LAB
SL AMB  POCT GLUCOSE, UA: NORMAL
SL AMB POCT URINE PROTEIN: NORMAL

## 2021-05-21 PROCEDURE — 87591 N.GONORRHOEAE DNA AMP PROB: CPT | Performed by: STUDENT IN AN ORGANIZED HEALTH CARE EDUCATION/TRAINING PROGRAM

## 2021-05-21 PROCEDURE — 87491 CHLMYD TRACH DNA AMP PROBE: CPT | Performed by: STUDENT IN AN ORGANIZED HEALTH CARE EDUCATION/TRAINING PROGRAM

## 2021-05-21 PROCEDURE — 99213 OFFICE O/P EST LOW 20 MIN: CPT | Performed by: STUDENT IN AN ORGANIZED HEALTH CARE EDUCATION/TRAINING PROGRAM

## 2021-05-21 NOTE — PROGRESS NOTES
Pt presents for routine prenatal visit   Denies any bleeding, cramping, LOF   12 week labs done   [facility] folder given

## 2021-05-21 NOTE — PROGRESS NOTES
24 yo  at 12+0 here for new ob  She is having daily nausea and vomiting but is keeping some food and drink down  Not trying anything but is hoping is just goes away and wants to wait and see         PMHx:   Asthma rare albuteral  Depression stable on zoloft    Surgical Hx: None    ObHx:  Prior term  no PPH, SD, gHTN, GDM    GYN Hx  Prior CT, GCCT sent today off urine  No other issues    Exam wnl      NV of pregnancy: reviewed ten, b6, unisom  PNV to continue  Genetics visit   Return in 4 wks

## 2021-05-22 LAB
C TRACH DNA SPEC QL NAA+PROBE: NEGATIVE
N GONORRHOEA DNA SPEC QL NAA+PROBE: NEGATIVE

## 2021-05-26 ENCOUNTER — TELEPHONE (OUTPATIENT)
Dept: OBGYN CLINIC | Facility: CLINIC | Age: 26
End: 2021-05-26

## 2021-05-26 NOTE — TELEPHONE ENCOUNTER
Pt is 12 and 5,   She did not have intercourse in past 2 nights   Woke up this AM with dark spotting  It has persisted till now  Changed panty liner once  Slight cramps  What to do? Observe? Thanks  Angeline Partida  [birth year]  Per [address], pt chose to have an ov and observe  Pt not distressed at all about this   I also told her - heavy bleeding - to ED

## 2021-05-26 NOTE — TELEPHONE ENCOUNTER
Pt is currently 15wk pregnant  Pt states that she started spotting today red blood when she uses the bathroom and on panty liner   Pt states that she had an appointment on Friday no exam was performed also no recent intercourse please advise

## 2021-05-27 ENCOUNTER — ROUTINE PRENATAL (OUTPATIENT)
Dept: OBGYN CLINIC | Facility: MEDICAL CENTER | Age: 26
End: 2021-05-27
Payer: COMMERCIAL

## 2021-05-27 VITALS
BODY MASS INDEX: 24.33 KG/M2 | HEIGHT: 67 IN | DIASTOLIC BLOOD PRESSURE: 60 MMHG | SYSTOLIC BLOOD PRESSURE: 100 MMHG | WEIGHT: 155 LBS

## 2021-05-27 DIAGNOSIS — Z34.81 ENCOUNTER FOR SUPERVISION OF NORMAL PREGNANCY IN MULTIGRAVIDA IN FIRST TRIMESTER: Primary | ICD-10-CM

## 2021-05-27 LAB
SL AMB  POCT GLUCOSE, UA: NEGATIVE
SL AMB POCT URINE PROTEIN: ABNORMAL

## 2021-05-27 PROCEDURE — 99213 OFFICE O/P EST LOW 20 MIN: CPT | Performed by: OBSTETRICS & GYNECOLOGY

## 2021-05-27 NOTE — PROGRESS NOTES
Encounter for supervision of normal pregnancy in multigravida in first trimester  Frederick Chavarria presents today after having light vaginal spotting when she wiped with toilet paper, this started on Tuesday night  Denies heavy lifting, unusual activity or intercourse  Spotting contiued through Wednesday, she wore a panty liner and changed x 2 yesterday, today upon waking there was a small amount of pink tinge when wiping, since then no further bleeding  No signs of UTI,no frequency, pain or burning  FHT's via doppler 155's   Speculum exam with no significant findings, no bleeding noted, white thick vaginal discharge noted  Routine prenatal labs reviewed, no abnormalities noted  No hx of bleeding disorders per patient     Advised to continue to monitor for bleeding, to call the office with any bleeding even if over the weekend  Understands that any bleeding in the first 20 weeks could indicate a threatened miscarriage  Agrees with plan and is thankful for the information  Requests a note for work indicating not to work on [employer] units in the [address] [facility] that she works     Advised no heavy lifting at this time as well, she denies needing to lift at work

## 2021-05-27 NOTE — ASSESSMENT & PLAN NOTE
Jolie Graf presents today after having light vaginal spotting when she wiped with toilet paper, this started on Tuesday night  Denies heavy lifting, unusual activity or intercourse  Spotting contiued through Wednesday, she wore a panty liner and changed x 2 yesterday, today upon waking there was a small amount of pink tinge when wiping, since then no further bleeding  No signs of UTI,no frequency, pain or burning  FHT's via doppler 155's   Speculum exam with no significant findings, no bleeding noted, white thick vaginal discharge noted  Routine prenatal labs reviewed, no abnormalities noted  No hx of bleeding disorders per patient     Advised to continue to monitor for bleeding, to call the office with any bleeding even if over the weekend  Understands that any bleeding in the first 20 weeks could indicate a threatened miscarriage  Agrees with plan and is thankful for the information  Requests a note for work indicating not to work on [employer] units in the [facility] that she works     Advised no heavy lifting at this time as well, she denies needing to lift at work

## 2021-05-27 NOTE — PROGRESS NOTES
Encounter for supervision of normal pregnancy in multigravida in first trimester  PN1 was with Dr Ktahy Palafox on 5/21/2021    Farhat Fitting presents today after having light vaginal spotting when she wiped with toilet paper, this started on Tuesday night  Denies heavy lifting, unusual activity or intercourse  Spotting contiued through Wednesday, she wore a panty liner and changed x 2 yesterday, today upon waking there was a small amount of pink tinge when wiping, since then no further bleeding  No signs of UTI,no frequency, pain or burning  FHT's via doppler 155's   Speculum exam with no significant findings, no bleeding noted, white thick vaginal discharge noted  Advised to continue to monitor for bleeding, to call the office with any bleeding even if over the weekend  Understands that any bleeding in the first 20 weeks could indicate a threatened miscarriage  Agrees with plan and is thankful for the information  Requests a note for work indicating not to work on [employer] units in the [address] [facility] that she works     Advised no heavy lifting at this time as well, she denies needing to lift at work

## 2021-05-27 NOTE — LETTER
May 27, 2021     Patient: Teetee Noe   YOB: 1995   Date of Visit: 5/27/2021       To Whom it May Concern:    Genesis Wray is under my professional care  She was seen in my office on 5/27/2021  She may return to work on 5/29/2021  Please consider not placing her on COVID designated floors/unit at this time due to increased risk of severe disease complications in pregnancy   If you have any questions or concerns, please don't hesitate to call           Sincerely,          Yamilet De Jesus, [address] [facility]        CC: No Recipients

## 2021-05-27 NOTE — PATIENT INSTRUCTIONS
Patient understands that bleeding in the first 20 weeks may indicate a threatened miscarriage but is aware that today FHT's are Normal and there is no vaginal bleeding, abdominal or back pain present  Agrees to monitor for further bleeding and is aware to call with any further bleeding episodes even if occurring over the weekend  Has appointment with Maternal Fetal Medicine on 2021      Threatened Miscarriage   WHAT YOU NEED TO KNOW:   What is a threatened miscarriage? A threatened miscarriage occurs when you have vaginal bleeding within the first 20 weeks of pregnancy  It means that a miscarriage may happen  A threatened miscarriage may also be called a threatened   What causes bleeding or spotting during pregnancy? The cause of your bleeding or spotting may not be known  The following are possible causes of vaginal bleeding during pregnancy:  · Polyps, fibroids, or cysts in the uterus    · Sexual intercourse    · Infection    · Where or how the placenta is attached to your uterus (womb)    · A problem with your fetus (unborn baby)    · Drug or alcohol use    · Ectopic pregnancy (the fetus is growing outside of the uterus)    What are the signs and symptoms of a threatened miscarriage? · Vaginal spotting or bleeding    · Pain or cramping in your abdomen or lower back    How is a threatened miscarriage diagnosed? Tell your healthcare provider when your bleeding started  You may need any of the following:  · Blood tests  may show infection, check your level of pregnancy hormone, or give information about your overall health  · A pelvic exam  checks the size of your uterus  A pelvic exam also checks your cervix for dilation (opening)  · A pelvic ultrasound  shows pictures of the fetus and finds his heartbeat  A pelvic ultrasound also looks at your reproductive organs and monitors the amount of bleeding  How is a threatened miscarriage managed?   The following may help you manage your symptoms and decrease your risk for a miscarriage:  · Do not put anything in your vagina  Do not have sex, douche, or use tampons  These actions may increase your risk for infection and miscarriage  · Rest as directed  Do not exercise or do strenuous activities  These activities may cause  labor or miscarriage  Ask your healthcare provider what activities are okay to do  When should I seek immediate care? · You feel weak or faint  · Your pain or cramping in your abdomen or back gets worse  · You have vaginal bleeding that soaks 1 or more pads in an hour  · You pass material that looks like tissue or large clots  When should I contact my healthcare provider? · You have a fever  · You have trouble urinating, burning when you urinate, or feel a need to urinate often  · You have new or worsening vaginal bleeding  · You have vaginal pain or itching, or vaginal discharge that is yellow, green, or foul-smelling  · You have questions or concerns about your condition or care  CARE AGREEMENT:   You have the right to help plan your care  Learn about your health condition and how it may be treated  Discuss treatment options with your healthcare providers to decide what care you want to receive  You always have the right to refuse treatment  The above information is an  only  It is not intended as medical advice for individual conditions or treatments  Talk to your doctor, nurse or pharmacist before following any medical regimen to see if it is safe and effective for you  ©  [employer]  Information is for End User's use only and may not be sold, redistributed or otherwise used for commercial purposes   All illustrations and images included in CareNotes® are the copyrighted property of [employer]  or [facility] encourages vaccination to prevent from developing a severe COVID virus infection and the resultant maternal and fetal complications that can arise with a severe infection  Receiving the vaccine supplies antibodies directly to your baby through the placenta and through breastfeeding  We discussed that SARS-CoV-2  (COVID) vaccination is an option for  eligible pregnant and lactating women  The following information is from [facility]:    The [employer] and Moderna mRNA  vaccines have not been tested in pregnant   women and breastfeeding women yet  None of the vaccines have live virus   and do not contain ingredients that are known to be harmful to pregnant   women or to the fetus  Any risks to the fetus are thought to be low and   theoretical   Some risks of the vaccine including injection site   reactions, fatigue, headache, myalgias, chills, and fever; fever can be   treated with acetaminophen  Allergic reactions have been reported to be   rare  The Selestino Boot (Geryl Lown) Adenovector  vaccine has been associated   with a rare complication in women at < 48years of age  Until more data is   available [facility] is not recommending the use of this vaccine in pregnancy  ACOG and SMFM recommends that pregnant women have access to a vaccine and   that the vaccine not be withheld from pregnant or lactating women  For a COVID-19 Vaccine in Pregnancy Decision Aid, go to   https://Vantage[employer] org/COVIDvacPregnancy/      The ABM (Academy of Breastfeeding Medicine) Statement on Considerations   for COVID-19 Vaccination in Lactation is available at:   http://lucein-monreal com/   -in-lactation  Finally, the CDC statement on Vaccination Consideration for People who are   Pregnant or Breastfeeding is available at:   [employer] is   y html

## 2021-06-01 ENCOUNTER — TELEPHONE (OUTPATIENT)
Dept: PERINATAL CARE | Facility: OTHER | Age: 26
End: 2021-06-01

## 2021-06-01 NOTE — TELEPHONE ENCOUNTER
Pt arrived late for appt with a toddler - unable to reschedule NT on or before 6/3/21  Explained to pt we can schedule virtual appt with Genetic Counselor  Scheduled 6/4/21 with Ursula METCALF per pt

## 2021-06-03 ENCOUNTER — TELEPHONE (OUTPATIENT)
Dept: PERINATAL CARE | Facility: OTHER | Age: 26
End: 2021-06-03

## 2021-06-03 NOTE — TELEPHONE ENCOUNTER
Called patient to confirm Maternal Fetal Medicine virtual appt scheduled for 6/4/21  2:30  SAUD DUBOSE  Confirmed with patient that she will receive a prompt, by her preference of email  Explained procedure for virtual visit and requested she be ready to log into appointment approximately 10 minutes prior to scheduled start time  Reminder the [facility] Provider will send her the link to join virtual appt near the scheduled appointment time  Also confirmed with pt current email info and current insurance information  Patient instructed to call [facility] office @ #[phone] for technical support issues or any questions regarding the procedure for virtual appt       LEFT VOICEMAIL FOR PT WITH INFORMATION ABOVE

## 2021-06-09 ENCOUNTER — TELEPHONE (OUTPATIENT)
Dept: OBGYN CLINIC | Facility: CLINIC | Age: 26
End: 2021-06-09

## 2021-06-09 DIAGNOSIS — O21.9 NAUSEA AND VOMITING IN PREGNANCY: Primary | ICD-10-CM

## 2021-06-09 RX ORDER — ONDANSETRON 4 MG/1
4 TABLET, ORALLY DISINTEGRATING ORAL EVERY 6 HOURS PRN
Qty: 20 TABLET | Refills: 0 | Status: SHIPPED | OUTPATIENT
Start: 2021-06-09 | End: 2021-07-17 | Stop reason: SDUPTHER

## 2021-06-09 NOTE — PROGRESS NOTES
Zofran prescribed to patient as requested for N&V   Vit B6 and Unisom as well as ten are not helping

## 2021-06-09 NOTE — TELEPHONE ENCOUNTER
Pt says dr had given her options on some meds to take that has not worked wondering what her other options could be

## 2021-06-09 NOTE — TELEPHONE ENCOUNTER
Spoke to pt and she was last seen by Timmy Phan on 5/27  She has tried the b6/unisom and ten  She is vomiting 2x a day  Is keeping foods and fluids down though  Saw in chart pt had called [facility] on 6/1 to set up a new OB appt and she had not been seen since 4/30/21  Clarified with pt she is staying at our practice or going elsewhere  She said they had no openings so staying here at [facility]   Will route to Cheri to see if she would like to order any anti nausea meds

## 2021-06-14 ENCOUNTER — TELEPHONE (OUTPATIENT)
Dept: PERINATAL CARE | Facility: CLINIC | Age: 26
End: 2021-06-14

## 2021-06-14 ENCOUNTER — ROUTINE PRENATAL (OUTPATIENT)
Dept: OBGYN CLINIC | Facility: MEDICAL CENTER | Age: 26
End: 2021-06-14
Payer: COMMERCIAL

## 2021-06-14 ENCOUNTER — HOSPITAL ENCOUNTER (EMERGENCY)
Facility: HOSPITAL | Age: 26
Discharge: HOME | End: 2021-06-14
Attending: EMERGENCY MEDICINE | Admitting: EMERGENCY MEDICINE
Payer: COMMERCIAL

## 2021-06-14 VITALS
SYSTOLIC BLOOD PRESSURE: 136 MMHG | TEMPERATURE: 98.1 F | DIASTOLIC BLOOD PRESSURE: 89 MMHG | RESPIRATION RATE: 16 BRPM | OXYGEN SATURATION: 100 % | HEART RATE: 74 BPM

## 2021-06-14 VITALS — DIASTOLIC BLOOD PRESSURE: 64 MMHG | BODY MASS INDEX: 24.4 KG/M2 | WEIGHT: 155.8 LBS | SYSTOLIC BLOOD PRESSURE: 112 MMHG

## 2021-06-14 DIAGNOSIS — O46.92 VAGINAL BLEEDING IN PREGNANCY, SECOND TRIMESTER: ICD-10-CM

## 2021-06-14 DIAGNOSIS — Z34.82 ENCOUNTER FOR SUPERVISION OF OTHER NORMAL PREGNANCY IN SECOND TRIMESTER: ICD-10-CM

## 2021-06-14 DIAGNOSIS — O20.0 THREATENED MISCARRIAGE: Primary | ICD-10-CM

## 2021-06-14 DIAGNOSIS — R10.2 PELVIC PAIN: ICD-10-CM

## 2021-06-14 DIAGNOSIS — O46.92 SECOND TRIMESTER BLEEDING: Primary | ICD-10-CM

## 2021-06-14 DIAGNOSIS — Z91.030 BEE STING ALLERGY: ICD-10-CM

## 2021-06-14 LAB
ABO GROUP BLD: NORMAL
ALBUMIN SERPL BCP-MCNC: 3.2 G/DL (ref 3.5–5)
ALP SERPL-CCNC: 42 U/L (ref 46–116)
ALT SERPL W P-5'-P-CCNC: 18 U/L (ref 12–78)
ANION GAP SERPL CALCULATED.3IONS-SCNC: 10 MMOL/L (ref 4–13)
AST SERPL W P-5'-P-CCNC: 16 U/L (ref 5–45)
B-HCG SERPL-ACNC: 6992 MIU/ML
BASOPHILS # BLD AUTO: 0.04 THOUSANDS/ΜL (ref 0–0.1)
BASOPHILS NFR BLD AUTO: 1 % (ref 0–1)
BILIRUB SERPL-MCNC: 0.18 MG/DL (ref 0.2–1)
BUN SERPL-MCNC: 6 MG/DL (ref 5–25)
BV WHIFF TEST VAG QL: NEGATIVE
CALCIUM ALBUM COR SERPL-MCNC: 9.3 MG/DL (ref 8.3–10.1)
CALCIUM SERPL-MCNC: 8.7 MG/DL (ref 8.3–10.1)
CHLORIDE SERPL-SCNC: 103 MMOL/L (ref 100–108)
CLUE CELLS SPEC QL WET PREP: NEGATIVE
CO2 SERPL-SCNC: 25 MMOL/L (ref 21–32)
CREAT SERPL-MCNC: 0.63 MG/DL (ref 0.6–1.3)
EOSINOPHIL # BLD AUTO: 0.13 THOUSAND/ΜL (ref 0–0.61)
EOSINOPHIL NFR BLD AUTO: 2 % (ref 0–6)
ERYTHROCYTE [DISTWIDTH] IN BLOOD BY AUTOMATED COUNT: 17.2 % (ref 11.6–15.1)
GFR SERPL CREATININE-BSD FRML MDRD: 146 ML/MIN/1.73SQ M
GLUCOSE SERPL-MCNC: 87 MG/DL (ref 65–140)
HCT VFR BLD AUTO: 34.6 % (ref 34.8–46.1)
HGB BLD-MCNC: 11.4 G/DL (ref 11.5–15.4)
IMM GRANULOCYTES # BLD AUTO: 0.03 THOUSAND/UL (ref 0–0.2)
IMM GRANULOCYTES NFR BLD AUTO: 0 % (ref 0–2)
LYMPHOCYTES # BLD AUTO: 1.95 THOUSANDS/ΜL (ref 0.6–4.47)
LYMPHOCYTES NFR BLD AUTO: 26 % (ref 14–44)
MCH RBC QN AUTO: 27.6 PG (ref 26.8–34.3)
MCHC RBC AUTO-ENTMCNC: 32.9 G/DL (ref 31.4–37.4)
MCV RBC AUTO: 84 FL (ref 82–98)
MONOCYTES # BLD AUTO: 0.58 THOUSAND/ΜL (ref 0.17–1.22)
MONOCYTES NFR BLD AUTO: 8 % (ref 4–12)
NEUTROPHILS # BLD AUTO: 4.79 THOUSANDS/ΜL (ref 1.85–7.62)
NEUTS SEG NFR BLD AUTO: 63 % (ref 43–75)
NRBC BLD AUTO-RTO: 0 /100 WBCS
PH SMN: 4 [PH]
PLATELET # BLD AUTO: 215 THOUSANDS/UL (ref 149–390)
PMV BLD AUTO: 10.3 FL (ref 8.9–12.7)
POTASSIUM SERPL-SCNC: 3.8 MMOL/L (ref 3.5–5.3)
PROT SERPL-MCNC: 6.7 G/DL (ref 6.4–8.2)
RBC # BLD AUTO: 4.13 MILLION/UL (ref 3.81–5.12)
RH BLD: POSITIVE
SL AMB  POCT GLUCOSE, UA: NORMAL
SL AMB POCT URINE PROTEIN: NORMAL
SL AMB POCT WET MOUNT: ABNORMAL
SODIUM SERPL-SCNC: 138 MMOL/L (ref 136–145)
T VAGINALIS VAG QL WET PREP: NEGATIVE
WBC # BLD AUTO: 7.52 THOUSAND/UL (ref 4.31–10.16)
YEAST VAG QL WET PREP: POSITIVE

## 2021-06-14 PROCEDURE — 85025 COMPLETE CBC W/AUTO DIFF WBC: CPT | Performed by: EMERGENCY MEDICINE

## 2021-06-14 PROCEDURE — 99284 EMERGENCY DEPT VISIT MOD MDM: CPT | Performed by: EMERGENCY MEDICINE

## 2021-06-14 PROCEDURE — 99213 OFFICE O/P EST LOW 20 MIN: CPT | Performed by: NURSE PRACTITIONER

## 2021-06-14 PROCEDURE — 86901 BLOOD TYPING SEROLOGIC RH(D): CPT | Performed by: EMERGENCY MEDICINE

## 2021-06-14 PROCEDURE — 87210 SMEAR WET MOUNT SALINE/INK: CPT | Performed by: NURSE PRACTITIONER

## 2021-06-14 PROCEDURE — 80053 COMPREHEN METABOLIC PANEL: CPT | Performed by: EMERGENCY MEDICINE

## 2021-06-14 PROCEDURE — 76815 OB US LIMITED FETUS(S): CPT | Performed by: EMERGENCY MEDICINE

## 2021-06-14 PROCEDURE — 86900 BLOOD TYPING SEROLOGIC ABO: CPT | Performed by: EMERGENCY MEDICINE

## 2021-06-14 PROCEDURE — 36415 COLL VENOUS BLD VENIPUNCTURE: CPT | Performed by: EMERGENCY MEDICINE

## 2021-06-14 PROCEDURE — 99284 EMERGENCY DEPT VISIT MOD MDM: CPT

## 2021-06-14 PROCEDURE — 84702 CHORIONIC GONADOTROPIN TEST: CPT | Performed by: EMERGENCY MEDICINE

## 2021-06-14 NOTE — PROGRESS NOTES
Patient here for problem visit  She is 15w/3days  She states she has been spotting and cramping since Saturday; she also has itching which started Wednesday  She was given Fluconazole which hasn't helped; she states the itching is worse  Urine neg for protein and glucose

## 2021-06-14 NOTE — PROGRESS NOTES
Encounter for supervision of normal pregnancy in multigravida in first trimester    Honey Batista  is a 23 y o  Letta Dubin @15w3d who presents for problem prenatal visit  Was in ED today for bleeding and pelvic pain  She has been spotting and cramping since Saturday  Had bedside U/S that showed + IUP  She took Diflucan for yeast without relief  Vaginitis panel sent as she is bleeding today  She missed her early U/S with [facility]  Referral placed for consult with U/S  No fetal movement yet  Denies leakage of fluid  Reviewed reasons to call  Second trimester bleeding  No bleeding noted during speculum exam   Cx thick and closed  Wet prep shows + yeast with negative trich, clue and whiff  Was treated with one diflucan in ED on 6/12  Advised OTC Monistat for same  Vaginitis panel sent  Consult to [facility] given and advised to call ASAP to schedule  Pelvic pain  Intermittent RLQ pain  8/10  Denies UTI symptoms  Symptoms c/w round ligament pain  Will call if acute    Referral placed to [facility] for U/S
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
## 2021-06-14 NOTE — ASSESSMENT & PLAN NOTE
Intermittent RLQ pain  8/10  Denies UTI symptoms  Symptoms c/w round ligament pain  Will call if acute    Referral placed to [facility] for U/S

## 2021-06-14 NOTE — PATIENT INSTRUCTIONS
Pregnancy at 15 to 18 Weeks   [address]:   What changes are happening in my body? Now that you are in your second trimester, you have more energy  You may also feel hungrier than usual  You may start to experience other symptoms, such as heartburn or dizziness  You may be gaining about ½ to 1 pound a week, and your pregnancy is beginning to show  You may need to start wearing maternity clothes  How do I care for myself at this stage of my pregnancy? · Manage heartburn  by eating 4 or 5 small meals each day instead of large meals  Avoid spicy foods  Avoid eating right before bedtime  · Manage nausea and vomiting  Avoid fatty and spicy foods  Eat small meals throughout the day instead of large meals  Brittany may help to decrease nausea  Ask your healthcare provider about other ways of decreasing nausea and vomiting  · Eat a variety of healthy foods  Healthy foods include fruits, vegetables, whole-grain breads, low-fat dairy foods, beans, lean meats, and fish  Drink liquids as directed  Ask how much liquid to drink each day and which liquids are best for you  Limit caffeine to less than 200 milligrams each day  Limit your intake of fish to 2 servings each week  Choose fish low in mercury such as canned light tuna, shrimp, salmon, cod, or tilapia  Do not  eat fish high in mercury such as swordfish, tilefish, chayo mackerel, and shark  · Take prenatal vitamins as directed  Your need for certain vitamins and minerals, such as folic acid, increases during pregnancy  Prenatal vitamins provide some of the extra vitamins and minerals you need  Prenatal vitamins may also help to decrease the risk of certain birth defects  · Do not smoke  Smoking increases your risk of a miscarriage and other health problems during your pregnancy  Smoking can cause your baby to be born too early or weigh less at birth  Ask your healthcare provider for information if you need help quitting      · Do not drink alcohol  Alcohol passes from your body to your baby through the placenta  It can affect your baby's brain development and cause fetal alcohol syndrome (FAS)  FAS is a group of conditions that causes mental, behavior, and growth problems  · Talk to your healthcare provider before you take any medicines  Many medicines may harm your baby if you take them when you are pregnant  Do not take any medicines, vitamins, herbs, or supplements without first talking to your healthcare provider  Never use illegal or street drugs (such as marijuana or cocaine) while you are pregnant  What are some safety tips during pregnancy? · Avoid hot tubs and saunas  Do not use a hot tub or sauna while you are pregnant, especially during your first trimester  Hot tubs and saunas may raise your baby's temperature and increase the risk of birth defects  · Avoid toxoplasmosis  This is an infection caused by eating raw meat or being around infected cat feces  It can cause birth defects, miscarriages, and other problems  Wash your hands after you touch raw meat  Make sure any meat is well-cooked before you eat it  Avoid raw eggs and unpasteurized milk  Use gloves or ask someone else to clean your cat's litter box while you are pregnant  What changes are happening with my baby? By 18 weeks, your baby may be about 6 inches long from the top of the head to the rump (baby's bottom)  Your baby may weigh about 11 ounces  You may be able to feel your baby's movement at about 18 weeks or later  The first movements may not be that noticeable  They may feel like a fluttering sensation  Your baby also makes sucking movements and can hear certain sounds  What do I need to know about prenatal care? During the first 28 weeks of your pregnancy, you will see your healthcare provider once a month  Your healthcare provider will check your blood pressure and weight   You may also need any of the following:  · A urine test  may also be done to check for sugar and protein  These can be signs of gestational diabetes or infection  · A blood test  may be done to check for anemia (low iron level)  · Fundal height check  is a measurement of your uterus to check your baby's growth  This number is usually the same as the number of weeks that you have been pregnant  · An ultrasound  may be done to check your baby's development  Your healthcare provider may be able to tell you what your baby's gender is during the ultrasound  · Your baby's heart rate  will be checked  When should I seek immediate care? · You have pain or cramping in your abdomen or low back  · You have heavy vaginal bleeding or clotting  · You pass material that looks like tissue or large clots  Collect the material and bring it with you  When should I contact my healthcare provider? · You cannot keep food or drinks down, and you are losing weight  · You have light bleeding  · You have chills or a fever  · You have vaginal itching, burning, or pain  · You have yellow, green, white, or foul-smelling vaginal discharge  · You have pain or burning when you urinate, less urine than usual, or pink or bloody urine  · You have questions or concerns about your condition or care  CARE AGREEMENT:   You have the right to help plan your care  Learn about your health condition and how it may be treated  Discuss treatment options with your healthcare providers to decide what care you want to receive  You always have the right to refuse treatment  The above information is an  only  It is not intended as medical advice for individual conditions or treatments  Talk to your doctor, nurse or pharmacist before following any medical regimen to see if it is safe and effective for you  © Copyright [address] Information is for End User's use only and may not be sold, redistributed or otherwise used for commercial purposes   All illustrations and images included in CareNotes® are the copyrighted property of A D A M , Inc  or Chelsea Nguyen

## 2021-06-14 NOTE — ASSESSMENT & PLAN NOTE
No bleeding noted during speculum exam   Cx thick and closed  Wet prep shows + yeast with negative trich, clue and whiff  Was treated with one diflucan in ED on 6/12  Advised OTC Monistat for same  Vaginitis panel sent  Consult to M given and advised to call ASAP to schedule

## 2021-06-14 NOTE — ASSESSMENT & PLAN NOTE
Luis Miguel Stevo Freitas  is a 23 y o   @15w3d who presents for problem prenatal visit  Was in ED today for bleeding and pelvic pain  She has been spotting and cramping since Saturday  Had bedside U/S that showed + IUP  She took Diflucan for yeast without relief  Vaginitis panel sent as she is bleeding today  She missed her early U/S with MFM  Referral placed for consult with U/S  No fetal movement yet  Denies leakage of fluid  Reviewed reasons to call

## 2021-06-15 ENCOUNTER — TELEPHONE (OUTPATIENT)
Dept: OBGYN CLINIC | Facility: MEDICAL CENTER | Age: 26
End: 2021-06-15

## 2021-06-15 ENCOUNTER — ROUTINE PRENATAL (OUTPATIENT)
Dept: OBGYN CLINIC | Facility: MEDICAL CENTER | Age: 26
End: 2021-06-15
Payer: COMMERCIAL

## 2021-06-15 VITALS — BODY MASS INDEX: 24.28 KG/M2 | DIASTOLIC BLOOD PRESSURE: 76 MMHG | WEIGHT: 155 LBS | SYSTOLIC BLOOD PRESSURE: 104 MMHG

## 2021-06-15 DIAGNOSIS — R10.2 PELVIC PAIN: Primary | ICD-10-CM

## 2021-06-15 PROCEDURE — 99213 OFFICE O/P EST LOW 20 MIN: CPT | Performed by: NURSE PRACTITIONER

## 2021-06-15 PROCEDURE — 81514 NFCT DS BV&VAGINITIS DNA ALG: CPT | Performed by: NURSE PRACTITIONER

## 2021-06-15 RX ORDER — EPINEPHRINE 0.3 MG/.3ML
INJECTION SUBCUTANEOUS
Qty: 2 EACH | Refills: 0 | Status: SHIPPED | OUTPATIENT
Start: 2021-06-15

## 2021-06-15 NOTE — PROGRESS NOTES
Patient here for vaginitis panel  Needed to be recollected  Patient denies spotting, cramping or LOF

## 2021-06-15 NOTE — PROGRESS NOTES
Pelvic pain  Here for repeat vaginitis swab as one sent yesterday had   Collected without difficulty  No more spotting or pain  Going to [facility] on Friday

## 2021-06-15 NOTE — ASSESSMENT & PLAN NOTE
Here for repeat vaginitis swab as one sent yesterday had   Collected without difficulty  No more spotting or pain  Going to [facility] on Friday

## 2021-06-16 ENCOUNTER — TELEPHONE (OUTPATIENT)
Dept: OBGYN CLINIC | Facility: CLINIC | Age: 26
End: 2021-06-16

## 2021-06-16 LAB
C GLABRATA DNA VAG QL NAA+PROBE: NEGATIVE
C KRUSEI DNA VAG QL NAA+PROBE: NEGATIVE
CANDIDA SP 6 PNL VAG NAA+PROBE: POSITIVE
T VAGINALIS DNA VAG QL NAA+PROBE: NEGATIVE
VAGINOSIS/ITIS DNA PNL VAG PROBE+SIG AMP: POSITIVE

## 2021-06-17 ENCOUNTER — TELEPHONE (OUTPATIENT)
Dept: OBGYN CLINIC | Facility: CLINIC | Age: 26
End: 2021-06-17

## 2021-06-17 DIAGNOSIS — N76.0 BV (BACTERIAL VAGINOSIS): Primary | ICD-10-CM

## 2021-06-17 DIAGNOSIS — B96.89 BV (BACTERIAL VAGINOSIS): Primary | ICD-10-CM

## 2021-06-17 RX ORDER — METRONIDAZOLE 500 MG/1
500 TABLET ORAL EVERY 12 HOURS SCHEDULED
Qty: 14 TABLET | Refills: 0 | Status: SHIPPED | OUTPATIENT
Start: 2021-06-17 | End: 2021-06-24

## 2021-06-22 ENCOUNTER — OFFICE VISIT (OUTPATIENT)
Dept: PERINATAL CARE | Facility: OTHER | Age: 26
End: 2021-06-22
Payer: COMMERCIAL

## 2021-06-22 VITALS
HEART RATE: 73 BPM | SYSTOLIC BLOOD PRESSURE: 110 MMHG | WEIGHT: 158.8 LBS | DIASTOLIC BLOOD PRESSURE: 74 MMHG | BODY MASS INDEX: 24.07 KG/M2 | HEIGHT: 68 IN

## 2021-06-22 DIAGNOSIS — Z34.82 ENCOUNTER FOR SUPERVISION OF OTHER NORMAL PREGNANCY IN SECOND TRIMESTER: ICD-10-CM

## 2021-06-22 DIAGNOSIS — Z3A.16 16 WEEKS GESTATION OF PREGNANCY: ICD-10-CM

## 2021-06-22 DIAGNOSIS — O46.92 SECOND TRIMESTER BLEEDING: Primary | ICD-10-CM

## 2021-06-22 PROCEDURE — 76805 OB US >/= 14 WKS SNGL FETUS: CPT | Performed by: OBSTETRICS & GYNECOLOGY

## 2021-06-22 PROCEDURE — 99242 OFF/OP CONSLTJ NEW/EST SF 20: CPT | Performed by: OBSTETRICS & GYNECOLOGY

## 2021-06-22 NOTE — LETTER
June 26, 2021     Willielillie [address]  [address] [address]    Patient: Meenu Glasgow   YOB: 1995   Date of Visit: 6/22/2021       Dear Dr Iris Mcneill: Thank you for referring Israel Alex to me for evaluation  Below are my notes for this consultation  If you have questions, please do not hesitate to call me  I look forward to following your patient along with you  Sincerely,        Erica Freedman MD        CC: No Recipients  Erica Freedman MD  6/22/2021  7:40 AM  Sign when Signing Visit   Please refer to the [facility] ultrasound report in Ob Procedures for additional information regarding today's visit

## 2021-06-26 RX ORDER — ASPIRIN 81 MG/1
162 TABLET, CHEWABLE ORAL DAILY
Qty: 180 TABLET | Refills: 1 | Status: SHIPPED | OUTPATIENT
Start: 2021-06-26 | End: 2022-01-04 | Stop reason: ALTCHOICE

## 2021-07-16 ENCOUNTER — TELEPHONE (OUTPATIENT)
Dept: OBGYN CLINIC | Facility: CLINIC | Age: 26
End: 2021-07-16

## 2021-07-16 ENCOUNTER — APPOINTMENT (OUTPATIENT)
Dept: LAB | Facility: MEDICAL CENTER | Age: 26
End: 2021-07-16
Payer: COMMERCIAL

## 2021-07-16 ENCOUNTER — ROUTINE PRENATAL (OUTPATIENT)
Dept: OBGYN CLINIC | Facility: MEDICAL CENTER | Age: 26
End: 2021-07-16
Payer: COMMERCIAL

## 2021-07-16 VITALS — SYSTOLIC BLOOD PRESSURE: 112 MMHG | DIASTOLIC BLOOD PRESSURE: 70 MMHG | BODY MASS INDEX: 23.29 KG/M2 | WEIGHT: 153.2 LBS

## 2021-07-16 DIAGNOSIS — Z36.9 ENCOUNTER FOR ANTENATAL SCREENING: ICD-10-CM

## 2021-07-16 DIAGNOSIS — Z34.92 PREGNANCY, OBSTETRICAL CARE, SECOND TRIMESTER: Primary | ICD-10-CM

## 2021-07-16 DIAGNOSIS — Z33.1 PREGNANT STATE, INCIDENTAL: ICD-10-CM

## 2021-07-16 LAB
SL AMB  POCT GLUCOSE, UA: NORMAL
SL AMB POCT URINE PROTEIN: NORMAL

## 2021-07-16 PROCEDURE — 84702 CHORIONIC GONADOTROPIN TEST: CPT

## 2021-07-16 PROCEDURE — 99213 OFFICE O/P EST LOW 20 MIN: CPT | Performed by: STUDENT IN AN ORGANIZED HEALTH CARE EDUCATION/TRAINING PROGRAM

## 2021-07-16 PROCEDURE — 82677 ASSAY OF ESTRIOL: CPT

## 2021-07-16 PROCEDURE — 86336 INHIBIN A: CPT

## 2021-07-16 PROCEDURE — 82105 ALPHA-FETOPROTEIN SERUM: CPT

## 2021-07-16 NOTE — PROGRESS NOTES
27 y o  Pinetta Dye at 20w0d presents for routine prenatal visit  She denies contractions/leakage of fluid/vaginal bleeding  She feels good fetal movement  Problem List Items Addressed This Visit    Visit Diagnoses     Pregnancy, obstetrical care, second trimester    -  Primary  Early US reassuring - repeat scheduled  Continue ASA  Weight gain 3# - recommend 25-35 throughout pregnancy    Encounter for  screening      Desires testing - counseled on available options at this gestational age: quad screen vs  NIPT  Elects for quad      Relevant Orders    QUAD Screen    Pregnant state, incidental        Relevant Orders    QUAD Screen

## 2021-07-16 NOTE — TELEPHONE ENCOUNTER
Hi Dr. Blandon,    Looks like you saw this pt today. If ok, please rx for zofran and send to bin, thanks!

## 2021-07-16 NOTE — TELEPHONE ENCOUNTER
Patient called and would like to know if we can refill her Zofran.   Pharmacy and call back number correct.

## 2021-07-17 DIAGNOSIS — O21.9 NAUSEA AND VOMITING IN PREGNANCY: ICD-10-CM

## 2021-07-17 RX ORDER — ONDANSETRON 4 MG/1
4 TABLET, ORALLY DISINTEGRATING ORAL EVERY 6 HOURS PRN
Qty: 20 TABLET | Refills: 1 | Status: SHIPPED | OUTPATIENT
Start: 2021-07-17 | End: 2021-10-26

## 2021-07-18 LAB
2ND TRIMESTER 4 SCREEN SERPL-IMP: NORMAL
2ND TRIMESTER 4 SCREEN SERPL-IMP: NORMAL
AFP ADJ MOM SERPL: 1.21
AFP SERPL-MCNC: 76.2 NG/ML
AGE AT DELIVERY: 24.4 YR
FET TS 18 RISK FROM MAT AGE: NORMAL
FET TS 21 RISK FROM MAT AGE: 1056
GA METHOD: NORMAL
GA: 20 WEEKS
HCG ADJ MOM SERPL: 0.2
HCG SERPL-ACNC: 4694 MIU/ML
IDDM PATIENT QL: NO
INHIBIN A ADJ MOM SERPL: 0.55
INHIBIN A SERPL-MCNC: 99.5 PG/ML
KARYOTYP BLD/T: NORMAL
MULTIPLE PREGNANCY: NORMAL
NEURAL TUBE DEFECT RISK FETUS: NORMAL %
SERVICE CMNT-IMP: NORMAL
TS 18 RISK FETUS: NORMAL
TS 21 RISK FETUS: NORMAL
U ESTRIOL ADJ MOM SERPL: 1.2
U ESTRIOL SERPL-MCNC: 2.68 NG/ML

## 2021-07-22 ENCOUNTER — ROUTINE PRENATAL (OUTPATIENT)
Dept: PERINATAL CARE | Facility: OTHER | Age: 26
End: 2021-07-22
Payer: COMMERCIAL

## 2021-07-22 ENCOUNTER — TELEPHONE (OUTPATIENT)
Dept: OBGYN CLINIC | Facility: CLINIC | Age: 26
End: 2021-07-22

## 2021-07-22 VITALS
HEIGHT: 68 IN | SYSTOLIC BLOOD PRESSURE: 112 MMHG | WEIGHT: 155.6 LBS | BODY MASS INDEX: 23.58 KG/M2 | HEART RATE: 74 BPM | DIASTOLIC BLOOD PRESSURE: 75 MMHG

## 2021-07-22 DIAGNOSIS — O46.92 SECOND TRIMESTER BLEEDING: ICD-10-CM

## 2021-07-22 DIAGNOSIS — Z36.3 ENCOUNTER FOR ANTENATAL SCREENING FOR MALFORMATIONS: Primary | ICD-10-CM

## 2021-07-22 DIAGNOSIS — Z3A.20 20 WEEKS GESTATION OF PREGNANCY: ICD-10-CM

## 2021-07-22 DIAGNOSIS — Z34.81 PRENATAL CARE, SUBSEQUENT PREGNANCY, FIRST TRIMESTER: ICD-10-CM

## 2021-07-22 DIAGNOSIS — Z36.86 ENCOUNTER FOR ANTENATAL SCREENING FOR CERVICAL LENGTH: ICD-10-CM

## 2021-07-22 PROCEDURE — 76805 OB US >/= 14 WKS SNGL FETUS: CPT | Performed by: OBSTETRICS & GYNECOLOGY

## 2021-07-22 PROCEDURE — 99213 OFFICE O/P EST LOW 20 MIN: CPT | Performed by: OBSTETRICS & GYNECOLOGY

## 2021-07-22 PROCEDURE — 76817 TRANSVAGINAL US OBSTETRIC: CPT | Performed by: OBSTETRICS & GYNECOLOGY

## 2021-07-22 NOTE — TELEPHONE ENCOUNTER
Patient in Need of a letter for [facility] that she  is pregnant    FAX: [phone]

## 2021-07-22 NOTE — PROGRESS NOTES
Please refer to the [facility] ultrasound report in Ob Procedures for additional information regarding today's visit

## 2021-07-22 NOTE — LETTER
July 22, 2021     Anthony Calderon [address]  [address] [address]    Patient: Trey Gay   YOB: 1995   Date of Visit: 7/22/2021       Dear Dr Anali Wilder: Thank you for referring Len Garcia to me for evaluation  Below are my notes for this consultation  If you have questions, please do not hesitate to call me  I look forward to following your patient along with you  Sincerely,        Gina Erickson MD        CC: No Recipients  Gina Erickson MD  7/22/2021  7:46 AM  Sign when Signing Visit   Please refer to the [facility] ultrasound report in Ob Procedures for additional information regarding today's visit

## 2021-08-03 ENCOUNTER — TELEPHONE (OUTPATIENT)
Dept: PERINATAL CARE | Facility: CLINIC | Age: 26
End: 2021-08-03

## 2021-08-03 NOTE — TELEPHONE ENCOUNTER
Left patient a message that her MFM appointment had to be rescheduled  The new time, date and location were provided - 10/14/21  3:15  PRECIOUS  The patient has been instructed to please call us back at [phone] with any questions or concerns

## 2021-08-09 ENCOUNTER — ROUTINE PRENATAL (OUTPATIENT)
Dept: OBGYN CLINIC | Facility: MEDICAL CENTER | Age: 26
End: 2021-08-09
Payer: COMMERCIAL

## 2021-08-09 VITALS — BODY MASS INDEX: 24.15 KG/M2 | DIASTOLIC BLOOD PRESSURE: 66 MMHG | SYSTOLIC BLOOD PRESSURE: 94 MMHG | WEIGHT: 158.8 LBS

## 2021-08-09 DIAGNOSIS — O46.92 SECOND TRIMESTER BLEEDING: ICD-10-CM

## 2021-08-09 DIAGNOSIS — Z34.92 PREGNANCY, OBSTETRICAL CARE, SECOND TRIMESTER: Primary | ICD-10-CM

## 2021-08-09 PROBLEM — Z11.3 SCREENING FOR STDS (SEXUALLY TRANSMITTED DISEASES): Status: RESOLVED | Noted: 2021-01-12 | Resolved: 2021-08-09

## 2021-08-09 LAB
SL AMB  POCT GLUCOSE, UA: NORMAL
SL AMB POCT URINE PROTEIN: NORMAL

## 2021-08-09 PROCEDURE — 99213 OFFICE O/P EST LOW 20 MIN: CPT | Performed by: NURSE PRACTITIONER

## 2021-08-09 NOTE — PROGRESS NOTES
Patient here for PN visit  She denies spotting or cramping  Good fetal movement  Gender is a surprise! 28 week lab slip given  She did not have a covid vaccine  Urine neg for protein and glucose

## 2021-08-09 NOTE — PROGRESS NOTES
Pregnancy, obstetrical care, second trimester    Indira Macdonald  is a 27 y o  Ehsan Sit @23w3d who presents for routine prenatal visit  Denies OB complaints  Good fetal movement  Denies contractions, cramping, leakage of fluid or vaginal bleeding  Negative quad screen  7/22 Normal anatomy scan  For growth @ 32 weeks  Not finding out gender  Continue ASA until 36 weeks   Lab slip given for 28 week labs  TDap @ next visit   Declines COVID vaccine  Reviewed reasons to call  Second trimester bleeding  Resolved since completing meds for BV

## 2021-08-09 NOTE — ASSESSMENT & PLAN NOTE
Sabina Grimaldo  is a 27 y o  Nai Osman @23w3d who presents for routine prenatal visit  Denies OB complaints  Good fetal movement  Denies contractions, cramping, leakage of fluid or vaginal bleeding  Negative quad screen  7/22 Normal anatomy scan  For growth @ 32 weeks  Not finding out gender  Continue ASA until 36 weeks   Lab slip given for 28 week labs  TDap @ next visit   Declines COVID vaccine  Reviewed reasons to call

## 2021-08-09 NOTE — PATIENT INSTRUCTIONS
Pregnancy at 23 to 26 [address] [place] [address]:   What changes are happening in my body? You are now close to or at the beginning of the third trimester  The third trimester starts at 24 weeks and ends with delivery  As your baby gets larger, you may develop certain symptoms  These may include pain in your back or down the sides of your abdomen  You may also have stretch marks on your abdomen, breasts, thighs, or buttocks  You may also have constipation  How do I care for myself at this stage of my pregnancy? · Eat a variety of healthy foods  Healthy foods include fruits, vegetables, whole-grain breads, low-fat dairy foods, beans, lean meats, and fish  Drink liquids as directed  Ask how much liquid to drink each day and which liquids are best for you  Limit caffeine to less than 200 milligrams each day  Limit your intake of fish to 2 servings each week  Choose fish low in mercury such as canned light tuna, shrimp, salmon, cod, or tilapia  Do not  eat fish high in mercury such as swordfish, tilefish, chayo mackerel, and shark  · Manage back pain  Do not stand for long periods of time or lift heavy items  Use good posture while you stand, squat, or bend  Wear low-heeled shoes with good support  Rest may also help to relieve back pain  Ask your healthcare provider about exercises you can do to strengthen your back muscles  · Take prenatal vitamins as directed  Your need for certain vitamins and minerals, such as folic acid, increases during pregnancy  Prenatal vitamins provide some of the extra vitamins and minerals you need  Prenatal vitamins may also help to decrease the risk of certain birth defects  · Talk to your healthcare provider about exercise  Moderate exercise can help you stay fit  Your healthcare provider will help you plan an exercise program that is safe for you during pregnancy  · Do not smoke    Smoking increases your risk of a miscarriage and other health problems during your pregnancy  Smoking can cause your baby to be born too early or weigh less at birth  Ask your healthcare provider for information if you need help quitting  · Do not drink alcohol  Alcohol passes from your body to your baby through the placenta  It can affect your baby's brain development and cause fetal alcohol syndrome (FAS)  FAS is a group of conditions that causes mental, behavior, and growth problems  · Talk to your healthcare provider before you take any medicines  Many medicines may harm your baby if you take them when you are pregnant  Do not take any medicines, vitamins, herbs, or supplements without first talking to your healthcare provider  Never use illegal or street drugs (such as marijuana or cocaine) while you are pregnant  What are some safety tips during pregnancy? · Avoid hot tubs and saunas  Do not use a hot tub or sauna while you are pregnant, especially during your first trimester  Hot tubs and saunas may raise your baby's temperature and increase the risk of birth defects  · Avoid toxoplasmosis  This is an infection caused by eating raw meat or being around infected cat feces  It can cause birth defects, miscarriages, and other problems  Wash your hands after you touch raw meat  Make sure any meat is well-cooked before you eat it  Avoid raw eggs and unpasteurized milk  Use gloves or ask someone else to clean your cat's litter box while you are pregnant  What changes are happening with my baby? By 26 weeks, your baby will weigh about 2 pounds  Your baby will be about 10 inches long from the top of the head to the rump (baby's bottom)  Your baby's movements are much stronger now  Your baby's eyes are almost completely formed and can partially open  Your baby also sleeps and wakes up  What do I need to know about prenatal care? Your healthcare provider will check your blood pressure and weight   You may also need the following:  · A urine test  may also be done to check for sugar and protein  These can be signs of gestational diabetes or infection  Protein in your urine may also be a sign of preeclampsia  Preeclampsia is a condition that can develop during week 20 or later of your pregnancy  It causes high blood pressure, and it can cause problems with your kidneys and other organs  · Fundal height  is a measurement of your uterus to check your baby's growth  This number is usually the same as the number of weeks that you have been pregnant  · Your baby's heart rate  will be checked  When should I seek immediate care? · You develop a severe headache that does not go away  · You have new or increased vision changes, such as blurred or spotted vision  · You have new or increased swelling in your face or hands  · You have vaginal spotting or bleeding  · Your water broke or you feel warm water gushing or trickling from your vagina  When should I contact my healthcare provider? · You have abdominal cramps, pressure, or tightening  · You have a change in vaginal discharge  · You have light bleeding  · You have chills or a fever  · You have vaginal itching, burning, or pain  · You have yellow, green, white, or foul-smelling vaginal discharge  · You have pain or burning when you urinate, less urine than usual, or pink or bloody urine  · You have questions or concerns about your condition or care  CARE AGREEMENT:   You have the right to help plan your care  Learn about your health condition and how it may be treated  Discuss treatment options with your healthcare providers to decide what care you want to receive  You always have the right to refuse treatment  The above information is an  only  It is not intended as medical advice for individual conditions or treatments  Talk to your doctor, nurse or pharmacist before following any medical regimen to see if it is safe and effective for you    © Copyright [employer] 2021 Information is for [employer] use only and may not be sold, redistributed or otherwise used for commercial purposes   All illustrations and images included in CareNotes® are the copyrighted property of A D A M , Inc  or [facility] Marita Rodriguez

## 2021-08-24 NOTE — TELEPHONE ENCOUNTER
----- Message from Mino Mallory PA-C sent at 1/9/2019  1:54 PM EST -----  STD lab work results all neg  thanks Calling to check in on Parisa and to relay path   Will call back

## 2021-08-27 ENCOUNTER — APPOINTMENT (OUTPATIENT)
Dept: LAB | Facility: MEDICAL CENTER | Age: 26
End: 2021-08-27
Payer: COMMERCIAL

## 2021-08-27 DIAGNOSIS — Z34.92 PREGNANCY, OBSTETRICAL CARE, SECOND TRIMESTER: ICD-10-CM

## 2021-08-27 LAB
ERYTHROCYTE [DISTWIDTH] IN BLOOD BY AUTOMATED COUNT: 15.9 % (ref 11.6–15.1)
GLUCOSE 1H P 50 G GLC PO SERPL-MCNC: 108 MG/DL (ref 40–134)
HCT VFR BLD AUTO: 35.9 % (ref 34.8–46.1)
HGB BLD-MCNC: 12.1 G/DL (ref 11.5–15.4)
MCH RBC QN AUTO: 30.3 PG (ref 26.8–34.3)
MCHC RBC AUTO-ENTMCNC: 33.7 G/DL (ref 31.4–37.4)
MCV RBC AUTO: 90 FL (ref 82–98)
PLATELET # BLD AUTO: 188 THOUSANDS/UL (ref 149–390)
PMV BLD AUTO: 11.6 FL (ref 8.9–12.7)
RBC # BLD AUTO: 3.99 MILLION/UL (ref 3.81–5.12)
WBC # BLD AUTO: 8.19 THOUSAND/UL (ref 4.31–10.16)

## 2021-08-27 PROCEDURE — 36415 COLL VENOUS BLD VENIPUNCTURE: CPT

## 2021-08-27 PROCEDURE — 86592 SYPHILIS TEST NON-TREP QUAL: CPT

## 2021-08-27 PROCEDURE — 82950 GLUCOSE TEST: CPT

## 2021-08-27 PROCEDURE — 85027 COMPLETE CBC AUTOMATED: CPT

## 2021-08-30 ENCOUNTER — TELEPHONE (OUTPATIENT)
Dept: OBGYN CLINIC | Facility: CLINIC | Age: 26
End: 2021-08-30

## 2021-08-30 LAB — RPR SER QL: NORMAL

## 2021-09-01 ENCOUNTER — TELEPHONE (OUTPATIENT)
Dept: OBGYN CLINIC | Facility: CLINIC | Age: 26
End: 2021-09-01

## 2021-09-10 ENCOUNTER — ROUTINE PRENATAL (OUTPATIENT)
Dept: OBGYN CLINIC | Facility: MEDICAL CENTER | Age: 26
End: 2021-09-10
Payer: COMMERCIAL

## 2021-09-10 VITALS — BODY MASS INDEX: 24.78 KG/M2 | WEIGHT: 163 LBS | DIASTOLIC BLOOD PRESSURE: 64 MMHG | SYSTOLIC BLOOD PRESSURE: 115 MMHG

## 2021-09-10 DIAGNOSIS — Z34.83 PRENATAL CARE, SUBSEQUENT PREGNANCY IN THIRD TRIMESTER: Primary | ICD-10-CM

## 2021-09-10 DIAGNOSIS — Z23 NEED FOR TDAP VACCINATION: ICD-10-CM

## 2021-09-10 LAB
SL AMB  POCT GLUCOSE, UA: NORMAL
SL AMB POCT URINE PROTEIN: NORMAL

## 2021-09-10 PROCEDURE — 90715 TDAP VACCINE 7 YRS/> IM: CPT

## 2021-09-10 PROCEDURE — 99213 OFFICE O/P EST LOW 20 MIN: CPT | Performed by: STUDENT IN AN ORGANIZED HEALTH CARE EDUCATION/TRAINING PROGRAM

## 2021-09-10 PROCEDURE — 90471 IMMUNIZATION ADMIN: CPT

## 2021-09-10 NOTE — PROGRESS NOTES
27 y o  Luis lAberto Grullonor at 28w0d presents for routine prenatal visit  She denies contractions/leakage of fluid/vaginal bleeding  She feels good fetal movement  Problem List Items Addressed This Visit    Visit Diagnoses     Prenatal care, subsequent pregnancy in third trimester    -  Primary  Weight gain 13# - recommend 25-35  Need for Tdap vaccination        Relevant Orders    TDAP VACCINE GREATER THAN OR EQUAL TO 6YO IM          The patient was counseled about the labor process  She was counseled regarding potential reasons that she may need a  section including arrest of dilation/descent, non-reassuring fetal status, or worsening maternal status  She was counseled on the risks of  including bleeding, infection, and injury to surrounding structures including the bowel and bladder  She was counseled that there are medical and surgical methods to manage excessive postpartum bleeding  She was counseled that in the event of excessive blood loss, she may require blood transfusion which includes a small risk of blood borne diseases such as hepatitis and HIV  The patient is OK with receiving a blood transfusion if necessary  The patient had an opportunity to ask questions and signed consent  She was counseled about the possible need for operative delivery using the vacuum and the indications for doing so  She was counseled that there is a small risk of  complications including intracranial hemorrhage  The patient signed consent

## 2021-09-10 NOTE — PROGRESS NOTES
Pt is here for routine PN visit  28 week labs normal  Gave yellow folder  Delivery consent signed today  Tdap given today with VIS

## 2021-09-21 ENCOUNTER — CONSULT (OUTPATIENT)
Dept: NEUROLOGY | Facility: CLINIC | Age: 26
End: 2021-09-21
Payer: COMMERCIAL

## 2021-09-21 VITALS
TEMPERATURE: 97.7 F | SYSTOLIC BLOOD PRESSURE: 110 MMHG | HEART RATE: 80 BPM | HEIGHT: 68 IN | DIASTOLIC BLOOD PRESSURE: 62 MMHG | BODY MASS INDEX: 24.86 KG/M2 | WEIGHT: 164 LBS

## 2021-09-21 DIAGNOSIS — G51.0 BELL'S PALSY: ICD-10-CM

## 2021-09-21 PROCEDURE — 99243 OFF/OP CNSLTJ NEW/EST LOW 30: CPT | Performed by: PSYCHIATRY & NEUROLOGY
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
## 2021-09-23 PROBLEM — Z34.93 PRENATAL CARE IN THIRD TRIMESTER: Status: ACTIVE | Noted: 2021-08-09

## 2021-09-23 NOTE — PATIENT INSTRUCTIONS
Valuable Online Resource:    [facility] pregnancy essential guide    [email or web address] [email or web address]      On the right side of the screen is a 50 page guide providing valuable information about your entire pregnancy  On the left hand side of the site you will see several other links to great information and resources that [facility] offers     If you click on the tab that says "Pregnancy and Birth Packet" this opens another 150 page guide to labor and delivery information as well as breast feeding information,  care, pediatricians, car seat safety and much more     The [facility] and [address] tab has a virtual tour of the new L&D unit, as well as valuable information about classes that are offered, breast feeding support, support groups and much more  Click around and enjoy all we have to offer! Please note that all information in regards to locations and visiting hours have not been updated due to COVID    We only deliver our babies at one location which is at [facility] [address], [address] Contractions   AMBULATORY CARE:   Dionne Lobe contractions  are tightening and squeezing of the muscles of your uterus (womb) during pregnancy  The uterine muscles control the uterus  Kodak Sanchez contractions stop on their own  They are not true labor contractions and do not cause your cervix (opening to your uterus) to dilate (open)  Common symptoms include the following:   · Pain or discomfort in your groin or lower abdomen that comes and goes    · Your contractions are short, and do not last longer each time they happen    · Your contractions do not get closer together each time    · Your contractions do not get stronger or more painful each time    · Your contractions stop when you change your position or rest    Seek care immediately if:   · You have bleeding from your vagina      · You have fluid leaking from your vagina that does not stop  · You feel a gush of fluid from your vagina  · Your contractions happen every 5 minutes or sooner, and last for more than 60 seconds  · Your contractions begin to feel stronger or more painful  · You feel a change in your baby's movement, or you feel fewer than 6 to 10 movements in an hour  Call your doctor or obstetrician if:   · You have a fever  · You have questions or concerns about your condition or care  Treatment for [facility] contractions  may include pain medicine to relieve discomfort or pain or sedatives to relax the muscles of your uterus  If you are dehydrated, he or she may give you fluids through an IV or tell you to drink liquids  Self-care:   · Change your activity or your position  when you feel contractions begin  Walk if you have been lying or sitting  Lie down if you have been standing or walking  True labor will not stop by changing your position or activity  · Take a warm bath  to relax your body  · Drink more liquids  to prevent dehydration  Ask how much liquid to drink each day and which liquids are best for you  · Practice your labor breathing  to decrease your discomfort  This may help you get ready for true labor  Take slow, deep breaths, or fast, short breaths  Ask your healthcare provider how to practice labor breathing  Follow up with your doctor or obstetrician as directed:  Write down your questions so you remember to ask them during your visits  © Copyright [address] 2021 Information is for End User's use only and may not be sold, redistributed or otherwise used for commercial purposes  All illustrations and images included in CareNotes® are the copyrighted property of A D A M , Inc  or [facility] Marita Rodriguez   The above information is an  only  It is not intended as medical advice for individual conditions or treatments   Talk to your doctor, nurse or pharmacist before following any medical regimen to see if it is safe and effective for you  Round Ligament Pain   WHAT YOU NEED TO KNOW:   What is round ligament pain? Round ligament pain is caused when ligaments are stretched as your uterus (womb) gets bigger during pregnancy  Round ligaments are found on each side of your uterus  They are bands of tissue that hold the uterus in place  Round ligament pain happens most often during the second trimester  It is a normal part of pregnancy and should stop by the third trimester  The pain is not serious and will not hurt your baby  What are the signs and symptoms of round ligament pain? · Pain on one or both sides of your lower abdomen or groin that may move up to your hip    · Spasms in the muscles in your abdomen    · Pain that lasts a few seconds    · Pain that happens when you exercise, sneeze, change positions, or stand quickly    How is round ligament pain diagnosed? Your healthcare provider will examine you and ask about your pain  Tell the provider when the pain started, and if you feel it on one or both sides  Your provider may ask if anything helps the pain or makes it worse  What can I do to manage my pain? Round ligament pain does not need to be treated  The following may help make you more comfortable:  · Rest as often as you can  Rest can help relieve round ligament pain  You might want to lie on the side that has pain  Place a pillow under your abdomen  Keep another pillow between your knees  · Move slowly  Sudden movement can stretch the ligaments and cause pain  Stand, sit, and change positions slowly  Try to tighten the muscles in your hips before you sneeze or laugh  You can also sit down and bring your knees up toward your abdomen  This can help relieve tension on the ligaments  · Exercise as directed  Gentle exercise can keep the ligaments loose and strengthen core (abdominal) muscles  An example is swimming, or a yoga program designed for pregnancy   Ask your healthcare provider which exercises are safe for you and how often to exercise  For most healthy women, a good goal is to try to get at least 30 minutes of exercise every day  If activity causes pain, try not to walk too long or too far at one time  Break your exercise up into short amounts  · Apply a warm compress to the area  Warmth can relieve pain and muscle spasms  Ask your healthcare provider if you can take a warm bath or use a heating pad  Keep all heat settings low  High heat can be dangerous for your baby  Do not sit in a hot tub or use hot water in your bath  You may also be able to massage the area gently while you are applying heat  Massage can help relieve pain  · Ask about pain medicines  Ask your healthcare provider before you take any medicine during pregnancy, including over-the-counter pain medicines  Your healthcare provider may recommend acetaminophen to relieve the pain  Ask how much to take and how often to take it  Follow directions  Acetaminophen can cause liver damage  Too much medicine can be harmful to your baby  When should I contact my healthcare provider? · You have pain that is spreading to other parts of your body  · You have new or worsening pain  · You have pain that lasts longer than a few minutes at a time  · You have questions or concerns about your condition or care  CARE AGREEMENT:   You have the right to help plan your care  Learn about your health condition and how it may be treated  Discuss treatment options with your healthcare providers to decide what care you want to receive  You always have the right to refuse treatment  The above information is an  only  It is not intended as medical advice for individual conditions or treatments  Talk to your doctor, nurse or pharmacist before following any medical regimen to see if it is safe and effective for you    © Copyright [employer] 2021 Information is for End User's use only and may not be sold, redistributed or otherwise used for commercial purposes  All illustrations and images included in CareNotes® are the copyrighted property of A D A M , Inc  or Chelsea Marita Michael Nguyen        Pregnancy at 31 to 29 Weeks   AMBULATORY CARE:   What changes are happening to your body: You may continue to have symptoms such as shortness of breath, heartburn, contractions, or swelling of your ankles and feet  You may be gaining about 1 pound a week now  Seek care immediately if:   · You develop a severe headache that does not go away  · You have new or increased vision changes, such as blurred or spotted vision  · You have new or increased swelling in your face or hands  · You have vaginal spotting or bleeding  · Your water broke or you feel warm water gushing or trickling from your vagina  Contact your healthcare provider if:   · You have more than 5 contractions in 1 hour  · You notice any changes in your baby's movements  · You have abdominal cramps, pressure, or tightening  · You have a change in vaginal discharge  · You have chills or a fever  · You have vaginal itching, burning, or pain  · You have yellow, green, white, or foul-smelling vaginal discharge  · You have pain or burning when you urinate, less urine than usual, or pink or bloody urine  · You have questions or concerns about your condition or care  How to care for yourself at this stage of your pregnancy:   · Eat a variety of healthy foods  Healthy foods include fruits, vegetables, whole-grain breads, low-fat dairy foods, beans, lean meats, and fish  Drink liquids as directed  Ask how much liquid to drink each day and which liquids are best for you  Limit caffeine to less than 200 milligrams each day  Limit your intake of fish to 2 servings each week  Choose fish low in mercury such as canned light tuna, shrimp, salmon, cod, or tilapia   Do not  eat fish high in mercury such as swordfish, tilefish, chayo mackerel, and shark  · Manage heartburn  by eating 4 or 5 small meals each day instead of large meals  Avoid spicy food  · Manage swelling  by lying down and putting your feet up  · Take prenatal vitamins as directed  Your need for certain vitamins and minerals, such as folic acid, increases during pregnancy  Prenatal vitamins provide some of the extra vitamins and minerals you need  Prenatal vitamins may also help to decrease the risk of certain birth defects  · Talk to your healthcare provider about exercise  Moderate exercise can help you stay fit  Your healthcare provider will help you plan an exercise program that is safe for you during pregnancy  · Do not smoke  Smoking increases your risk of a miscarriage and other health problems during your pregnancy  Smoking can cause your baby to be born too early or weigh less at birth  Ask your healthcare provider for information if you need help quitting  · Do not drink alcohol  Alcohol passes from your body to your baby through the placenta  It can affect your baby's brain development and cause fetal alcohol syndrome (FAS)  FAS is a group of conditions that causes mental, behavior, and growth problems  · Talk to your healthcare provider before you take any medicines  Many medicines may harm your baby if you take them when you are pregnant  Do not take any medicines, vitamins, herbs, or supplements without first talking to your healthcare provider  Never use illegal or street drugs (such as marijuana or cocaine) while you are pregnant  Safety tips during pregnancy:   · Avoid hot tubs and saunas  Do not use a hot tub or sauna while you are pregnant, especially during your first trimester  Hot tubs and saunas may raise your baby's temperature and increase the risk of birth defects  · Avoid toxoplasmosis  This is an infection caused by eating raw meat or being around infected cat feces   It can cause birth defects, miscarriages, and other problems  Wash your hands after you touch raw meat  Make sure any meat is well-cooked before you eat it  Avoid raw eggs and unpasteurized milk  Use gloves or ask someone else to clean your cat's litter box while you are pregnant  Changes that are happening with your baby:  By 34 weeks, your baby may weigh more than 5 pounds  Your baby will be about 12 ½ inches long from the top of the head to the rump (baby's bottom)  Your baby is gaining about ½ pound a week  Your baby's eyes open and close now  Your baby's kicks and movements are more forceful at this time  What you need to know about prenatal care: Your healthcare provider will check your blood pressure and weight  You may also need the following:  · A urine test  may also be done to check for sugar and protein  These can be signs of gestational diabetes or infection  Protein in your urine may also be a sign of preeclampsia  Preeclampsia is a condition that can develop during week 20 or later of your pregnancy  It causes high blood pressure, and it can cause problems with your kidneys and other organs  · A Tdap vaccine  may be recommended by your healthcare provider  · Fundal height  is a measurement of your uterus to check your baby's growth  This number is usually the same as the number of weeks that you have been pregnant  Your healthcare provider may also check your baby's position  · Your baby's heart rate  will be checked  © Copyright [employer] 2021 Information is for End User's use only and may not be sold, redistributed or otherwise used for commercial purposes  All illustrations and images included in CareNotes® are the copyrighted property of [employer]  or Chelsea Rodriguez   The above information is an  only  It is not intended as medical advice for individual conditions or treatments   Talk to your doctor, nurse or pharmacist before following any medical regimen to see if it is safe and effective for you

## 2021-09-23 NOTE — ASSESSMENT & PLAN NOTE
Kishan Khan is a 27 y o    29w6d who presents for routine PNV  28 week labs reviewed:   Denies OB complaints  Good fetal movement  Denies contractions, cramping, leakage of fluid or vaginal bleeding  S/p flu vaccine  Reviewed  labor precautions and FKCs     Pregnancy Essential guide and Baby and Me web site recommended

## 2021-09-24 ENCOUNTER — ROUTINE PRENATAL (OUTPATIENT)
Dept: OBGYN CLINIC | Facility: CLINIC | Age: 26
End: 2021-09-24
Payer: COMMERCIAL

## 2021-09-24 VITALS
WEIGHT: 168 LBS | DIASTOLIC BLOOD PRESSURE: 70 MMHG | HEIGHT: 68 IN | BODY MASS INDEX: 25.46 KG/M2 | SYSTOLIC BLOOD PRESSURE: 110 MMHG

## 2021-09-24 DIAGNOSIS — Z34.83 ENCOUNTER FOR SUPERVISION OF NORMAL PREGNANCY IN MULTIGRAVIDA IN THIRD TRIMESTER: Primary | ICD-10-CM

## 2021-09-24 PROBLEM — Z30.09 GENERAL COUNSELING AND ADVICE FOR CONTRACEPTIVE MANAGEMENT: Status: RESOLVED | Noted: 2020-01-15 | Resolved: 2021-09-24

## 2021-09-24 PROBLEM — Z34.03 ENCOUNTER FOR SUPERVISION OF NORMAL FIRST PREGNANCY IN THIRD TRIMESTER: Status: ACTIVE | Noted: 2021-05-27

## 2021-09-24 LAB
SL AMB  POCT GLUCOSE, UA: NEGATIVE
SL AMB POCT URINE PROTEIN: NEGATIVE

## 2021-09-24 PROCEDURE — 99213 OFFICE O/P EST LOW 20 MIN: CPT | Performed by: OBSTETRICS & GYNECOLOGY

## 2021-09-24 NOTE — PROGRESS NOTES
Prenatal care in third trimester  Deisi Sommer is a 27 y o    29w6d who presents for routine PNV  28 week labs reviewed:   Denies OB complaints  Good fetal movement  Denies contractions, cramping, leakage of fluid or vaginal bleeding  S/p flu vaccine  Reviewed  labor precautions and FKCs  Pregnancy Essential guide and Baby and Me web site recommended     Encounter for supervision of normal first pregnancy in third trimester  Deisi Sommer is a 27 y o  Ro Coronel  30w0d who presents for routine PNV  28 week labs reviewed: WNL   Denies OB complaints  Good fetal movement  Denies contractions, cramping, leakage of fluid or vaginal bleeding  S/p Tdap vaccine  Reviewed  labor precautions and FKCs     Pregnancy Essential guide and Baby and Me web site recommended   Breast pump form completed

## 2021-09-24 NOTE — PROGRESS NOTES
Encounter for supervision of normal first pregnancy in third trimester  Chiquita Silverman is a 27 y o  Valorie Peak  30w0d who presents for routine PNV  28 week labs reviewed: WNL   Denies OB complaints  Good fetal movement  Denies contractions, cramping, leakage of fluid or vaginal bleeding  S/p Tdap vaccine  Reviewed  labor precautions and FKCs     Pregnancy Essential guide and Baby and Me web site recommended   Breast pump form completed

## 2021-09-24 NOTE — PROGRESS NOTES
Patient is here for her routine prenatal visit she reports positive fetal movements   Patient reports No lost of fluids and No contractions at this time  Patient is here at this visit with her partner both are very excited with the pregnancy

## 2021-09-24 NOTE — ASSESSMENT & PLAN NOTE
Gwen Carey is a 27 y o  Mercy Done  30w0d who presents for routine PNV  28 week labs reviewed: WNL   Denies OB complaints  Good fetal movement  Denies contractions, cramping, leakage of fluid or vaginal bleeding  S/p Tdap vaccine  Reviewed  labor precautions and FKCs     Pregnancy Essential guide and Baby and Me web site recommended   Breast pump form completed

## 2021-09-29 ENCOUNTER — IMMUNIZATIONS (OUTPATIENT)
Dept: FAMILY MEDICINE CLINIC | Facility: MEDICAL CENTER | Age: 26
End: 2021-09-29
Payer: COMMERCIAL

## 2021-09-29 DIAGNOSIS — Z23 ENCOUNTER FOR IMMUNIZATION: Primary | ICD-10-CM

## 2021-09-29 PROCEDURE — 90471 IMMUNIZATION ADMIN: CPT

## 2021-09-29 PROCEDURE — 90686 IIV4 VACC NO PRSV 0.5 ML IM: CPT

## 2021-10-08 ENCOUNTER — ROUTINE PRENATAL (OUTPATIENT)
Dept: OBGYN CLINIC | Facility: CLINIC | Age: 26
End: 2021-10-08
Payer: COMMERCIAL

## 2021-10-08 VITALS
SYSTOLIC BLOOD PRESSURE: 118 MMHG | HEIGHT: 68 IN | DIASTOLIC BLOOD PRESSURE: 70 MMHG | WEIGHT: 169.2 LBS | BODY MASS INDEX: 25.64 KG/M2

## 2021-10-08 DIAGNOSIS — Z34.83 ENCOUNTER FOR SUPERVISION OF NORMAL PREGNANCY IN MULTIGRAVIDA IN THIRD TRIMESTER: Primary | ICD-10-CM

## 2021-10-08 PROBLEM — J30.2 SEASONAL ALLERGIES: Status: RESOLVED | Noted: 2020-04-03 | Resolved: 2021-10-08

## 2021-10-08 LAB
SL AMB  POCT GLUCOSE, UA: NEGATIVE
SL AMB POCT URINE PROTEIN: NEGATIVE

## 2021-10-08 PROCEDURE — 99213 OFFICE O/P EST LOW 20 MIN: CPT | Performed by: OBSTETRICS & GYNECOLOGY

## 2021-10-14 ENCOUNTER — ULTRASOUND (OUTPATIENT)
Dept: PERINATAL CARE | Facility: OTHER | Age: 26
End: 2021-10-14
Payer: COMMERCIAL

## 2021-10-14 VITALS
DIASTOLIC BLOOD PRESSURE: 66 MMHG | HEIGHT: 68 IN | SYSTOLIC BLOOD PRESSURE: 101 MMHG | BODY MASS INDEX: 25.91 KG/M2 | HEART RATE: 70 BPM | WEIGHT: 171 LBS

## 2021-10-14 DIAGNOSIS — Z36.4 ULTRASOUND FOR ANTENATAL SCREENING FOR FETAL GROWTH RESTRICTION: Primary | ICD-10-CM

## 2021-10-14 DIAGNOSIS — O46.92 SECOND TRIMESTER BLEEDING: ICD-10-CM

## 2021-10-14 DIAGNOSIS — Z3A.32 32 WEEKS GESTATION OF PREGNANCY: ICD-10-CM

## 2021-10-14 PROCEDURE — 76816 OB US FOLLOW-UP PER FETUS: CPT | Performed by: OBSTETRICS & GYNECOLOGY

## 2021-10-14 PROCEDURE — 99213 OFFICE O/P EST LOW 20 MIN: CPT | Performed by: OBSTETRICS & GYNECOLOGY

## 2021-10-14 RX ORDER — ASPIRIN 81 MG/1
81 TABLET, CHEWABLE ORAL DAILY
COMMUNITY
End: 2022-01-04 | Stop reason: ALTCHOICE

## 2021-10-26 ENCOUNTER — ROUTINE PRENATAL (OUTPATIENT)
Dept: OBGYN CLINIC | Age: 26
End: 2021-10-26
Payer: COMMERCIAL

## 2021-10-26 VITALS — DIASTOLIC BLOOD PRESSURE: 64 MMHG | SYSTOLIC BLOOD PRESSURE: 108 MMHG | WEIGHT: 172.4 LBS | BODY MASS INDEX: 26.21 KG/M2

## 2021-10-26 DIAGNOSIS — Z34.83 PRENATAL CARE, SUBSEQUENT PREGNANCY IN THIRD TRIMESTER: ICD-10-CM

## 2021-10-26 DIAGNOSIS — Z34.93 PRENATAL CARE IN THIRD TRIMESTER: ICD-10-CM

## 2021-10-26 DIAGNOSIS — Z34.83 ENCOUNTER FOR SUPERVISION OF OTHER NORMAL PREGNANCY IN THIRD TRIMESTER: Primary | ICD-10-CM

## 2021-10-26 LAB
SL AMB  POCT GLUCOSE, UA: NEGATIVE
SL AMB POCT URINE PROTEIN: NEGATIVE

## 2021-10-26 PROCEDURE — 99213 OFFICE O/P EST LOW 20 MIN: CPT | Performed by: STUDENT IN AN ORGANIZED HEALTH CARE EDUCATION/TRAINING PROGRAM

## 2021-11-01 ENCOUNTER — OFFICE VISIT (OUTPATIENT)
Dept: FAMILY MEDICINE CLINIC | Facility: MEDICAL CENTER | Age: 26
End: 2021-11-01
Payer: COMMERCIAL

## 2021-11-01 VITALS
HEIGHT: 68 IN | TEMPERATURE: 99.8 F | WEIGHT: 178 LBS | DIASTOLIC BLOOD PRESSURE: 64 MMHG | RESPIRATION RATE: 16 BRPM | SYSTOLIC BLOOD PRESSURE: 106 MMHG | HEART RATE: 68 BPM | BODY MASS INDEX: 26.98 KG/M2

## 2021-11-01 DIAGNOSIS — Z00.00 PHYSICAL EXAM, ANNUAL: ICD-10-CM

## 2021-11-01 DIAGNOSIS — Z91.030 BEE STING ALLERGY: ICD-10-CM

## 2021-11-01 DIAGNOSIS — J30.89 ENVIRONMENTAL AND SEASONAL ALLERGIES: ICD-10-CM

## 2021-11-01 DIAGNOSIS — J45.20 MILD INTERMITTENT ASTHMA WITHOUT COMPLICATION: Primary | ICD-10-CM

## 2021-11-01 PROCEDURE — 3725F SCREEN DEPRESSION PERFORMED: CPT | Performed by: FAMILY MEDICINE

## 2021-11-01 PROCEDURE — 99395 PREV VISIT EST AGE 18-39: CPT | Performed by: FAMILY MEDICINE

## 2021-11-01 PROCEDURE — 99214 OFFICE O/P EST MOD 30 MIN: CPT | Performed by: FAMILY MEDICINE

## 2021-11-01 RX ORDER — FLUTICASONE PROPIONATE 50 MCG
2 SPRAY, SUSPENSION (ML) NASAL DAILY
Qty: 16 G | Refills: 3 | Status: SHIPPED | OUTPATIENT
Start: 2021-11-01

## 2021-11-09 ENCOUNTER — ROUTINE PRENATAL (OUTPATIENT)
Dept: OBGYN CLINIC | Age: 26
End: 2021-11-09
Payer: COMMERCIAL

## 2021-11-09 VITALS — WEIGHT: 176.6 LBS | DIASTOLIC BLOOD PRESSURE: 68 MMHG | SYSTOLIC BLOOD PRESSURE: 108 MMHG | BODY MASS INDEX: 26.85 KG/M2

## 2021-11-09 DIAGNOSIS — Z34.03 ENCOUNTER FOR SUPERVISION OF NORMAL FIRST PREGNANCY IN THIRD TRIMESTER: Primary | ICD-10-CM

## 2021-11-09 DIAGNOSIS — Z34.83 PRENATAL CARE, SUBSEQUENT PREGNANCY IN THIRD TRIMESTER: ICD-10-CM

## 2021-11-09 LAB
SL AMB  POCT GLUCOSE, UA: NEGATIVE
SL AMB POCT URINE PROTEIN: NEGATIVE

## 2021-11-09 PROCEDURE — 99213 OFFICE O/P EST LOW 20 MIN: CPT | Performed by: STUDENT IN AN ORGANIZED HEALTH CARE EDUCATION/TRAINING PROGRAM

## 2021-11-09 PROCEDURE — 87150 DNA/RNA AMPLIFIED PROBE: CPT | Performed by: STUDENT IN AN ORGANIZED HEALTH CARE EDUCATION/TRAINING PROGRAM

## 2021-11-11 LAB — GP B STREP DNA SPEC QL NAA+PROBE: NEGATIVE

## 2021-11-17 ENCOUNTER — ROUTINE PRENATAL (OUTPATIENT)
Dept: OBGYN CLINIC | Facility: CLINIC | Age: 26
End: 2021-11-17
Payer: COMMERCIAL

## 2021-11-17 VITALS — DIASTOLIC BLOOD PRESSURE: 72 MMHG | SYSTOLIC BLOOD PRESSURE: 110 MMHG | BODY MASS INDEX: 26.97 KG/M2 | WEIGHT: 177.4 LBS

## 2021-11-17 DIAGNOSIS — Z34.83 PRENATAL CARE, SUBSEQUENT PREGNANCY IN THIRD TRIMESTER: ICD-10-CM

## 2021-11-17 DIAGNOSIS — Z34.03 ENCOUNTER FOR SUPERVISION OF NORMAL FIRST PREGNANCY IN THIRD TRIMESTER: Primary | ICD-10-CM

## 2021-11-17 PROBLEM — Z34.93 PRENATAL CARE IN THIRD TRIMESTER: Status: RESOLVED | Noted: 2021-08-09 | Resolved: 2021-11-17

## 2021-11-17 LAB
SL AMB  POCT GLUCOSE, UA: NORMAL
SL AMB POCT URINE PROTEIN: NORMAL

## 2021-11-17 PROCEDURE — 99213 OFFICE O/P EST LOW 20 MIN: CPT | Performed by: PHYSICIAN ASSISTANT

## 2021-11-23 ENCOUNTER — ROUTINE PRENATAL (OUTPATIENT)
Dept: OBGYN CLINIC | Facility: CLINIC | Age: 26
End: 2021-11-23
Payer: COMMERCIAL

## 2021-11-23 ENCOUNTER — TELEPHONE (OUTPATIENT)
Dept: FAMILY MEDICINE CLINIC | Facility: MEDICAL CENTER | Age: 26
End: 2021-11-23

## 2021-11-23 VITALS
BODY MASS INDEX: 26.43 KG/M2 | DIASTOLIC BLOOD PRESSURE: 72 MMHG | WEIGHT: 174.4 LBS | SYSTOLIC BLOOD PRESSURE: 104 MMHG | HEIGHT: 68 IN

## 2021-11-23 DIAGNOSIS — Z34.03 ENCOUNTER FOR SUPERVISION OF NORMAL FIRST PREGNANCY IN THIRD TRIMESTER: ICD-10-CM

## 2021-11-23 DIAGNOSIS — Z34.83 PRENATAL CARE, SUBSEQUENT PREGNANCY IN THIRD TRIMESTER: Primary | ICD-10-CM

## 2021-11-23 LAB
SL AMB  POCT GLUCOSE, UA: NEGATIVE
SL AMB POCT URINE PROTEIN: NEGATIVE

## 2021-11-23 PROCEDURE — 99213 OFFICE O/P EST LOW 20 MIN: CPT | Performed by: STUDENT IN AN ORGANIZED HEALTH CARE EDUCATION/TRAINING PROGRAM

## 2021-11-26 ENCOUNTER — TELEPHONE (OUTPATIENT)
Dept: NEUROLOGY | Facility: CLINIC | Age: 26
End: 2021-11-26

## 2021-11-26 ENCOUNTER — TELEPHONE (OUTPATIENT)
Dept: OBGYN CLINIC | Facility: CLINIC | Age: 26
End: 2021-11-26

## 2021-11-26 NOTE — TELEPHONE ENCOUNTER
Dr La Nena Barcenas, last office visit 9/21 hx of bell's palsy    She called to ask for exemption for COVID vaccine; said she had COVID in February and then developed bell's palsy; was seen by Dr La Nena Barcenas 9/21; she feels this was related to having COVID; reporting recurrence of symptoms last month with facial droop but only lasted 3 days  I saw in Epic that she is currently pregnant, she confirmed  38 weeks     She said did not talk to PCP or OB regarding exemption but feels getting vaccine may cause recurrence of bell's palsy  Please provide recommendation, thank you      best  [phone]

## 2021-11-29 NOTE — TELEPHONE ENCOUNTER
Patient would need to discuss with family physician or infectious disease regarding the COVID vaccine I will defer it to them      Thank you

## 2021-12-01 ENCOUNTER — ROUTINE PRENATAL (OUTPATIENT)
Dept: OBGYN CLINIC | Facility: CLINIC | Age: 26
End: 2021-12-01
Payer: COMMERCIAL

## 2021-12-01 ENCOUNTER — TELEPHONE (OUTPATIENT)
Dept: OBGYN CLINIC | Facility: CLINIC | Age: 26
End: 2021-12-01

## 2021-12-01 VITALS
WEIGHT: 177.4 LBS | DIASTOLIC BLOOD PRESSURE: 80 MMHG | BODY MASS INDEX: 26.89 KG/M2 | HEIGHT: 68 IN | SYSTOLIC BLOOD PRESSURE: 120 MMHG

## 2021-12-01 DIAGNOSIS — Z34.03 ENCOUNTER FOR SUPERVISION OF NORMAL FIRST PREGNANCY IN THIRD TRIMESTER: Primary | ICD-10-CM

## 2021-12-01 LAB
SL AMB  POCT GLUCOSE, UA: NORMAL
SL AMB POCT URINE PROTEIN: NORMAL

## 2021-12-01 PROCEDURE — 99213 OFFICE O/P EST LOW 20 MIN: CPT | Performed by: STUDENT IN AN ORGANIZED HEALTH CARE EDUCATION/TRAINING PROGRAM

## 2021-12-07 ENCOUNTER — TELEPHONE (OUTPATIENT)
Dept: OBGYN CLINIC | Facility: CLINIC | Age: 26
End: 2021-12-07

## 2021-12-20 ENCOUNTER — TELEPHONE (OUTPATIENT)
Dept: OBGYN CLINIC | Facility: MEDICAL CENTER | Age: 26
End: 2021-12-20

## 2022-01-04 ENCOUNTER — POSTPARTUM VISIT (OUTPATIENT)
Dept: OBGYN CLINIC | Facility: CLINIC | Age: 27
End: 2022-01-04
Payer: COMMERCIAL

## 2022-01-04 VITALS — SYSTOLIC BLOOD PRESSURE: 120 MMHG | BODY MASS INDEX: 25.36 KG/M2 | WEIGHT: 166.8 LBS | DIASTOLIC BLOOD PRESSURE: 60 MMHG

## 2022-01-04 DIAGNOSIS — Z30.09 BIRTH CONTROL COUNSELING: ICD-10-CM

## 2022-01-04 PROBLEM — O46.92 SECOND TRIMESTER BLEEDING: Status: RESOLVED | Noted: 2021-06-14 | Resolved: 2022-01-04

## 2022-01-04 PROBLEM — R10.2 PELVIC PAIN: Status: RESOLVED | Noted: 2021-06-14 | Resolved: 2022-01-04

## 2022-01-04 PROBLEM — Z34.83 PRENATAL CARE, SUBSEQUENT PREGNANCY IN THIRD TRIMESTER: Status: RESOLVED | Noted: 2021-05-27 | Resolved: 2022-01-04

## 2022-01-04 PROCEDURE — 0503F POSTPARTUM CARE VISIT: CPT | Performed by: PHYSICIAN ASSISTANT

## 2022-01-04 PROCEDURE — 99213 OFFICE O/P EST LOW 20 MIN: CPT | Performed by: PHYSICIAN ASSISTANT

## 2022-01-04 RX ORDER — ACETAMINOPHEN AND CODEINE PHOSPHATE 120; 12 MG/5ML; MG/5ML
1 SOLUTION ORAL DAILY
Qty: 90 TABLET | Refills: 1 | Status: SHIPPED | OUTPATIENT
Start: 2022-01-04
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
## 2022-02-04 ENCOUNTER — TELEPHONE (OUTPATIENT)
Dept: OBGYN CLINIC | Facility: CLINIC | Age: 27
End: 2022-02-04

## 2022-02-04 NOTE — TELEPHONE ENCOUNTER
Pt called saying she received a letter saying that she can't receive the breast pump because she is missing her provider signatures on the form  Please advise! Thanks!

## 2022-02-16 ENCOUNTER — TELEPHONE (OUTPATIENT)
Dept: NEUROLOGY | Facility: CLINIC | Age: 27
End: 2022-02-16

## 2022-02-16 NOTE — TELEPHONE ENCOUNTER
Called patient at phone number [phone] and leave a message to confirm appointment for Monday February 21,2022  with Dez Padgett

## 2022-02-21 ENCOUNTER — OFFICE VISIT (OUTPATIENT)
Dept: NEUROLOGY | Facility: CLINIC | Age: 27
End: 2022-02-21
Payer: COMMERCIAL

## 2022-02-21 VITALS
SYSTOLIC BLOOD PRESSURE: 122 MMHG | TEMPERATURE: 97.8 F | BODY MASS INDEX: 25.43 KG/M2 | OXYGEN SATURATION: 97 % | DIASTOLIC BLOOD PRESSURE: 60 MMHG | HEART RATE: 57 BPM | WEIGHT: 167.8 LBS | HEIGHT: 68 IN

## 2022-02-21 DIAGNOSIS — R51.9 DAILY HEADACHE: Primary | ICD-10-CM

## 2022-02-21 DIAGNOSIS — H53.8 BLURRED VISION: ICD-10-CM

## 2022-02-21 PROCEDURE — 99214 OFFICE O/P EST MOD 30 MIN: CPT | Performed by: PSYCHIATRY & NEUROLOGY
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
## 2022-02-21 NOTE — PATIENT INSTRUCTIONS
Headache/migraine treatment:   - When you have a moderate to severe headache, you should seek rest, initiate relaxation and apply cold compresses to the head  Abortive medications (for immediate treatment of a headache): It is ok to take ibuprofen, acetaminophen or naproxen (Advil, Tylenol,  Aleve, Excedrin) if they help your headaches you should limit these to No more than 3 times a week to avoid medication overuse/rebound headaches  Over the counter preventive supplements for headaches/migraines   (can continue during breastfeeding)  (to take every day to help prevent headaches - not to take at the time of headache):  [x] Magnesium 400mg daily (If any diarrhea or upset stomach, decrease dose  as tolerated)  [x] Riboflavin (Vitamin B2) 200 mg (kids) to 400mg daily (adults) - try online   (FYI B2 may make your urine bright/neon yellow)    Self-Monitoring:  [x] Headache calendar  Each day golden a number from 0-10 indicating if there was a headache and how bad it was  This can be used to monitor gradual improvement and is helpful to make medication adjustments  You can do this on paper or there is an PROSPER for a smart phone called "Migraine e Diary"  Lifestyle Recommendations:  [x] SLEEP - Maintain a regular sleep schedule: Adults need at least 7-8 hours of uninterrupted a night  Maintain good sleep hygiene:  Going to bed and waking up at consistent times, avoiding excessive daytime naps, avoiding caffeinated beverages in the evening, avoid excessive stimulation in the evening and generally using bed primarily for sleeping  One hour before bedtime would recommend turning lights down lower, decreasing your activity (may read quietly, listen to music at a low volume)  When you get into bed, should eliminate all technology (no texting, emailing, playing with your phone, iPad or tablet in bed)  [x] HYDRATION - Maintain good hydration    Drink  2L of fluid a day (4 typical small water bottles)  [x] DIET - Maintain good nutrition  In particular don't skip meals and try and eat healthy balanced meals regularly  [x] TRIGGERS - Look for other triggers and avoid them: Limit caffeine to 1-2 cups a day or less  Avoid dietary triggers that you have noticed bring on your headaches (this could include aged cheese, peanuts, MSG, aspartame and nitrates)  [x] EXERCISE - physical exercise as we all know is good for you in many ways, and not only is good for your heart, but also is beneficial for your mental health, cognitive health and  chronic pain/headaches  I would encourage at the least 5 days of physical exercise weekly for at least 30 minutes  Education and Follow-up  [x] Please call with any questions or concerns  Of course if any new concerning symptoms go to the emergency department    [x] Follow up in 3 months sooner if needed  - Complete labs and MRI before then and see Ophthalmology (referral given today)
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
## 2022-02-22 ENCOUNTER — APPOINTMENT (OUTPATIENT)
Dept: LAB | Facility: MEDICAL CENTER | Age: 27
End: 2022-02-22
Payer: COMMERCIAL

## 2022-02-22 DIAGNOSIS — H53.8 BLURRED VISION: ICD-10-CM

## 2022-02-22 DIAGNOSIS — R51.9 DAILY HEADACHE: ICD-10-CM

## 2022-02-22 LAB
CRP SERPL QL: <3 MG/L
ERYTHROCYTE [SEDIMENTATION RATE] IN BLOOD: 18 MM/HOUR (ref 0–19)

## 2022-02-22 PROCEDURE — 86618 LYME DISEASE ANTIBODY: CPT

## 2022-02-22 PROCEDURE — 86140 C-REACTIVE PROTEIN: CPT

## 2022-02-22 PROCEDURE — 36415 COLL VENOUS BLD VENIPUNCTURE: CPT

## 2022-02-22 PROCEDURE — 85652 RBC SED RATE AUTOMATED: CPT

## 2022-02-23 LAB — B BURGDOR IGG+IGM SER-ACNC: 94

## 2022-02-28 ENCOUNTER — TELEPHONE (OUTPATIENT)
Dept: NEUROLOGY | Facility: CLINIC | Age: 27
End: 2022-02-28

## 2022-02-28 DIAGNOSIS — H53.8 BLURRED VISION: Primary | ICD-10-CM

## 2022-02-28 DIAGNOSIS — R51.9 DAILY HEADACHE: ICD-10-CM

## 2022-02-28 NOTE — TELEPHONE ENCOUNTER
Pt calling to inform that her insurance does not cover ophthalmologist placed on ordered referral  Pt found provider outside of network  Pt to call back with information  Isa - Please place generic referral to be faxed to out of network provider  Thank you!

## 2022-03-16 ENCOUNTER — HOSPITAL ENCOUNTER (OUTPATIENT)
Dept: MRI IMAGING | Facility: CLINIC | Age: 27
Discharge: HOME | End: 2022-03-16
Payer: COMMERCIAL

## 2022-03-16 DIAGNOSIS — H53.8 BLURRED VISION: ICD-10-CM

## 2022-03-16 DIAGNOSIS — R51.9 DAILY HEADACHE: ICD-10-CM

## 2022-03-16 PROCEDURE — G1004 CDSM NDSC: HCPCS

## 2022-03-16 PROCEDURE — 70551 MRI BRAIN STEM W/O DYE: CPT

## 2022-05-16 ENCOUNTER — TELEPHONE (OUTPATIENT)
Dept: FAMILY MEDICINE CLINIC | Facility: MEDICAL CENTER | Age: 27
End: 2022-05-16

## 2022-05-16 DIAGNOSIS — R51.9 HEADACHE, UNSPECIFIED: Primary | ICD-10-CM

## 2022-05-17 ENCOUNTER — ANNUAL EXAM (OUTPATIENT)
Dept: OBGYN CLINIC | Facility: CLINIC | Age: 27
End: 2022-05-17
Payer: COMMERCIAL

## 2022-05-17 VITALS
SYSTOLIC BLOOD PRESSURE: 110 MMHG | BODY MASS INDEX: 25.04 KG/M2 | HEIGHT: 68 IN | DIASTOLIC BLOOD PRESSURE: 74 MMHG | WEIGHT: 165.2 LBS

## 2022-05-17 DIAGNOSIS — Z11.3 SCREENING FOR STD (SEXUALLY TRANSMITTED DISEASE): ICD-10-CM

## 2022-05-17 DIAGNOSIS — N92.6 MISSED PERIOD: ICD-10-CM

## 2022-05-17 DIAGNOSIS — Z01.419 ENCOUNTER FOR GYNECOLOGICAL EXAMINATION: Primary | ICD-10-CM

## 2022-05-17 LAB — SL AMB POCT URINE HCG: NEGATIVE

## 2022-05-17 PROCEDURE — 81025 URINE PREGNANCY TEST: CPT | Performed by: STUDENT IN AN ORGANIZED HEALTH CARE EDUCATION/TRAINING PROGRAM

## 2022-05-17 PROCEDURE — 87491 CHLMYD TRACH DNA AMP PROBE: CPT | Performed by: STUDENT IN AN ORGANIZED HEALTH CARE EDUCATION/TRAINING PROGRAM

## 2022-05-17 PROCEDURE — 87591 N.GONORRHOEAE DNA AMP PROB: CPT | Performed by: STUDENT IN AN ORGANIZED HEALTH CARE EDUCATION/TRAINING PROGRAM

## 2022-05-17 PROCEDURE — 99395 PREV VISIT EST AGE 18-39: CPT | Performed by: STUDENT IN AN ORGANIZED HEALTH CARE EDUCATION/TRAINING PROGRAM

## 2022-05-17 PROCEDURE — G0145 SCR C/V CYTO,THINLAYER,RESCR: HCPCS | Performed by: STUDENT IN AN ORGANIZED HEALTH CARE EDUCATION/TRAINING PROGRAM

## 2022-05-17 PROCEDURE — 0503F POSTPARTUM CARE VISIT: CPT | Performed by: STUDENT IN AN ORGANIZED HEALTH CARE EDUCATION/TRAINING PROGRAM

## 2022-05-17 NOTE — PROGRESS NOTES
Assessment/Plan:    Encounter for gynecological examination  26 yo here for annual    Pap 2019 NILM  Collected today  S/p gardasil  Reviewed breast self awareness  Discussed healthy diet and exercise  Sexually active, desires STI screening  Plans to start POP  EPDS 0       Diagnoses and all orders for this visit:    Encounter for gynecological examination  -     Liquid-based pap, screening    Screening for STD (sexually transmitted disease)  -     Cancel: Chlamydia/GC amplified DNA by PCR  -     HIV 1/2 Antigen/Antibody (4th Generation) w Reflex [facility]; Future  -     Hepatitis B surface antigen; Future  -     Hepatitis C antibody; Future  -     RPR; Future  -     Chlamydia/GC amplified DNA by PCR    Missed period  -     POCT urine HCG          Subjective:      Patient ID: Drew Conte is a 27 y o  female  26 yo  here for annual exam  First since most recent delivery  Breastfeeding  Cycles resumed in March, not one in April  UPT negative  Sexually active  Desires comprehensvie STI screening  Discussed POP and breastfeeding  Her two boys are both doing well  The following portions of the patient's history were reviewed and updated as appropriate: allergies, current medications, past family history, past medical history, past social history, past surgical history and problem list     Review of Systems   Constitutional: Negative for chills and fever  HENT: Negative for ear pain and sore throat  Eyes: Negative for pain and visual disturbance  Respiratory: Negative for cough and shortness of breath  Cardiovascular: Negative for chest pain and palpitations  Gastrointestinal: Negative for abdominal pain, constipation, diarrhea, nausea and vomiting  Genitourinary: Negative for dyspareunia, dysuria, frequency, hematuria, pelvic pain, urgency, vaginal bleeding, vaginal discharge and vaginal pain  Musculoskeletal: Negative for arthralgias and back pain     Skin: Negative for color change and rash    Neurological: Negative for seizures and syncope  All other systems reviewed and are negative  Objective:      /74 (BP Location: Left arm, Patient Position: Sitting)   Ht 5' 8" (1 727 m)   Wt 74 9 kg (165 lb 3 2 oz)   LMP 03/19/2022 (Exact Date)   BMI 25 12 kg/m²          Physical Exam  Constitutional:       General: She is not in acute distress  Appearance: She is well-developed  She is not diaphoretic  HENT:      Head: Normocephalic and atraumatic  Neck:      Thyroid: No thyromegaly  Pulmonary:      Effort: Pulmonary effort is normal    Chest:   Breasts: Breasts are symmetrical       Right: No inverted nipple, mass, nipple discharge, skin change, tenderness, axillary adenopathy or supraclavicular adenopathy  Left: No inverted nipple, mass, nipple discharge, skin change, tenderness, axillary adenopathy or supraclavicular adenopathy  Abdominal:      General: There is no distension  Palpations: Abdomen is soft  There is no mass  Tenderness: There is no abdominal tenderness  There is no guarding or rebound  Genitourinary:     Exam position: Supine  Labia:         Right: No rash, tenderness, lesion or injury  Left: No rash, tenderness, lesion or injury  Vagina: Normal  No vaginal discharge, erythema, tenderness or bleeding  Cervix: No cervical motion tenderness, discharge or friability  Uterus: Not enlarged and not tender  Adnexa:         Right: No mass, tenderness or fullness  Left: No mass, tenderness or fullness  Musculoskeletal:      Cervical back: Normal range of motion and neck supple  Lymphadenopathy:      Cervical: No cervical adenopathy  Upper Body:      Right upper body: No supraclavicular, axillary or pectoral adenopathy  Left upper body: No supraclavicular, axillary or pectoral adenopathy

## 2022-05-17 NOTE — ASSESSMENT & PLAN NOTE
28 yo here for annual    Pap 5/2019 NILM  Collected today  S/p gardasil  Reviewed breast self awareness  Discussed healthy diet and exercise  Sexually active, desires STI screening  Plans to start POP    EPDS 0

## 2022-05-19 ENCOUNTER — TELEPHONE (OUTPATIENT)
Dept: OBGYN CLINIC | Facility: CLINIC | Age: 27
End: 2022-05-19

## 2022-05-19 LAB
C TRACH DNA SPEC QL NAA+PROBE: NEGATIVE
N GONORRHOEA DNA SPEC QL NAA+PROBE: NEGATIVE

## 2022-05-19 NOTE — TELEPHONE ENCOUNTER
Spoke to pt and relayed msg and recommendations from Ary Nguyen and if no improvements she will need an OV

## 2022-05-19 NOTE — TELEPHONE ENCOUNTER
Pt called saying than she has a vaginal itching and discomfort, no discharge nor odor  Pt last seen on 5/17/22 by Dr Teetee Abdalla  Please advise! Thanks!

## 2022-05-19 NOTE — TELEPHONE ENCOUNTER
Would try a couple of days of irritant avoidance- no sign of yeast on exam    Mild dove bar soap to wash  No washcloths  No shave or wax  Topical hydrocortisone ointment or cream twice daily as needed  Coconut oil for moisture    If no improvement would advise office visit given no improvement on OTC yeast medication

## 2022-05-19 NOTE — TELEPHONE ENCOUNTER
Pt has only external itching  Saw you 2 days ago  No dsch, no odor  She tried monistat and it did not help  Nystatin ? ? Diflucan? Would have pended it - not sure what you would use    Thanks

## 2022-05-20 ENCOUNTER — TELEPHONE (OUTPATIENT)
Dept: OBGYN CLINIC | Facility: CLINIC | Age: 27
End: 2022-05-20

## 2022-05-20 DIAGNOSIS — N89.8 VAGINAL DISCHARGE: Primary | ICD-10-CM

## 2022-05-20 RX ORDER — METRONIDAZOLE 500 MG/1
500 TABLET ORAL EVERY 12 HOURS SCHEDULED
Qty: 14 TABLET | Refills: 0 | Status: SHIPPED | OUTPATIENT
Start: 2022-05-20 | End: 2022-05-27

## 2022-05-20 NOTE — TELEPHONE ENCOUNTER
Called and spoke to pt informing that medication sent over   She is aware she will need OVS if sx dont improve after

## 2022-05-20 NOTE — TELEPHONE ENCOUNTER
Pt recent annual 5/17  Began yesterday with wht dis with fishy odor  Has had in the past   Bf  req rx  omar [facility]

## 2022-05-20 NOTE — TELEPHONE ENCOUNTER
Pt is having burning and white discharge   Wants to know if she can get something prescribed for it    Please advise

## 2022-05-24 LAB
LAB AP GYN PRIMARY INTERPRETATION: NORMAL
Lab: NORMAL
PATH INTERP SPEC-IMP: NORMAL

## 2022-06-17 ENCOUNTER — OFFICE VISIT (OUTPATIENT)
Dept: OBGYN CLINIC | Facility: CLINIC | Age: 27
End: 2022-06-17
Payer: COMMERCIAL

## 2022-06-17 VITALS
WEIGHT: 158.8 LBS | DIASTOLIC BLOOD PRESSURE: 70 MMHG | BODY MASS INDEX: 24.07 KG/M2 | SYSTOLIC BLOOD PRESSURE: 118 MMHG | HEIGHT: 68 IN

## 2022-06-17 DIAGNOSIS — B37.3 YEAST VAGINITIS: Primary | ICD-10-CM

## 2022-06-17 LAB
BV WHIFF TEST VAG QL: ABNORMAL
CLUE CELLS SPEC QL WET PREP: ABNORMAL
PH SMN: 4 [PH]
T VAGINALIS VAG QL WET PREP: ABNORMAL
YEAST VAG QL WET PREP: ABNORMAL

## 2022-06-17 PROCEDURE — 87210 SMEAR WET MOUNT SALINE/INK: CPT | Performed by: OBSTETRICS & GYNECOLOGY

## 2022-06-17 PROCEDURE — 99213 OFFICE O/P EST LOW 20 MIN: CPT | Performed by: OBSTETRICS & GYNECOLOGY

## 2022-06-17 PROCEDURE — 81514 NFCT DS BV&VAGINITIS DNA ALG: CPT | Performed by: OBSTETRICS & GYNECOLOGY

## 2022-06-17 RX ORDER — FLUCONAZOLE 150 MG/1
TABLET ORAL
Qty: 2 TABLET | Refills: 0 | Status: SHIPPED | OUTPATIENT
Start: 2022-06-17 | End: 2022-06-20

## 2022-06-17 NOTE — PROGRESS NOTES
Assessment/Plan:    No problem-specific Assessment & Plan notes found for this encounter  Diagnoses and all orders for this visit:    Yeast vaginitis  -     fluconazole (DIFLUCAN) 150 mg tablet; Take one today and 1 in 3 days  -     triamcinolone (KENALOG) 0 1 % ointment; Apply topically 2 (two) times a day  -     terconazole (TERAZOL 7) 0 4 % vaginal cream; Insert 1 applicator into the vagina daily at bedtime        Results for orders placed or performed in visit on 06/17/22   POCT wet mount   Result Value Ref Range    Yeast, Wet Prep Pos     pH 4 0     Whiff Test Neg     Clue Cells Neg     Trich, Wet Prep Neg          Subjective:      Patient ID: Valencia Mensah is a 27 y o  female  71-year-old female presents for complaints of vaginal itching irritation and discharge  Patient believe she has yeast infection patient did receive recent treatment for BV patient completed entire course of medication  Patient denies pelvic pain abnormal vaginal bleeding or odor or any other signs of infection  Patient recently delivered 12/04/2021,  baby is doing well      The following portions of the patient's history were reviewed and updated as appropriate: allergies, current medications, past family history, past medical history, past social history, past surgical history and problem list     Review of Systems   Constitutional: Negative for chills, fatigue and fever  Eyes: Negative for visual disturbance  Respiratory: Negative for cough and shortness of breath  Cardiovascular: Negative for chest pain  Gastrointestinal: Negative for abdominal pain  Genitourinary: Positive for vaginal discharge  Negative for vaginal bleeding  Objective:      /70 (BP Location: Left arm, Patient Position: Sitting, Cuff Size: Standard)   Ht 5' 8" (1 727 m)   Wt 72 kg (158 lb 12 8 oz)   Breastfeeding Yes   BMI 24 15 kg/m²          Physical Exam  Vitals and nursing note reviewed     Constitutional: Appearance: Normal appearance  She is normal weight  HENT:      Head: Normocephalic and atraumatic  Eyes:      Conjunctiva/sclera: Conjunctivae normal    Cardiovascular:      Rate and Rhythm: Normal rate  Pulmonary:      Effort: Pulmonary effort is normal    Genitourinary:     General: Normal vulva  Exam position: Lithotomy position  Jean stage (genital): 5  Labia:         Right: No rash, tenderness, lesion or injury  Left: No rash, tenderness, lesion or injury  Vagina: Vaginal discharge present  Cervix: Normal       Uterus: Normal        Adnexa: Right adnexa normal and left adnexa normal       Comments: White thick clumpy discharge  Musculoskeletal:         General: Normal range of motion  Cervical back: Normal range of motion  Lymphadenopathy:      Lower Body: No right inguinal adenopathy  No left inguinal adenopathy  Skin:     General: Skin is warm and dry  Neurological:      Mental Status: She is alert  Psychiatric:         Mood and Affect: Mood normal          Behavior: Behavior normal          Thought Content: Thought content normal          Judgment: Judgment normal        medication sent to the pharmacy reviewed instructions for use    Patient advised to start a probiotic over-the-counter daily  Perineal hygiene reviewed at length  Follow-up if symptoms have not resolved

## 2022-06-17 NOTE — ADDENDUM NOTE
Addended by: Maico Espinoza on: 6/17/2022 03:53 PM     Modules accepted: Orders From: Marlin Romero  To: Jen Thao 

## 2022-06-17 NOTE — PATIENT INSTRUCTIONS
Yeast Infection   AMBULATORY CARE:   A yeast infection , or vaginal candidiasis, is a common vaginal infection  A yeast infection is caused by a fungus, or yeast-like germ  Fungi are normally found in your vagina  Too many fungi can cause an infection  Common signs and symptoms: Thick, white, cheese-like discharge from your vagina    Itching, swelling, and redness in your vagina    Pain or burning when you urinate    Pain during sexual intercourse    Call your doctor or gynecologist if:   You have a fever and chills  You develop abdominal or pelvic pain  Your discharge is bloody and it is not your monthly period  Your signs and symptoms get worse, even after treatment  You have questions or concerns about your condition or care  Treatment for a yeast infection  includes medicines to treat the fungal infection and decrease inflammation  The medicine may be a pill, cream, ointment, or vaginal tablet or suppository  Keep your vagina healthy:   Clean your genital area with mild soap and warm water each day  Do not get soap inside your vagina  Gently dry the area after washing  Do not use hot tubs  The heat and moisture from hot tubs can increase your risk for another yeast infection  Always wipe from front to back  after you use the toilet  This prevents spreading bacteria from your rectal area into your vagina  Do not wear tight-fitting clothes or undergarments  for long periods of time  Wear cotton underwear during the day  Cotton helps keep your genital area dry and does not hold in warmth or moisture  Do not wear underwear at night  Do not douche  or use feminine hygiene sprays or bubble bath  Do not use pads or tampons that are scented, or colored or perfumed toilet paper  Do not have sex until your symptoms go away  Have your partner wear a condom until you complete your course of medication  Ask your healthcare provider about birth control options if necessary    Condoms have latex and diaphragms have gel that kills sperm  Both of these may irritate your genital area  Follow up with your doctor or gynecologist as directed:  Write down your questions so you remember to ask them during your visits  © Copyright [employer] 2022 Information is for End User's use only and may not be sold, redistributed or otherwise used for commercial purposes  All illustrations and images included in CareNotes® are the copyrighted property of A D A M , Inc  or Chelsea Rodriguez   The above information is an  only  It is not intended as medical advice for individual conditions or treatments  Talk to your doctor, nurse or pharmacist before following any medical regimen to see if it is safe and effective for you  Perineal Hygiene     No soaps or feminine wash to the vulva  Use only water to cleanse, or water with Dove or [employer] if necessary  No lotion to the area  Use only coconut oil for moisture if needed   No douching     Cotton underware, loose fitting clothing  Only perfume-free, dye-free laundry detergent, use a second rinse cycle   Avoid fabric softeners/dryer sheets  Coconut oil as a lubricant (if not using condoms) or another scent-free lubricant (Astroglide, Uberlube) if needed  Partner to avoid the same products as well  Over the counter probiotic to restore vaginal nehal may be helpful as well     You may also look into Boric Acid vaginal suppositories to restore vaginal PH balance for up to 2 weeks as directed on the box   You may not use these if you are pregnant

## 2022-06-18 LAB
C GLABRATA DNA VAG QL NAA+PROBE: NEGATIVE
C KRUSEI DNA VAG QL NAA+PROBE: NEGATIVE
CANDIDA SP 6 PNL VAG NAA+PROBE: NEGATIVE
T VAGINALIS DNA VAG QL NAA+PROBE: NEGATIVE
VAGINOSIS/ITIS DNA PNL VAG PROBE+SIG AMP: NEGATIVE

## 2022-06-20 ENCOUNTER — TELEPHONE (OUTPATIENT)
Dept: OBGYN CLINIC | Facility: CLINIC | Age: 27
End: 2022-06-20

## 2022-06-20 NOTE — TELEPHONE ENCOUNTER
vm left 8:44 am:  6/17 Triconozole is not covered; cortrimizole (vagifem) or Monistat 7 is covered  Send in new script for this or do prior auth for rx that was ordered     For prior auth call [facility] [phone]

## 2022-06-22 DIAGNOSIS — B37.9 YEAST INFECTION: Primary | ICD-10-CM

## 2022-08-26 DIAGNOSIS — Z30.09 BIRTH CONTROL COUNSELING: ICD-10-CM

## 2022-08-26 RX ORDER — ACETAMINOPHEN AND CODEINE PHOSPHATE 120; 12 MG/5ML; MG/5ML
1 SOLUTION ORAL DAILY
Qty: 90 TABLET | Refills: 2 | Status: SHIPPED | OUTPATIENT
Start: 2022-08-26

## 2022-10-07 ENCOUNTER — TELEPHONE (OUTPATIENT)
Dept: NEUROLOGY | Facility: CLINIC | Age: 27
End: 2022-10-07

## 2022-10-07 NOTE — TELEPHONE ENCOUNTER
Patient informed me she cannot travel to [facility] to see [facility], so I offered her a virtual appointment with dileep on 10/25/22 at 7:30am patient confirmed  Please schedule F/U appointment with a provider closer to patient's home

## 2022-10-11 PROBLEM — Z01.419 ENCOUNTER FOR GYNECOLOGICAL EXAMINATION: Status: RESOLVED | Noted: 2022-05-17 | Resolved: 2022-10-11

## 2022-10-18 ENCOUNTER — IMMUNIZATIONS (OUTPATIENT)
Dept: FAMILY MEDICINE CLINIC | Facility: MEDICAL CENTER | Age: 27
End: 2022-10-18
Payer: COMMERCIAL

## 2022-10-18 DIAGNOSIS — Z23 ENCOUNTER FOR IMMUNIZATION: Primary | ICD-10-CM

## 2022-10-18 PROCEDURE — 90471 IMMUNIZATION ADMIN: CPT

## 2022-10-18 PROCEDURE — 90686 IIV4 VACC NO PRSV 0.5 ML IM: CPT

## 2022-10-31 ENCOUNTER — APPOINTMENT (OUTPATIENT)
Dept: LAB | Facility: CLINIC | Age: 27
End: 2022-10-31

## 2022-10-31 DIAGNOSIS — Z11.3 SCREENING FOR STD (SEXUALLY TRANSMITTED DISEASE): ICD-10-CM

## 2022-11-01 LAB
HBV SURFACE AG SER QL: NORMAL
HCV AB SER QL: NORMAL
HIV 1+2 AB+HIV1 P24 AG SERPL QL IA: NORMAL
RPR SER QL: NORMAL

## 2023-01-18 ENCOUNTER — OFFICE VISIT (OUTPATIENT)
Dept: FAMILY MEDICINE CLINIC | Facility: MEDICAL CENTER | Age: 28
End: 2023-01-18

## 2023-01-18 VITALS
SYSTOLIC BLOOD PRESSURE: 116 MMHG | RESPIRATION RATE: 16 BRPM | HEIGHT: 68 IN | DIASTOLIC BLOOD PRESSURE: 64 MMHG | TEMPERATURE: 98.7 F | HEART RATE: 76 BPM | OXYGEN SATURATION: 98 % | BODY MASS INDEX: 22.43 KG/M2 | WEIGHT: 148 LBS

## 2023-01-18 DIAGNOSIS — Z91.030 BEE STING ALLERGY: ICD-10-CM

## 2023-01-18 DIAGNOSIS — Z23 ENCOUNTER FOR IMMUNIZATION: ICD-10-CM

## 2023-01-18 DIAGNOSIS — Z00.00 ANNUAL PHYSICAL EXAM: Primary | ICD-10-CM

## 2023-01-18 DIAGNOSIS — Z11.3 SCREEN FOR STD (SEXUALLY TRANSMITTED DISEASE): ICD-10-CM

## 2023-01-18 DIAGNOSIS — Z13.29 THYROID DISORDER SCREEN: ICD-10-CM

## 2023-01-18 DIAGNOSIS — Z13.220 LIPID SCREENING: ICD-10-CM

## 2023-01-18 DIAGNOSIS — J45.20 MILD INTERMITTENT ASTHMA WITHOUT COMPLICATION: ICD-10-CM

## 2023-01-18 DIAGNOSIS — J30.89 ENVIRONMENTAL AND SEASONAL ALLERGIES: ICD-10-CM

## 2023-01-18 RX ORDER — EPINEPHRINE 0.3 MG/.3ML
0.3 INJECTION SUBCUTANEOUS ONCE
Qty: 2 EACH | Refills: 0 | Status: SHIPPED | OUTPATIENT
Start: 2023-01-18 | End: 2023-01-18

## 2023-01-18 RX ORDER — SERTRALINE HYDROCHLORIDE 25 MG/1
37.5 TABLET, FILM COATED ORAL DAILY
Qty: 135 TABLET | Refills: 1 | Status: SHIPPED | OUTPATIENT
Start: 2023-01-18

## 2023-01-18 RX ORDER — ALBUTEROL SULFATE 90 UG/1
2 POWDER, METERED RESPIRATORY (INHALATION) AS NEEDED
Qty: 1 EACH | Refills: 1 | Status: SHIPPED | OUTPATIENT
Start: 2023-01-18
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
## 2023-01-27 DIAGNOSIS — Z30.09 BIRTH CONTROL COUNSELING: ICD-10-CM

## 2023-01-27 RX ORDER — ACETAMINOPHEN AND CODEINE PHOSPHATE 120; 12 MG/5ML; MG/5ML
1 SOLUTION ORAL DAILY
Qty: 90 TABLET | Refills: 1 | Status: SHIPPED | OUTPATIENT
Start: 2023-01-27

## 2023-01-31 ENCOUNTER — TELEPHONE (OUTPATIENT)
Dept: OBGYN CLINIC | Facility: CLINIC | Age: 28
End: 2023-01-31

## 2023-01-31 DIAGNOSIS — N89.8 VAGINAL ITCHING: Primary | ICD-10-CM

## 2023-01-31 RX ORDER — TRIAMCINOLONE ACETONIDE 1 MG/G
OINTMENT TOPICAL 2 TIMES DAILY
Qty: 30 G | Refills: 0 | Status: SHIPPED | OUTPATIENT
Start: 2023-01-31 | End: 2023-05-12 | Stop reason: SDUPTHER

## 2023-01-31 NOTE — PROGRESS NOTES
Pt called in for scrip for Triamcinolone ointment for vaginal itching, I have prescribed this in the past for her, if no resolution of symptoms will need OV

## 2023-01-31 NOTE — TELEPHONE ENCOUNTER
Pt is asking for a refill on triamcinolone cream for external itching, she said she had this in the past

## 2023-02-06 ENCOUNTER — TELEPHONE (OUTPATIENT)
Dept: FAMILY MEDICINE CLINIC | Facility: MEDICAL CENTER | Age: 28
End: 2023-02-06

## 2023-02-06 NOTE — TELEPHONE ENCOUNTER
Pt went to UC on 2/4 (see note) and said she's still not feeling well  Covid and flu was negative  Pt is taking Tylenol cold and sinus

## 2023-02-07 NOTE — TELEPHONE ENCOUNTER
Patient called back- she is feeling a little better today bu not 100%  Dx: with URI at the urgent Care   Discussed viral precautions , how long they can last, Otc's cough and cold medication suggested  We can surly see her if symptoms persist more then 7-10 days r symptoms worsen

## 2023-02-15 ENCOUNTER — TELEPHONE (OUTPATIENT)
Dept: FAMILY MEDICINE CLINIC | Facility: MEDICAL CENTER | Age: 28
End: 2023-02-15

## 2023-02-15 NOTE — TELEPHONE ENCOUNTER
Pt stopped by office to request a letter for her employer  They are having a rise in the number of covid cases and pt is not vaccinated  She needs a letter stating that she is not vaccinated so she is not placed on a covid floor  Please email to pt when letter is ready      Routed to [employer]

## 2023-02-16 NOTE — TELEPHONE ENCOUNTER
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

## 2023-03-09 NOTE — TELEPHONE ENCOUNTER
Best contact number for MEL:[phone]    Emergency Contact name and number: Yocasta Garrett [phone]    Referring provider and telephone number:    Primary Care Provider Name and if affiliated with [facility]:  Tj Thompson     Reason for Appointment/Dx: bell's palsy     Have you seen and followed up with a pediatric Neurologist for this disease in the past?  No (If yes ok to schedule with Dr Gregg Perez)    Neurology Location patient would like to be seen: [place]     Order received? Yes                                                 Records Received? Yes    Have you ever seen another Neurologist?       No    Insurance [facility]   ID/Policy #:    Secondary Insurance:    ID/Policy#: Workman's Comp/ Accident/ School  Information      Workman's Comp/Accident/School related?        No    If yes name of Insurance company:    Date of Injury:    Type of Injury:    [address] [facility] Name and Telephone Number:    Notes:                   Appointment date: 06/21/2021
25

## 2023-03-17 PROBLEM — N94.6 DYSMENORRHEA: Status: ACTIVE | Noted: 2023-02-04

## 2023-03-22 ENCOUNTER — OFFICE VISIT (OUTPATIENT)
Dept: OBGYN CLINIC | Age: 28
End: 2023-03-22

## 2023-03-22 VITALS
DIASTOLIC BLOOD PRESSURE: 72 MMHG | SYSTOLIC BLOOD PRESSURE: 108 MMHG | BODY MASS INDEX: 22.88 KG/M2 | HEIGHT: 68 IN | WEIGHT: 151 LBS

## 2023-03-22 DIAGNOSIS — Z33.2 ABORTION IN FIRST TRIMESTER: Primary | ICD-10-CM

## 2023-03-22 NOTE — ASSESSMENT & PLAN NOTE
-advised patient to expect bleeding for 1-2 weeks after misoprostol administration  -menstrual cycle should resume at 6-8 weeks  -follow up US to confirm no retained products    -call with any signs or symptoms of infection (fevers, chills, abdominal pain, abnormal discharge)  -continue BC

## 2023-03-22 NOTE — PROGRESS NOTES
Following up for  on 3/15/23 at [facility]  She was 5w3d  She is unsure of the name of the medication she was given for the first pill but states she "took 1 pill on 3/15/23 and then 4 pills of Misoprostol on 3/16/23  Still currently bleeding but denies any abdominal or vaginal pain  She does take OCP's but states she missed the time she would usually take the OCP's and took it hours later when she remembered

## 2023-03-22 NOTE — PATIENT INSTRUCTIONS
Miscarriage   WHAT YOU NEED TO KNOW:   A miscarriage is the loss of a fetus within the first 20 weeks of pregnancy  A miscarriage may also be called a spontaneous  or an early pregnancy loss  DISCHARGE INSTRUCTIONS:   Return to the emergency department if:   You have foul-smelling drainage or pus coming from your vagina  You have heavy vaginal bleeding and soak 1 pad or more in an hour  You have severe abdominal pain  You feel like your heart is beating faster than normal     You feel extremely weak or dizzy  Contact your healthcare provider if:   You have a fever greater than 100 4°F or chills  You have extreme sadness, grief, or feel unable to cope with what has happened  You have questions or concerns about your condition or care  Self-care:   Do not put anything in your vagina for 2 weeks or as directed  Do not use tampons, douche, or have sex  These actions can cause infection and pain  Use sanitary pads as needed  You may have light bleeding or spotting for 2 weeks  Do not take a bath or go swimming for 2 weeks or as directed  These actions may increase your risk for an infection  Take showers only  Rest as needed  Slowly start to do more each day  Return to your daily activities as directed  Talk to your healthcare provider about birth control  If you would like to prevent another pregnancy, ask your healthcare provider which type of birth control is best for you  Join a support group or therapy to help you cope  A miscarriage may be very difficult for you, your partner, and other members of your family  There is no right way to feel after a miscarriage  You may feel overwhelming grief or other emotions  It may be helpful to talk to a friend, family member, or counselor about your feelings  You may worry that you could have another miscarriage  Talk to your healthcare provider about your concerns   Your provider may be able to help you reduce the risk for another miscarriage  Your provider may also help you find ways to cope with grief  For more information:   The [address] [place] [address] of Obstetricians and Gynecologists  [address] [address] , [address] [place] [address]  Phone: [phone]  Phone: [phone]  Web Address: http://Fixya/  [employer]    [facility]  [address] [facility] , [address] [place] [address]  Web Address: [employer]  Follow up with your healthcare provider as directed: You may need to see your healthcare provider for blood tests or an ultrasound  Write down your questions so you remember to ask them during your visits  © Copyright Sloan Paige 2022 Information is for End User's use only and may not be sold, redistributed or otherwise used for commercial purposes  The above information is an  only  It is not intended as medical advice for individual conditions or treatments  Talk to your doctor, nurse or pharmacist before following any medical regimen to see if it is safe and effective for you
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
## 2023-04-03 ENCOUNTER — APPOINTMENT (OUTPATIENT)
Dept: LAB | Facility: CLINIC | Age: 28
End: 2023-04-03

## 2023-04-03 DIAGNOSIS — Z13.220 LIPID SCREENING: ICD-10-CM

## 2023-04-03 DIAGNOSIS — Z11.3 SCREEN FOR STD (SEXUALLY TRANSMITTED DISEASE): ICD-10-CM

## 2023-04-03 DIAGNOSIS — Z00.00 ANNUAL PHYSICAL EXAM: ICD-10-CM

## 2023-04-03 DIAGNOSIS — Z13.29 THYROID DISORDER SCREEN: ICD-10-CM

## 2023-04-03 LAB
ALBUMIN SERPL BCP-MCNC: 3.8 G/DL (ref 3.5–5)
ALP SERPL-CCNC: 53 U/L (ref 46–116)
ALT SERPL W P-5'-P-CCNC: 21 U/L (ref 12–78)
ANION GAP SERPL CALCULATED.3IONS-SCNC: 3 MMOL/L (ref 4–13)
AST SERPL W P-5'-P-CCNC: 22 U/L (ref 5–45)
BASOPHILS # BLD AUTO: 0.05 THOUSANDS/ÂΜL (ref 0–0.1)
BASOPHILS NFR BLD AUTO: 1 % (ref 0–1)
BILIRUB SERPL-MCNC: 0.94 MG/DL (ref 0.2–1)
BUN SERPL-MCNC: 7 MG/DL (ref 5–25)
CALCIUM SERPL-MCNC: 8.9 MG/DL (ref 8.3–10.1)
CHLORIDE SERPL-SCNC: 104 MMOL/L (ref 96–108)
CHOLEST SERPL-MCNC: 129 MG/DL
CO2 SERPL-SCNC: 27 MMOL/L (ref 21–32)
CREAT SERPL-MCNC: 0.83 MG/DL (ref 0.6–1.3)
EOSINOPHIL # BLD AUTO: 0.05 THOUSAND/ÂΜL (ref 0–0.61)
EOSINOPHIL NFR BLD AUTO: 1 % (ref 0–6)
ERYTHROCYTE [DISTWIDTH] IN BLOOD BY AUTOMATED COUNT: 14.2 % (ref 11.6–15.1)
GFR SERPL CREATININE-BSD FRML MDRD: 98 ML/MIN/1.73SQ M
GLUCOSE P FAST SERPL-MCNC: 90 MG/DL (ref 65–99)
HCT VFR BLD AUTO: 40.1 % (ref 34.8–46.1)
HDLC SERPL-MCNC: 57 MG/DL
HGB BLD-MCNC: 13.3 G/DL (ref 11.5–15.4)
IMM GRANULOCYTES # BLD AUTO: 0.01 THOUSAND/UL (ref 0–0.2)
IMM GRANULOCYTES NFR BLD AUTO: 0 % (ref 0–2)
LDLC SERPL CALC-MCNC: 65 MG/DL (ref 0–100)
LYMPHOCYTES # BLD AUTO: 1.53 THOUSANDS/ÂΜL (ref 0.6–4.47)
LYMPHOCYTES NFR BLD AUTO: 38 % (ref 14–44)
MCH RBC QN AUTO: 29.7 PG (ref 26.8–34.3)
MCHC RBC AUTO-ENTMCNC: 33.2 G/DL (ref 31.4–37.4)
MCV RBC AUTO: 90 FL (ref 82–98)
MONOCYTES # BLD AUTO: 0.41 THOUSAND/ÂΜL (ref 0.17–1.22)
MONOCYTES NFR BLD AUTO: 10 % (ref 4–12)
NEUTROPHILS # BLD AUTO: 1.96 THOUSANDS/ÂΜL (ref 1.85–7.62)
NEUTS SEG NFR BLD AUTO: 50 % (ref 43–75)
NONHDLC SERPL-MCNC: 72 MG/DL
NRBC BLD AUTO-RTO: 0 /100 WBCS
PLATELET # BLD AUTO: 247 THOUSANDS/UL (ref 149–390)
PMV BLD AUTO: 10.8 FL (ref 8.9–12.7)
POTASSIUM SERPL-SCNC: 3.4 MMOL/L (ref 3.5–5.3)
PROT SERPL-MCNC: 7.4 G/DL (ref 6.4–8.4)
RBC # BLD AUTO: 4.48 MILLION/UL (ref 3.81–5.12)
SODIUM SERPL-SCNC: 134 MMOL/L (ref 135–147)
TRIGL SERPL-MCNC: 37 MG/DL
TSH SERPL DL<=0.05 MIU/L-ACNC: 0.83 UIU/ML (ref 0.45–4.5)
WBC # BLD AUTO: 4.01 THOUSAND/UL (ref 4.31–10.16)

## 2023-04-04 LAB
C TRACH DNA SPEC QL NAA+PROBE: NEGATIVE
HBV SURFACE AG SER QL: NORMAL
HCV AB SER QL: NORMAL
HIV 1+2 AB+HIV1 P24 AG SERPL QL IA: NORMAL
HIV 2 AB SERPL QL IA: NORMAL
HIV1 AB SERPL QL IA: NORMAL
HIV1 P24 AG SERPL QL IA: NORMAL
N GONORRHOEA DNA SPEC QL NAA+PROBE: NEGATIVE
TREPONEMA PALLIDUM IGG+IGM AB [PRESENCE] IN SERUM OR PLASMA BY IMMUNOASSAY: NORMAL

## 2023-05-05 ENCOUNTER — OFFICE VISIT (OUTPATIENT)
Dept: OBGYN CLINIC | Facility: CLINIC | Age: 28
End: 2023-05-05

## 2023-05-05 VITALS
DIASTOLIC BLOOD PRESSURE: 72 MMHG | HEIGHT: 68 IN | BODY MASS INDEX: 21.86 KG/M2 | SYSTOLIC BLOOD PRESSURE: 104 MMHG | WEIGHT: 144.2 LBS

## 2023-05-05 DIAGNOSIS — B37.9 YEAST INFECTION: Primary | ICD-10-CM

## 2023-05-05 RX ORDER — CETIRIZINE HYDROCHLORIDE 10 MG/1
10 TABLET ORAL DAILY
COMMUNITY
Start: 2023-04-30

## 2023-05-05 RX ORDER — FLUCONAZOLE 150 MG/1
150 TABLET ORAL ONCE
Qty: 2 TABLET | Refills: 0 | Status: SHIPPED | OUTPATIENT
Start: 2023-05-05 | End: 2023-05-05

## 2023-05-05 NOTE — PATIENT INSTRUCTIONS
Yeast Infection   AMBULATORY CARE:   A yeast infection , or vaginal candidiasis, is a common vaginal infection  A yeast infection is caused by a fungus, or yeast-like germ  Fungi are normally found in your vagina  Too many fungi can cause an infection  Common signs and symptoms: Thick, white, cheese-like discharge from your vagina    Itching, swelling, and redness in your vagina    Pain or burning when you urinate    Pain during sexual intercourse    Call your doctor or gynecologist if:   You have a fever and chills  You develop abdominal or pelvic pain  Your discharge is bloody and it is not your monthly period  Your signs and symptoms get worse, even after treatment  You have questions or concerns about your condition or care  Treatment for a yeast infection  includes medicines to treat the fungal infection and decrease inflammation  The medicine may be a pill, cream, ointment, or vaginal tablet or suppository  Keep your vagina healthy:   Clean your genital area with mild soap and warm water each day  Do not get soap inside your vagina  Gently dry the area after washing  Do not use hot tubs  The heat and moisture from hot tubs can increase your risk for another yeast infection  Always wipe from front to back  after you use the toilet  This prevents spreading bacteria from your rectal area into your vagina  Do not wear tight-fitting clothes or undergarments  for long periods of time  Wear cotton underwear during the day  Cotton helps keep your genital area dry and does not hold in warmth or moisture  Do not wear underwear at night  Do not douche  or use feminine hygiene sprays or bubble bath  Do not use pads or tampons that are scented, or colored or perfumed toilet paper  Do not have sex until your symptoms go away  Have your partner wear a condom until you complete your course of medication  Ask your healthcare provider about birth control options if necessary    Condoms have latex and diaphragms have gel that kills sperm  Both of these may irritate your genital area  Follow up with your doctor or gynecologist as directed:  Write down your questions so you remember to ask them during your visits  © Copyright Luis Daniel Romero 2022 Information is for End User's use only and may not be sold, redistributed or otherwise used for commercial purposes  The above information is an  only  It is not intended as medical advice for individual conditions or treatments  Talk to your doctor, nurse or pharmacist before following any medical regimen to see if it is safe and effective for you

## 2023-05-05 NOTE — ASSESSMENT & PLAN NOTE
-wet mount + hyphae and budding hyphae  pH 5 0  reviewed perineal hygiene    -call if symptoms do not resolve or worsen
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
## 2023-05-10 ENCOUNTER — TELEPHONE (OUTPATIENT)
Dept: OBGYN CLINIC | Facility: CLINIC | Age: 28
End: 2023-05-10

## 2023-05-10 NOTE — TELEPHONE ENCOUNTER
Pt said diflucan no help  Can you please order med for bv   I was not sure if I should pend oral med or cream - sorconstantine,  Thank you

## 2023-05-10 NOTE — TELEPHONE ENCOUNTER
Pt not having relief with current medication - req medication for BV that she and Roseanne discussed at the last appt    She is also requesting the cream to be reordered

## 2023-05-12 DIAGNOSIS — N76.0 BV (BACTERIAL VAGINOSIS): Primary | ICD-10-CM

## 2023-05-12 DIAGNOSIS — B96.89 BV (BACTERIAL VAGINOSIS): Primary | ICD-10-CM

## 2023-05-12 DIAGNOSIS — N89.8 VAGINAL ITCHING: ICD-10-CM

## 2023-05-12 RX ORDER — METRONIDAZOLE 7.5 MG/G
1 GEL VAGINAL
Qty: 70 G | Refills: 0 | Status: SHIPPED | OUTPATIENT
Start: 2023-05-12 | End: 2023-05-17

## 2023-05-19 ENCOUNTER — OFFICE VISIT (OUTPATIENT)
Dept: FAMILY MEDICINE CLINIC | Facility: CLINIC | Age: 28
End: 2023-05-19

## 2023-05-19 VITALS
HEART RATE: 64 BPM | HEIGHT: 68 IN | WEIGHT: 146.2 LBS | SYSTOLIC BLOOD PRESSURE: 100 MMHG | BODY MASS INDEX: 22.16 KG/M2 | DIASTOLIC BLOOD PRESSURE: 72 MMHG | TEMPERATURE: 99.2 F | OXYGEN SATURATION: 98 %

## 2023-05-19 DIAGNOSIS — J30.89 ENVIRONMENTAL AND SEASONAL ALLERGIES: ICD-10-CM

## 2023-05-19 DIAGNOSIS — J45.20 MILD INTERMITTENT ASTHMA WITHOUT COMPLICATION: ICD-10-CM

## 2023-05-19 DIAGNOSIS — Z76.89 ESTABLISHING CARE WITH NEW DOCTOR, ENCOUNTER FOR: Primary | ICD-10-CM

## 2023-05-19 RX ORDER — SULFAMETHOXAZOLE AND TRIMETHOPRIM 800; 160 MG/1; MG/1
1 TABLET ORAL 2 TIMES DAILY
COMMUNITY
Start: 2023-05-16

## 2023-05-19 NOTE — PROGRESS NOTES
Name: Jesenia Mendiola      : 1995      MRN: [id number]  Encounter Provider: JONAS Rocha  Encounter Date: 2023   Encounter department: [facility] [address] [place] [address]     1  Establishing care with new doctor, encounter for    2  Mild intermittent asthma without complication  Assessment & Plan:  Stable  Denies exacerbation of symptoms or recent hospitalizations  On albuterol as needed  3  Environmental and seasonal allergies  Assessment & Plan:  Was taking Claritin, but now better controlled with Zyrtec 10 mg  To continue on current medications as prescribed  4  Postpartum depression  Assessment & Plan:  Stable  Denies recent exacerbations/worsening of symptoms  To continue on current dose of sertraline  Subjective      Patient presents to the office for establishment of care  Was previously seen at [facility]  Last well visit was in 2023  Patient is established with [facility], screenings are all current  Patient denies tobacco, drug use  + occasional ETOH use  Currently in nursing school  Has 2 children: ages 11 and 1  Patient currently denies any complaints at this time  Review of Systems   Constitutional: Negative for activity change, appetite change and fever  HENT: Negative for congestion, ear pain, sinus pressure, sinus pain, sore throat and trouble swallowing  Eyes: Negative for photophobia, pain and visual disturbance  Respiratory: Negative for cough, chest tightness and shortness of breath  Cardiovascular: Negative for chest pain and palpitations  Gastrointestinal: Negative for abdominal pain, constipation, diarrhea and vomiting  Endocrine: Negative for polydipsia, polyphagia and polyuria  Genitourinary: Negative for decreased urine volume, dysuria, hematuria and urgency  Musculoskeletal: Negative for arthralgias, back pain and myalgias     Skin: Negative for "color change and rash  Allergic/Immunologic: Positive for environmental allergies  Neurological: Negative for dizziness, syncope, weakness, light-headedness and headaches  Hematological: Negative for adenopathy  Does not bruise/bleed easily  Psychiatric/Behavioral: Negative for confusion, dysphoric mood and self-injury  The patient is not nervous/anxious  All other systems reviewed and are negative  Current Outpatient Medications on File Prior to Visit   Medication Sig   • Albuterol Sulfate (ProAir RespiClick) 219 (90 Base) MCG/ACT AEPB Inhale 2 puffs as needed (sob/wheezing)   • cetirizine (ZyrTEC) 10 mg tablet Take 10 mg by mouth daily   • EPINEPHrine (EPIPEN) 0 3 mg/0 3 mL SOAJ Inject 0 3 mL (0 3 mg total) into a muscle once for 1 dose   • fluticasone (FLONASE) 50 mcg/act nasal spray instill 2 sprays into each nostril daily   • norethindrone (Dominique) 0 35 MG tablet Take 1 tablet (0 35 mg total) by mouth daily   • sertraline (ZOLOFT) 25 mg tablet Take 1 5 tablets (37 5 mg total) by mouth daily   • sulfamethoxazole-trimethoprim (BACTRIM DS) 800-160 mg per tablet Take 1 tablet by mouth 2 (two) times a day   • triamcinolone (KENALOG) 0 1 % ointment Apply topically 2 (two) times a day       Objective     /72 (BP Location: Left arm, Patient Position: Sitting, Cuff Size: Standard)   Pulse 64   Temp 99 2 °F (37 3 °C) (Tympanic)   Ht 5' 8\" (1 727 m)   Wt 66 3 kg (146 lb 3 2 oz)   LMP 04/22/2023 (Exact Date)   SpO2 98%   BMI 22 23 kg/m²     Physical Exam  Nursing note reviewed  Constitutional:       General: She is not in acute distress  Appearance: Normal appearance  She is not ill-appearing  HENT:      Head: Normocephalic and atraumatic        Right Ear: Tympanic membrane, ear canal and external ear normal       Left Ear: Tympanic membrane, ear canal and external ear normal       Nose: Nose normal       Mouth/Throat:      Mouth: Mucous membranes are moist       Pharynx: Oropharynx is " clear    Eyes:      Conjunctiva/sclera: Conjunctivae normal       Pupils: Pupils are equal, round, and reactive to light  Cardiovascular:      Rate and Rhythm: Normal rate and regular rhythm  Pulses: Normal pulses  Heart sounds: Normal heart sounds  No murmur heard  Pulmonary:      Effort: Pulmonary effort is normal  No respiratory distress  Breath sounds: Normal breath sounds  Abdominal:      General: Bowel sounds are normal       Palpations: Abdomen is soft  There is no mass  Tenderness: There is no abdominal tenderness  Musculoskeletal:         General: Normal range of motion  Cervical back: Normal range of motion and neck supple  No muscular tenderness  Right lower leg: No edema  Left lower leg: No edema  Lymphadenopathy:      Cervical: No cervical adenopathy  Skin:     General: Skin is warm and dry  Capillary Refill: Capillary refill takes less than 2 seconds  Neurological:      General: No focal deficit present  Mental Status: She is alert and oriented to person, place, and time  Motor: No weakness     Psychiatric:         Mood and Affect: Mood normal          Behavior: Behavior normal        JONAS Carpio

## 2023-05-19 NOTE — ASSESSMENT & PLAN NOTE
Was taking Claritin, but now better controlled with Zyrtec 10 mg  To continue on current medications as prescribed

## 2023-05-19 NOTE — ASSESSMENT & PLAN NOTE
Stable  Denies recent exacerbations/worsening of symptoms  To continue on current dose of sertraline

## 2023-05-27 ENCOUNTER — HOSPITAL ENCOUNTER (OUTPATIENT)
Dept: ULTRASOUND IMAGING | Facility: HOSPITAL | Age: 28
Discharge: HOME | End: 2023-05-27

## 2023-05-27 DIAGNOSIS — Z33.2 ABORTION IN FIRST TRIMESTER: ICD-10-CM

## 2023-06-13 ENCOUNTER — TELEPHONE (OUTPATIENT)
Age: 28
End: 2023-06-13

## 2023-06-13 DIAGNOSIS — N39.0 URINARY TRACT INFECTION WITHOUT HEMATURIA, SITE UNSPECIFIED: Primary | ICD-10-CM

## 2023-06-13 NOTE — TELEPHONE ENCOUNTER
Patient came into the office stating she was told that a urinalysis script would be placed in her chart  The patient went to the lab, no script in chart  Please advise

## 2023-06-13 NOTE — TELEPHONE ENCOUNTER
"l to patient to cb    Per prior encounter patient wrote in 6/2 she was on bactrim for a \"kidney infection\" for 10 days- but that was written as a historical provider-5/16- I dont know who prescribed that      metrogel was actually put for BV put in by Roseanne on 5/12    Asked if she was still having issues in [facility]- no reply from patient-    See no notes about repeat urine to be done or recnet urine in her labs saying she had any infection- who diagnosis  diagnostic the kidney infection she was tx for <2wks ago?   "

## 2023-06-13 NOTE — TELEPHONE ENCOUNTER
Message sent on test result of US by Roseanne- Urinalysis was suggested but not sure what happened to the order- spoke with roseanne she placed a new order for UA- advised patient to get it done, but further work up for kidney infection or any other issues she needs to follow up with her PCP- patient verbalized understanding

## 2023-06-20 ENCOUNTER — APPOINTMENT (OUTPATIENT)
Age: 28
End: 2023-06-20
Payer: COMMERCIAL

## 2023-06-20 DIAGNOSIS — N39.0 URINARY TRACT INFECTION WITHOUT HEMATURIA, SITE UNSPECIFIED: ICD-10-CM

## 2023-06-20 LAB
BACTERIA UR QL AUTO: ABNORMAL /HPF
BILIRUB UR QL STRIP: NEGATIVE
CLARITY UR: CLEAR
COLOR UR: ABNORMAL
GLUCOSE UR STRIP-MCNC: NEGATIVE MG/DL
HGB UR QL STRIP.AUTO: NEGATIVE
KETONES UR STRIP-MCNC: NEGATIVE MG/DL
LEUKOCYTE ESTERASE UR QL STRIP: NEGATIVE
MUCOUS THREADS UR QL AUTO: ABNORMAL
NITRITE UR QL STRIP: NEGATIVE
NON-SQ EPI CELLS URNS QL MICRO: ABNORMAL /HPF
PH UR STRIP.AUTO: 6.5 [PH]
PROT UR STRIP-MCNC: ABNORMAL MG/DL
RBC #/AREA URNS AUTO: ABNORMAL /HPF
SP GR UR STRIP.AUTO: 1.02 (ref 1–1.03)
UROBILINOGEN UR STRIP-ACNC: <2 MG/DL
WBC #/AREA URNS AUTO: ABNORMAL /HPF

## 2023-06-20 PROCEDURE — 81001 URINALYSIS AUTO W/SCOPE: CPT

## 2023-06-20 PROCEDURE — 87086 URINE CULTURE/COLONY COUNT: CPT

## 2023-06-21 LAB — BACTERIA UR CULT: NORMAL

## 2023-08-25 ENCOUNTER — OFFICE VISIT (OUTPATIENT)
Age: 28
End: 2023-08-25
Payer: COMMERCIAL

## 2023-08-25 VITALS
RESPIRATION RATE: 18 BRPM | TEMPERATURE: 97.9 F | BODY MASS INDEX: 22.43 KG/M2 | HEIGHT: 68 IN | WEIGHT: 148 LBS | HEART RATE: 62 BPM | SYSTOLIC BLOOD PRESSURE: 110 MMHG | OXYGEN SATURATION: 98 % | DIASTOLIC BLOOD PRESSURE: 64 MMHG

## 2023-08-25 DIAGNOSIS — U07.1 COVID-19: Primary | ICD-10-CM

## 2023-08-25 PROCEDURE — 99213 OFFICE O/P EST LOW 20 MIN: CPT

## 2023-08-25 RX ORDER — AZELASTINE HYDROCHLORIDE 0.5 MG/ML
SOLUTION/ DROPS OPHTHALMIC
COMMUNITY
Start: 2023-06-09

## 2023-08-25 NOTE — PATIENT INSTRUCTIONS
Symptoms of a viral infection may linger for up to 10 days. Your contagious period is the first 5-7 days of symptom onset. Continue over-the-counter products for symptoms: tylenol for fevers, ibuprofen for body aches, flonase (fluticasone) with nasal saline and sudafed for nasal congestion, mucinex for cough, and airborne/emergen-c for vitamin supplementation. Follow-up with PCP in 3-5 days if no improvement of symptoms. Report to ER if symptoms worsen or develop shortness of breath.
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
## 2023-08-28 ENCOUNTER — ANNUAL EXAM (OUTPATIENT)
Age: 28
End: 2023-08-28
Payer: COMMERCIAL

## 2023-08-28 VITALS — HEIGHT: 68 IN | WEIGHT: 149 LBS | BODY MASS INDEX: 22.58 KG/M2

## 2023-08-28 DIAGNOSIS — Z01.419 ENCOUNTER FOR ANNUAL ROUTINE GYNECOLOGICAL EXAMINATION: Primary | ICD-10-CM

## 2023-08-28 DIAGNOSIS — Z32.02 NEGATIVE PREGNANCY TEST: ICD-10-CM

## 2023-08-28 DIAGNOSIS — Z30.41 ENCOUNTER FOR SURVEILLANCE OF CONTRACEPTIVE PILLS: ICD-10-CM

## 2023-08-28 LAB — SL AMB POCT URINE HCG: NORMAL

## 2023-08-28 PROCEDURE — 99395 PREV VISIT EST AGE 18-39: CPT

## 2023-08-28 PROCEDURE — 81025 URINE PREGNANCY TEST: CPT

## 2023-08-28 RX ORDER — ACETAMINOPHEN AND CODEINE PHOSPHATE 120; 12 MG/5ML; MG/5ML
1 SOLUTION ORAL DAILY
Qty: 90 TABLET | Refills: 1 | Status: SHIPPED | OUTPATIENT
Start: 2023-08-28

## 2023-08-28 NOTE — ASSESSMENT & PLAN NOTE
Normal findings on routine gyn exam  ASCCP guidelines reviewed. Pap 05/17/2022 negative, due 2025  STD screening offered. Pt declined - recently had screening done this year. SBE encouraged. CBE annually. Safe sex practices and condom use encouraged. Daily exercise and healthy diet encouraged.
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
## 2023-08-29 DIAGNOSIS — N89.8 VAGINAL ITCHING: ICD-10-CM

## 2023-08-29 DIAGNOSIS — J30.89 ENVIRONMENTAL AND SEASONAL ALLERGIES: ICD-10-CM

## 2023-08-29 RX ORDER — FLUTICASONE PROPIONATE 50 MCG
SPRAY, SUSPENSION (ML) NASAL
Qty: 16 G | Refills: 1 | Status: SHIPPED | OUTPATIENT
Start: 2023-08-29

## 2023-09-12 ENCOUNTER — TELEPHONE (OUTPATIENT)
Dept: OBGYN CLINIC | Facility: CLINIC | Age: 28
End: 2023-09-12

## 2023-09-19 ENCOUNTER — OFFICE VISIT (OUTPATIENT)
Dept: FAMILY MEDICINE CLINIC | Facility: CLINIC | Age: 28
End: 2023-09-19
Payer: COMMERCIAL

## 2023-09-19 VITALS
WEIGHT: 153 LBS | SYSTOLIC BLOOD PRESSURE: 120 MMHG | DIASTOLIC BLOOD PRESSURE: 72 MMHG | OXYGEN SATURATION: 100 % | HEART RATE: 67 BPM | BODY MASS INDEX: 23.19 KG/M2 | HEIGHT: 68 IN | TEMPERATURE: 98.6 F

## 2023-09-19 DIAGNOSIS — J45.20 MILD INTERMITTENT ASTHMA WITHOUT COMPLICATION: ICD-10-CM

## 2023-09-19 DIAGNOSIS — J30.89 ENVIRONMENTAL AND SEASONAL ALLERGIES: ICD-10-CM

## 2023-09-19 DIAGNOSIS — L29.9 PRURITUS: Primary | ICD-10-CM

## 2023-09-19 PROCEDURE — 99214 OFFICE O/P EST MOD 30 MIN: CPT | Performed by: NURSE PRACTITIONER

## 2023-09-19 RX ORDER — HYDROXYZINE HYDROCHLORIDE 25 MG/1
25 TABLET, FILM COATED ORAL EVERY 6 HOURS PRN
Qty: 30 TABLET | Refills: 0 | Status: SHIPPED | OUTPATIENT
Start: 2023-09-19

## 2023-09-19 NOTE — PATIENT INSTRUCTIONS
Itchy Skin   WHAT YOU NEED TO KNOW:   Itchy skin may interfere with your daily tasks and sleep. Treatment is important because constant scratching can damage your skin and increase your risk of infection. DISCHARGE INSTRUCTIONS:   Medicines:   Medicines  may help decrease itching or inflammation. Skin creams, such as steroid creams or anti-itch creams may also help. Take your medicine as directed. Contact your healthcare provider if you think your medicine is not helping or if you have side effects. Tell your provider if you are allergic to any medicine. Keep a list of the medicines, vitamins, and herbs you take. Include the amounts, and when and why you take them. Bring the list or the pill bottles to follow-up visits. Carry your medicine list with you in case of an emergency. Follow up with your healthcare provider as directed:  Write down your questions so you remember to ask them during your visits. Manage itchy skin:   Take short showers in warm water. Avoid using hot water for your showers. Use only a small amount of mild skin cleanser. Apply moisturizer or cooling creams  after you bathe and throughout the day. Use a cool mist humidifier  to moisten the air in your home and maintain a cool temperature. Cool, humid air can decrease skin dryness and itching. Avoid allergens and skin irritants. Do not use perfume, fabric softener, or makeup that irritates your skin. Use a mild detergent to wash your clothes. Wear loose cotton clothes and use cotton sheets. Avoid wool. Contact your healthcare provider if:   Your itching does not improve or gets worse. Scratching has caused your skin to be red or swollen. You have new symptoms such as weight loss, fatigue, changes in urination, or fever. You have questions or concerns about your condition or care.     © Copyright [facility] 2022 Information is for End User's use only and may not be sold, redistributed or otherwise used for commercial purposes. The above information is an  only. It is not intended as medical advice for individual conditions or treatments. Talk to your doctor, nurse or pharmacist before following any medical regimen to see if it is safe and effective for you.

## 2023-09-19 NOTE — PROGRESS NOTES
Name: Kostas Ayoubtingham      : 1995      MRN: [id number]  Encounter Provider: JONAS Bucio  Encounter Date: 2023   Encounter department: [address] [place] [address] [facility]     1. Pruritus  Comments:  Advised to use tepid water, pat skin dry, avoid scratching, daily medications as prescribed. Advised use of Eucerin, Aveeno, CeraVe after bathing. Orders:  -     hydrOXYzine HCL (ATARAX) 25 mg tablet; Take 1 tablet (25 mg total) by mouth every 6 (six) hours as needed for itching    2. Mild intermittent asthma without complication  Assessment & Plan:  Denies exacerbation of symptoms. To continue on albuterol as needed continue Zyrtec daily. 3. Environmental and seasonal allergies  Assessment & Plan:  Advised to monitor for triggers. To take daily medications as prescribed to avoid flareups. Subjective      Patient presents to the office for evaluation of rash. Started a week ago. First observed on back, then to bilateral arms. Patient states she noticed "hives."  Patient denies new soaps, detergents, lotions, medications, food. Patient does have history of asthma and eczema. Has a history of environmental and seasonal allergies. Denies exacerbation of asthma symptoms. Patient notes increased pruritus, worse at night. Patient does observe that rash to her arms has since went away. Denies wheezing, shortness of breath, sensation of throat closing up, or any other symptoms at this time. Has not used any over-the-counter products for symptoms. Review of Systems   Constitutional: Negative for activity change, appetite change and fatigue. HENT: Negative for congestion, ear pain, sinus pressure, sinus pain, sneezing, sore throat and trouble swallowing. Eyes: Negative for discharge and itching. Respiratory: Negative for cough and shortness of breath. Cardiovascular: Negative for chest pain and palpitations. Gastrointestinal: Negative for nausea and vomiting. Genitourinary: Negative for decreased urine volume. Musculoskeletal: Negative for arthralgias and myalgias. Skin: Positive for rash. Allergic/Immunologic: Positive for environmental allergies. Neurological: Negative for dizziness, weakness, light-headedness and headaches. Hematological: Negative for adenopathy. Does not bruise/bleed easily. Psychiatric/Behavioral: Negative for confusion. Current Outpatient Medications on File Prior to Visit   Medication Sig   • Albuterol Sulfate (ProAir RespiClick) 503 (90 Base) MCG/ACT AEPB Inhale 2 puffs as needed (sob/wheezing)   • azelastine (OPTIVAR) 0.05 % ophthalmic solution    • cetirizine (ZyrTEC) 10 mg tablet Take 10 mg by mouth daily   • EPINEPHrine (EPIPEN) 0.3 mg/0.3 mL SOAJ Inject 0.3 mL (0.3 mg total) into a muscle once for 1 dose   • fluticasone (FLONASE) 50 mcg/act nasal spray instill 2 sprays into each nostril daily   • norethindrone (Dominique) 0.35 MG tablet Take 1 tablet (0.35 mg total) by mouth daily   • sertraline (ZOLOFT) 25 mg tablet Take 1.5 tablets (37.5 mg total) by mouth daily   • triamcinolone (KENALOG) 0.1 % ointment apply topically to affected area twice a day   • [DISCONTINUED] sulfamethoxazole-trimethoprim (BACTRIM DS) 800-160 mg per tablet Take 1 tablet by mouth 2 (two) times a day (Patient not taking: Reported on 8/25/2023)       Objective     /72 (BP Location: Left arm, Patient Position: Sitting, Cuff Size: Standard)   Pulse 67   Temp 98.6 °F (37 °C) (Tympanic)   Ht 5' 8" (1.727 m)   Wt 69.4 kg (153 lb)   LMP 08/29/2023 (Exact Date)   SpO2 100%   Breastfeeding No   BMI 23.26 kg/m²     Physical Exam  Vitals reviewed. Constitutional:       General: She is not in acute distress. Appearance: Normal appearance. She is not ill-appearing. HENT:      Head: Normocephalic and atraumatic.       Right Ear: Tympanic membrane, ear canal and external ear normal. Left Ear: Tympanic membrane, ear canal and external ear normal.      Nose: Nose normal.      Mouth/Throat:      Mouth: Mucous membranes are moist.      Pharynx: Oropharynx is clear. Eyes:      Conjunctiva/sclera: Conjunctivae normal.      Pupils: Pupils are equal, round, and reactive to light. Cardiovascular:      Rate and Rhythm: Normal rate and regular rhythm. Pulses: Normal pulses. Heart sounds: Normal heart sounds. No murmur heard. Pulmonary:      Effort: Pulmonary effort is normal.      Breath sounds: Normal breath sounds. No stridor. No wheezing. Abdominal:      General: Bowel sounds are normal.      Palpations: Abdomen is soft. Tenderness: There is no abdominal tenderness. Musculoskeletal:         General: Normal range of motion. Cervical back: Normal range of motion and neck supple. Lymphadenopathy:      Cervical: No cervical adenopathy. Skin:     General: Skin is warm and dry. Capillary Refill: Capillary refill takes less than 2 seconds. Findings: No rash. Neurological:      General: No focal deficit present. Mental Status: She is alert and oriented to person, place, and time.    Psychiatric:         Mood and Affect: Mood normal.         Behavior: Behavior normal.       JONAS De León

## 2023-10-03 ENCOUNTER — OFFICE VISIT (OUTPATIENT)
Dept: OBGYN CLINIC | Facility: CLINIC | Age: 28
End: 2023-10-03
Payer: COMMERCIAL

## 2023-10-03 VITALS — SYSTOLIC BLOOD PRESSURE: 112 MMHG | DIASTOLIC BLOOD PRESSURE: 72 MMHG | BODY MASS INDEX: 23.05 KG/M2 | WEIGHT: 151.6 LBS

## 2023-10-03 DIAGNOSIS — L73.1 INGROWN HAIR: ICD-10-CM

## 2023-10-03 DIAGNOSIS — N90.89 VULVAR LUMP: Primary | ICD-10-CM

## 2023-10-03 PROCEDURE — 99213 OFFICE O/P EST LOW 20 MIN: CPT | Performed by: OBSTETRICS & GYNECOLOGY
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
## 2023-10-20 DIAGNOSIS — L29.9 PRURITUS: ICD-10-CM

## 2023-10-20 RX ORDER — HYDROXYZINE HYDROCHLORIDE 25 MG/1
25 TABLET, FILM COATED ORAL EVERY 6 HOURS PRN
Qty: 30 TABLET | Refills: 0 | Status: SHIPPED | OUTPATIENT
Start: 2023-10-20

## 2023-10-27 PROBLEM — Z01.419 ENCOUNTER FOR ANNUAL ROUTINE GYNECOLOGICAL EXAMINATION: Status: RESOLVED | Noted: 2023-08-28 | Resolved: 2023-10-27

## 2023-11-21 RX ORDER — SERTRALINE HYDROCHLORIDE 25 MG/1
37.5 TABLET, FILM COATED ORAL DAILY
Qty: 135 TABLET | Refills: 1 | Status: SHIPPED | OUTPATIENT
Start: 2023-11-21

## 2023-11-28 DIAGNOSIS — J30.89 ENVIRONMENTAL AND SEASONAL ALLERGIES: ICD-10-CM

## 2023-11-28 RX ORDER — FLUTICASONE PROPIONATE 50 MCG
SPRAY, SUSPENSION (ML) NASAL
Qty: 16 G | Refills: 1 | Status: SHIPPED | OUTPATIENT
Start: 2023-11-28

## 2023-12-28 DIAGNOSIS — L29.9 PRURITUS: ICD-10-CM

## 2023-12-28 RX ORDER — HYDROXYZINE HYDROCHLORIDE 25 MG/1
25 TABLET, FILM COATED ORAL EVERY 6 HOURS PRN
Qty: 30 TABLET | Refills: 0 | Status: SHIPPED | OUTPATIENT
Start: 2023-12-28

## 2024-01-18 ENCOUNTER — OFFICE VISIT (OUTPATIENT)
Dept: FAMILY MEDICINE CLINIC | Facility: CLINIC | Age: 29
End: 2024-01-18
Payer: COMMERCIAL

## 2024-01-18 VITALS
OXYGEN SATURATION: 98 % | WEIGHT: 156.2 LBS | HEART RATE: 83 BPM | SYSTOLIC BLOOD PRESSURE: 110 MMHG | TEMPERATURE: 98.7 F | BODY MASS INDEX: 23.67 KG/M2 | DIASTOLIC BLOOD PRESSURE: 76 MMHG | HEIGHT: 68 IN

## 2024-01-18 DIAGNOSIS — Z13.0 SCREENING FOR DEFICIENCY ANEMIA: ICD-10-CM

## 2024-01-18 DIAGNOSIS — Z13.6 ENCOUNTER FOR SCREENING FOR CARDIOVASCULAR DISORDERS: ICD-10-CM

## 2024-01-18 DIAGNOSIS — Z00.00 ANNUAL PHYSICAL EXAM: Primary | ICD-10-CM

## 2024-01-18 DIAGNOSIS — Z23 ENCOUNTER FOR IMMUNIZATION: ICD-10-CM

## 2024-01-18 DIAGNOSIS — Z13.1 ENCOUNTER FOR SCREENING FOR DIABETES MELLITUS: ICD-10-CM

## 2024-01-18 DIAGNOSIS — Z13.29 SCREENING FOR THYROID DISORDER: ICD-10-CM

## 2024-01-18 DIAGNOSIS — Z13.21 ENCOUNTER FOR VITAMIN DEFICIENCY SCREENING: ICD-10-CM

## 2024-01-18 PROCEDURE — 90686 IIV4 VACC NO PRSV 0.5 ML IM: CPT | Performed by: NURSE PRACTITIONER

## 2024-01-18 PROCEDURE — 90471 IMMUNIZATION ADMIN: CPT | Performed by: NURSE PRACTITIONER

## 2024-01-18 PROCEDURE — 99395 PREV VISIT EST AGE 18-39: CPT | Performed by: NURSE PRACTITIONER
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
## 2024-01-18 NOTE — LETTER
January 18, 2024     Patient: Susana Carl  YOB: 1995  Date of Visit: 1/18/2024      To Whom it May Concern:    Susana Carl is under my professional care. Susana was seen in my office on 1/18/2024.    Please accommodate Susana with a seat in the front of the classroom due to visual impairment.    If you have any questions or concerns, please don't hesitate to call.          Sincerely,          JONAS Fernandez        CC: No Recipients

## 2024-01-25 DIAGNOSIS — J30.89 ENVIRONMENTAL AND SEASONAL ALLERGIES: ICD-10-CM

## 2024-01-25 RX ORDER — FLUTICASONE PROPIONATE 50 MCG
SPRAY, SUSPENSION (ML) NASAL
Qty: 16 G | Refills: 1 | Status: SHIPPED | OUTPATIENT
Start: 2024-01-25

## 2024-01-29 DIAGNOSIS — L29.9 PRURITUS: ICD-10-CM

## 2024-01-29 RX ORDER — HYDROXYZINE HYDROCHLORIDE 25 MG/1
25 TABLET, FILM COATED ORAL EVERY 6 HOURS PRN
Qty: 30 TABLET | Refills: 0 | Status: SHIPPED | OUTPATIENT
Start: 2024-01-29

## 2024-01-31 ENCOUNTER — TELEPHONE (OUTPATIENT)
Dept: FAMILY MEDICINE CLINIC | Facility: CLINIC | Age: 29
End: 2024-01-31

## 2024-01-31 DIAGNOSIS — Z11.1 TUBERCULOSIS SCREENING: Primary | ICD-10-CM

## 2024-02-01 ENCOUNTER — APPOINTMENT (OUTPATIENT)
Age: 29
End: 2024-02-01
Payer: COMMERCIAL

## 2024-02-01 DIAGNOSIS — Z13.1 ENCOUNTER FOR SCREENING FOR DIABETES MELLITUS: ICD-10-CM

## 2024-02-01 DIAGNOSIS — Z11.1 TUBERCULOSIS SCREENING: ICD-10-CM

## 2024-02-01 DIAGNOSIS — Z13.21 ENCOUNTER FOR VITAMIN DEFICIENCY SCREENING: ICD-10-CM

## 2024-02-01 DIAGNOSIS — Z13.29 SCREENING FOR THYROID DISORDER: ICD-10-CM

## 2024-02-01 DIAGNOSIS — Z13.0 SCREENING FOR DEFICIENCY ANEMIA: ICD-10-CM

## 2024-02-01 LAB
25(OH)D3 SERPL-MCNC: 8.3 NG/ML (ref 30–100)
BASOPHILS # BLD AUTO: 0.04 THOUSANDS/ÂΜL (ref 0–0.1)
BASOPHILS NFR BLD AUTO: 1 % (ref 0–1)
EOSINOPHIL # BLD AUTO: 0.19 THOUSAND/ÂΜL (ref 0–0.61)
EOSINOPHIL NFR BLD AUTO: 3 % (ref 0–6)
ERYTHROCYTE [DISTWIDTH] IN BLOOD BY AUTOMATED COUNT: 15.1 % (ref 11.6–15.1)
HCT VFR BLD AUTO: 39.2 % (ref 34.8–46.1)
HGB BLD-MCNC: 13 G/DL (ref 11.5–15.4)
IMM GRANULOCYTES # BLD AUTO: 0.02 THOUSAND/UL (ref 0–0.2)
IMM GRANULOCYTES NFR BLD AUTO: 0 % (ref 0–2)
LYMPHOCYTES # BLD AUTO: 3.04 THOUSANDS/ÂΜL (ref 0.6–4.47)
LYMPHOCYTES NFR BLD AUTO: 43 % (ref 14–44)
MCH RBC QN AUTO: 28.1 PG (ref 26.8–34.3)
MCHC RBC AUTO-ENTMCNC: 33.2 G/DL (ref 31.4–37.4)
MCV RBC AUTO: 85 FL (ref 82–98)
MONOCYTES # BLD AUTO: 0.59 THOUSAND/ÂΜL (ref 0.17–1.22)
MONOCYTES NFR BLD AUTO: 8 % (ref 4–12)
NEUTROPHILS # BLD AUTO: 3.2 THOUSANDS/ÂΜL (ref 1.85–7.62)
NEUTS SEG NFR BLD AUTO: 45 % (ref 43–75)
NRBC BLD AUTO-RTO: 0 /100 WBCS
PLATELET # BLD AUTO: 283 THOUSANDS/UL (ref 149–390)
PMV BLD AUTO: 10.6 FL (ref 8.9–12.7)
RBC # BLD AUTO: 4.63 MILLION/UL (ref 3.81–5.12)
TSH SERPL DL<=0.05 MIU/L-ACNC: 1.08 UIU/ML (ref 0.45–4.5)
WBC # BLD AUTO: 7.08 THOUSAND/UL (ref 4.31–10.16)

## 2024-02-01 PROCEDURE — 84443 ASSAY THYROID STIM HORMONE: CPT

## 2024-02-01 PROCEDURE — 83036 HEMOGLOBIN GLYCOSYLATED A1C: CPT

## 2024-02-01 PROCEDURE — 82306 VITAMIN D 25 HYDROXY: CPT

## 2024-02-01 PROCEDURE — 80053 COMPREHEN METABOLIC PANEL: CPT

## 2024-02-01 PROCEDURE — 86480 TB TEST CELL IMMUN MEASURE: CPT

## 2024-02-01 PROCEDURE — 85025 COMPLETE CBC W/AUTO DIFF WBC: CPT

## 2024-02-01 PROCEDURE — 36415 COLL VENOUS BLD VENIPUNCTURE: CPT

## 2024-02-02 DIAGNOSIS — E55.9 VITAMIN D DEFICIENCY: Primary | ICD-10-CM

## 2024-02-02 LAB
ALBUMIN SERPL BCP-MCNC: 4 G/DL (ref 3.5–5)
ALP SERPL-CCNC: 59 U/L (ref 34–104)
ALT SERPL W P-5'-P-CCNC: 14 U/L (ref 7–52)
ANION GAP SERPL CALCULATED.3IONS-SCNC: 8 MMOL/L
AST SERPL W P-5'-P-CCNC: 21 U/L (ref 13–39)
BILIRUB SERPL-MCNC: 0.27 MG/DL (ref 0.2–1)
BUN SERPL-MCNC: 10 MG/DL (ref 5–25)
CALCIUM SERPL-MCNC: 9.2 MG/DL (ref 8.4–10.2)
CHLORIDE SERPL-SCNC: 103 MMOL/L (ref 96–108)
CO2 SERPL-SCNC: 24 MMOL/L (ref 21–32)
CREAT SERPL-MCNC: 0.68 MG/DL (ref 0.6–1.3)
EST. AVERAGE GLUCOSE BLD GHB EST-MCNC: 111 MG/DL
GFR SERPL CREATININE-BSD FRML MDRD: 121 ML/MIN/1.73SQ M
GLUCOSE P FAST SERPL-MCNC: 90 MG/DL (ref 65–99)
HBA1C MFR BLD: 5.5 %
POTASSIUM SERPL-SCNC: 4 MMOL/L (ref 3.5–5.3)
PROT SERPL-MCNC: 7.1 G/DL (ref 6.4–8.4)
SODIUM SERPL-SCNC: 135 MMOL/L (ref 135–147)

## 2024-02-02 RX ORDER — ERGOCALCIFEROL 1.25 MG/1
50000 CAPSULE ORAL WEEKLY
Qty: 12 CAPSULE | Refills: 0 | Status: SHIPPED | OUTPATIENT
Start: 2024-02-02 | End: 2024-04-20

## 2024-02-03 DIAGNOSIS — Z30.41 ENCOUNTER FOR SURVEILLANCE OF CONTRACEPTIVE PILLS: ICD-10-CM

## 2024-02-03 LAB
GAMMA INTERFERON BACKGROUND BLD IA-ACNC: <0 IU/ML
M TB IFN-G BLD-IMP: NEGATIVE
M TB IFN-G CD4+ BCKGRND COR BLD-ACNC: 0 IU/ML
M TB IFN-G CD4+ BCKGRND COR BLD-ACNC: 0.01 IU/ML
MITOGEN IGNF BCKGRD COR BLD-ACNC: 10 IU/ML

## 2024-02-05 RX ORDER — NORETHINDRONE 0.35 MG/1
1 TABLET ORAL DAILY
Qty: 84 TABLET | Refills: 3 | Status: SHIPPED | OUTPATIENT
Start: 2024-02-05

## 2024-02-09 ENCOUNTER — TELEPHONE (OUTPATIENT)
Dept: FAMILY MEDICINE CLINIC | Facility: CLINIC | Age: 29
End: 2024-02-09

## 2024-02-09 NOTE — TELEPHONE ENCOUNTER
Please call pt and let him know his cholesterol levels are much improved- LDL improved from 121 down to 73. Blood sugar, kidney and liver function within normal range- continue current medication. Please inform patient she can obtain a pregnancy test at the store.

## 2024-02-19 NOTE — ASSESSMENT & PLAN NOTE
Exam consistent with candidiasis  Recommended Diflucan, nystatin/triamcinolone, conservative vulvar hygiene, pelvic rest until sx fully resolved  Discussed common triggers and what to avoid  Reviewed sx to report and reasons to call  Detail Level: Zone

## 2024-02-21 ENCOUNTER — TELEPHONE (OUTPATIENT)
Age: 29
End: 2024-02-21

## 2024-02-21 ENCOUNTER — NURSE TRIAGE (OUTPATIENT)
Age: 29
End: 2024-02-21

## 2024-02-21 ENCOUNTER — OFFICE VISIT (OUTPATIENT)
Dept: OBGYN CLINIC | Facility: CLINIC | Age: 29
End: 2024-02-21
Payer: COMMERCIAL

## 2024-02-21 VITALS — WEIGHT: 158 LBS | SYSTOLIC BLOOD PRESSURE: 110 MMHG | BODY MASS INDEX: 24.02 KG/M2 | DIASTOLIC BLOOD PRESSURE: 64 MMHG

## 2024-02-21 DIAGNOSIS — B37.9 YEAST INFECTION: ICD-10-CM

## 2024-02-21 DIAGNOSIS — R35.0 URINARY FREQUENCY: ICD-10-CM

## 2024-02-21 DIAGNOSIS — N89.8 VAGINAL ITCHING: Primary | ICD-10-CM

## 2024-02-21 DIAGNOSIS — Z3A.01 7 WEEKS GESTATION OF PREGNANCY: ICD-10-CM

## 2024-02-21 LAB
BACTERIA UR QL AUTO: ABNORMAL /HPF
BILIRUB UR QL STRIP: NEGATIVE
CLARITY UR: ABNORMAL
CLUE CELLS SPEC QL WET PREP: NEGATIVE
COLOR UR: ABNORMAL
GLUCOSE UR STRIP-MCNC: NEGATIVE MG/DL
HGB UR QL STRIP.AUTO: NEGATIVE
KETONES UR STRIP-MCNC: NEGATIVE MG/DL
LEUKOCYTE ESTERASE UR QL STRIP: ABNORMAL
MUCOUS THREADS UR QL AUTO: ABNORMAL
NITRITE UR QL STRIP: NEGATIVE
NON-SQ EPI CELLS URNS QL MICRO: ABNORMAL /HPF
PH UR STRIP.AUTO: 6.5 [PH]
PROT UR STRIP-MCNC: ABNORMAL MG/DL
RBC #/AREA URNS AUTO: ABNORMAL /HPF
SP GR UR STRIP.AUTO: 1.03 (ref 1–1.03)
T VAGINALIS VAG QL WET PREP: NEGATIVE
UROBILINOGEN UR STRIP-ACNC: <2 MG/DL
WBC #/AREA URNS AUTO: ABNORMAL /HPF
YEAST VAG QL WET PREP: NEGATIVE

## 2024-02-21 PROCEDURE — 87510 GARDNER VAG DNA DIR PROBE: CPT | Performed by: PHYSICIAN ASSISTANT

## 2024-02-21 PROCEDURE — 87480 CANDIDA DNA DIR PROBE: CPT | Performed by: PHYSICIAN ASSISTANT

## 2024-02-21 PROCEDURE — 87086 URINE CULTURE/COLONY COUNT: CPT | Performed by: PHYSICIAN ASSISTANT

## 2024-02-21 PROCEDURE — 87186 SC STD MICRODIL/AGAR DIL: CPT | Performed by: PHYSICIAN ASSISTANT

## 2024-02-21 PROCEDURE — 81001 URINALYSIS AUTO W/SCOPE: CPT | Performed by: PHYSICIAN ASSISTANT

## 2024-02-21 PROCEDURE — 87660 TRICHOMONAS VAGIN DIR PROBE: CPT | Performed by: PHYSICIAN ASSISTANT

## 2024-02-21 PROCEDURE — 99214 OFFICE O/P EST MOD 30 MIN: CPT | Performed by: PHYSICIAN ASSISTANT

## 2024-02-21 PROCEDURE — 87210 SMEAR WET MOUNT SALINE/INK: CPT | Performed by: PHYSICIAN ASSISTANT

## 2024-02-21 PROCEDURE — 87591 N.GONORRHOEAE DNA AMP PROB: CPT | Performed by: PHYSICIAN ASSISTANT

## 2024-02-21 PROCEDURE — 87077 CULTURE AEROBIC IDENTIFY: CPT | Performed by: PHYSICIAN ASSISTANT

## 2024-02-21 PROCEDURE — 87491 CHLMYD TRACH DNA AMP PROBE: CPT | Performed by: PHYSICIAN ASSISTANT

## 2024-02-21 RX ORDER — ONDANSETRON 4 MG/1
TABLET, ORALLY DISINTEGRATING ORAL
COMMUNITY
Start: 2023-12-29

## 2024-02-21 NOTE — TELEPHONE ENCOUNTER
"Patient called in with s/s of a yeast infection (itching, white discharge).   Currently 7w4d pregnant.  She did take 7 day Monistat with some relief.  02/17 was the last day she took the Monistat.  She is still experiencing some discharge and itching.      Patient scheduled for appointment in office today for evaluation due to being pregnant.      Reason for Disposition   Home treatment > 3 days for 'yeast infection' and not improved   Patient wants to be seen    Answer Assessment - Initial Assessment Questions  1. DISCHARGE: \"Describe the discharge.\" (e.g., white, yellow, green, gray, foamy, cottage cheese-like)      White discharge,     2. ODOR: \"Is there a bad odor?\"      No    3. ONSET: \"When did the discharge begin?\"      02/09    4. RASH: \"Is there a rash in that area?\" If Yes, ask: \"Describe it.\" (e.g., redness, blisters, sores, bumps)      No    5. ABDOMINAL PAIN: \"Are you having any abdominal pain?\" If Yes, ask: \"What does it feel like?\" (e.g., crampy, dull, intermittent, constant)       No    6. ABDOMINAL PAIN SEVERITY: If present, ask: \"How bad is it?\"  (e.g., Scale 1-10; mild, moderate, or severe)    - MILD (1-3): doesn't interfere with normal activities, abdomen soft and not tender to touch     - MODERATE (4-7): interferes with normal activities or awakens from sleep, tender to touch     - SEVERE (8-10): excruciating pain, doubled over, unable to do any normal activities      No    7. CAUSE: \"What do you think is causing the discharge?\"      Yeast infections - does get them frequently    8. OTHER SYMPTOMS: \"Do you have any other symptoms?\" (e.g., fever, itching, vaginal bleeding, pain with urination)      Denies    9. LICO: \"What date are you expecting to deliver?\"   LMP 01/01        10. PREGNANCY: \"How many weeks pregnant are you?\"        7w    Protocols used: Pregnancy - Vaginal Discharge-ADULT-[place]    "

## 2024-02-21 NOTE — PROGRESS NOTES
Assessment and Plan:    1. Vaginal itching  - Chlamydia/GC amplified DNA by PCR  - POCT wet mount  - Vaginosis DNA Probe    2. Yeast infection  - Due to patient's symptoms and physical exam findings, will empirically treat until Affirm results despite negative wet mount.   - Chlamydia/GC amplified DNA by PCR  - POCT wet mount  - Vaginosis DNA Probe  - terconazole (TERAZOL 7) 0.4 % vaginal cream; Insert 1 applicator into the vagina daily at bedtime for 7 days  Dispense: 45 g; Refill: 0     3. 7 weeks gestation  - US scheduled for 3/8/2024     4. Urinary frequency  - Urine culture  - Urinalysis with microscopic    History, physical exam, documentation performed by Porsha ATWOOD with direct supervision and guidance by me     Subjective:  Susana is a 28 year old  female at 7 weeks gestation with a past medical history of asthma and post partum depression presenting with a chief complaint of clumpy white discharge and itchiness since . She used 7 day Monistat (last used on ) but is still experiencing symptoms. Review of systems is positive for slight abdominal pain x 1 week that she describes as a dull, intermittent pain and urinary frequency and urgency.She is currently sexually active with multiple male partners. She is agreeable to STD testing today. She denies recent antibiotic use. Denies associated symptoms such as pelvic pain, odor, dysuria, and hematuria. She denies any nausea and other symptoms since getting pregnant.     She does have a history of yeast infections and has been treated with Diflucan in the past prior to pregnancy. She is currently estimated 7 weeks pregnant and has an ultrasound scheduled with Dr. Mar on 3/8/2024. She plans on staying with [facility] for prenatal care.       Objective:  Vitals:    24 1553   BP: 110/64     General: Patient appears well and is not in any acute distress   Abdomen: Soft, non-distended, non-tender to palpation  External genitals: White  discharge noted on the vulva  Vagina: White clumpy discharge noted. Vaginal walls appear erythematous and irritated  Cervix: White clumpy discharge noted. Patient experienced discomfort with placement of speculum but does not have explicit CMT.   Uterus: Normal, mobile. Without palpable mass. Non-tender to palpation   Adnexa: Normal, without palpable mass. Non-tender to palpation

## 2024-02-21 NOTE — TELEPHONE ENCOUNTER
Received call from Aki with [facility] pharmacy. States that pts terconazole needs a prior authorization.

## 2024-02-22 DIAGNOSIS — N76.0 BACTERIAL VAGINITIS: Primary | ICD-10-CM

## 2024-02-22 DIAGNOSIS — B96.89 BACTERIAL VAGINITIS: Primary | ICD-10-CM

## 2024-02-22 LAB
CANDIDA RRNA VAG QL PROBE: DETECTED
G VAGINALIS RRNA GENITAL QL PROBE: DETECTED
T VAGINALIS RRNA GENITAL QL PROBE: NOT DETECTED

## 2024-02-22 RX ORDER — METRONIDAZOLE 500 MG/1
500 TABLET ORAL 2 TIMES DAILY
Qty: 14 TABLET | Refills: 0 | Status: SHIPPED | OUTPATIENT
Start: 2024-02-22 | End: 2024-02-29

## 2024-02-22 NOTE — TELEPHONE ENCOUNTER
PA for Terconazole  4 %  7 day    Submitted via    [x]CMM-KEY [id number]  [][employer]-Case ID #   []Faxed to plan   []Other website   []Phone call Case ID #     Office notes sent, clinical questions answered. Awaiting determination    Turnaround time for your insurance to make a decision on your Prior Authorization can take 7-21 business days.

## 2024-02-22 NOTE — TELEPHONE ENCOUNTER
PA for Terconazole Approved     Date(s) approved 02/20/24-03/22/2024      Patient advised by [x] [employer] Message                      [x] Phone call       Pharmacy advised by [x]Fax                                     []Phone call    Approval letter scanned into Media Yes

## 2024-02-23 DIAGNOSIS — N30.00 ACUTE CYSTITIS WITHOUT HEMATURIA: Primary | ICD-10-CM

## 2024-02-23 LAB
BACTERIA UR CULT: ABNORMAL
C TRACH DNA SPEC QL NAA+PROBE: NEGATIVE
N GONORRHOEA DNA SPEC QL NAA+PROBE: NEGATIVE

## 2024-02-23 RX ORDER — NITROFURANTOIN 25; 75 MG/1; MG/1
100 CAPSULE ORAL 2 TIMES DAILY
Qty: 10 CAPSULE | Refills: 0 | Status: SHIPPED | OUTPATIENT
Start: 2024-02-23 | End: 2024-02-28

## 2024-03-08 ENCOUNTER — INITIAL PRENATAL (OUTPATIENT)
Dept: OBGYN CLINIC | Facility: CLINIC | Age: 29
End: 2024-03-08

## 2024-03-08 VITALS — BODY MASS INDEX: 24.02 KG/M2 | WEIGHT: 158 LBS | SYSTOLIC BLOOD PRESSURE: 110 MMHG | DIASTOLIC BLOOD PRESSURE: 76 MMHG

## 2024-03-08 DIAGNOSIS — O21.9 NAUSEA AND VOMITING DURING PREGNANCY: ICD-10-CM

## 2024-03-08 DIAGNOSIS — Z34.90 EARLY STAGE OF PREGNANCY: Primary | ICD-10-CM

## 2024-03-08 RX ORDER — ONDANSETRON 4 MG/1
4 TABLET, FILM COATED ORAL EVERY 8 HOURS PRN
Qty: 20 TABLET | Refills: 0 | Status: SHIPPED | OUTPATIENT
Start: 2024-03-08

## 2024-03-08 RX ORDER — PNV NO.95/FERROUS FUM/FOLIC AC 28MG-0.8MG
TABLET ORAL
COMMUNITY

## 2024-03-08 NOTE — PROGRESS NOTES
Patient here for early ultrasound.  LMP: 24  GA: 9w4d  LICO: 10/7/24    Unplanned Pregnancy  Regular Cycles  Denies leaking of fluid, bleeding, cramping, breast tenderness.  Patient has nausea and vomiting

## 2024-03-08 NOTE — PROGRESS NOTES
Subjective  Patient ID: Susana Carl is a 28 y.o. female here for Pregnancy Ultrasound    Newly Pregnant  Patient's last menstrual period was 2024 (exact date). giving her an LICO of 10/7/24 and a gestational age of  9 weeks 4 days (based on LMP)    Menstrual cycle: regular, cycle length:  26 days  Pregnancy was unplanned/surpised.   She has started taking a prenatal vitamin    She is C/O amenorrhea  Signs and symptoms of pregnancy:   Breast tenderness: no  Fatigue: no  Cramping or Pelvic Pain: no  Spotting or Vaginal Bleeding: no  Nausea or vomiting: yes. Took zofran in prior pregnancy would like this again.     OB History    Para Term  AB Living   3 2 2 0 1 2   SAB IAB Ectopic Multiple Live Births   0 1 0 0 2      # Outcome Date GA Lbr Óscar/2nd Weight Sex Delivery Anes PTL Lv   3 IAB 03/15/23 5w3d          2 Term 21 40w0d / 00:32 3300 g (7 lb 4.4 oz) M Vag-Spont   JESSICA   1 Term 18 40w3d   M Vag-Spont   JESSICA      Birth Comments: 6+ lbs, uncomplicated  as per pt        The following portions of the patient's history were reviewed and updated as appropriate: allergies, current medications, past family history, past medical history, past social history, past surgical history, and problem list.    Perinent hx that may affect pregnancy:  2 prior       Review of Systems  Negative unless otherwise noted in HPI.     See HPI for pertinent positives.          /76 (BP Location: Left arm, Patient Position: Sitting, Cuff Size: Standard)   Wt 71.7 kg (158 lb)   LMP 2024 (Exact Date)   BMI 24.02 kg/m²   OBGyn Exam  Results for orders placed or performed in visit on 24   Chlamydia/GC amplified DNA by PCR    Specimen: Cervix; Thin-Prep Vial   Result Value Ref Range    N gonorrhoeae, DNA Probe Negative Negative    Chlamydia trachomatis, DNA Probe Negative Negative   VAGINOSIS DNA PROBE (AFFIRM)    Specimen: Vaginal; Genital   Result Value Ref Range    Trichomonas  vaginalis Not Detected Not Detected    Gardnerella vaginalis Detected (A) Not Detected    Candida Species Detected (A) Not Detected   Urine culture    Specimen: Urine, Clean Catch   Result Value Ref Range    Urine Culture 70,000-79,000 cfu/ml Escherichia coli (A)        Susceptibility    Escherichia coli - TARIQ     ZID Performed Yes       Amoxicillin + Clavulanate <=8/4 Susceptible ug/ml     Ampicillin ($$) >16.00 Resistant ug/ml     Ampicillin + Sulbactam ($) 16/8 Intermediate ug/ml     Aztreonam ($$$)  <=4 Susceptible ug/ml     Cefazolin ($) <=2.00 Susceptible ug/ml     Ciprofloxacin ($)  <=0.25 Susceptible ug/ml     Ertapenem ($$$) <=0.5 Susceptible ug/ml     Gentamicin ($$) <=2 Susceptible ug/ml     Levofloxacin ($) <=0.50 Susceptible ug/ml     Nitrofurantoin <=32 Susceptible ug/ml     Piperacillin + Tazobactam ($$$) <=8 Susceptible ug/ml     Tetracycline <=4 Susceptible ug/ml     Tobramycin ($) <=2 Susceptible ug/ml     Trimethoprim + Sulfamethoxazole ($$$) >2/38 Resistant ug/ml   Urinalysis with microscopic   Result Value Ref Range    Color, UA Light Orange     Clarity, UA Extra Turbid     Specific Gravity, UA 1.032 (H) 1.003 - 1.030    pH, UA 6.5 4.5, 5.0, 5.5, 6.0, 6.5, 7.0, 7.5, 8.0    Leukocytes, UA Trace (A) Negative    Nitrite, UA Negative Negative    Protein, UA 30 (1+) (A) Negative mg/dl    Glucose, UA Negative Negative mg/dl    Ketones, UA Negative Negative mg/dl    Urobilinogen, UA <2.0 <2.0 mg/dl mg/dl    Bilirubin, UA Negative Negative    Occult Blood, UA Negative Negative    RBC, UA 1-2 None Seen, 1-2 /hpf    WBC, UA 1-2 None Seen, 1-2 /hpf    Epithelial Cells Occasional None Seen, Occasional /hpf    Bacteria, UA None Seen None Seen, Occasional /hpf    MUCUS THREADS Moderate (A) None Seen   POCT wet mount   Result Value Ref Range    Yeast, Wet Prep negative     Clue Cells negative     Trich, Wet Prep negative        FIRST TRIMESTER OBSTETRIC ULTRASOUND     Patient's last menstrual period was  01/01/2024 (exact date).    INDICATION: Establish Gestational Age       FINDINGS:  See imaging report for details     Additional Findings: none     FHR: 160    IMPRESSION:    Single intrauterine pregnancy of 10 weeks 0 days gestational age  Fetal cardiac activity detected.  No adnexal masses seen.  EDC by LMP: 10/7/24  EDC by this Ultrasound: 10/4/24    Assigning a Final LICO  Please choose how you are assigning the LICO: The gestational age by LMP is 9w 0d - 13w 6d and demonstrates 7 or fewer days difference from the gestational age by CRL, therefore the final LICO will be based on the LMP    Final LICO: 10/7/24 by LMP.    Pooja Mar DO  [address] [place] [address] [facility] [address] [address]  Dept: [phone]  Dept Fax: [phone]  Ultrasound Probe Disinfection    A transvaginal ultrasound was performed.   Prior to use, disinfection was performed with High Level Disinfection Process ([employer]).  Probe serial number: [id number]  was used.          Assessment/Plan:    Early pregnancy at 9 weeks 4 days with a calculated LICO of 10/7/24 based on LMP    - Genetic screening options reviewed. She is interested in NIPT, so MFM referral placed  - Prenatal care reviewed  - Pregnancy precautions reviewed  - Zofran RX provided per her request. Reviewed theoretical risks associated- she understands.     RTO for OB interview and PN-1 visit

## 2024-03-15 DIAGNOSIS — L29.9 PRURITUS: ICD-10-CM

## 2024-03-15 RX ORDER — HYDROXYZINE HYDROCHLORIDE 25 MG/1
25 TABLET, FILM COATED ORAL EVERY 6 HOURS PRN
Qty: 30 TABLET | Refills: 0 | Status: SHIPPED | OUTPATIENT
Start: 2024-03-15

## 2024-03-18 DIAGNOSIS — L29.9 PRURITUS: ICD-10-CM

## 2024-03-18 RX ORDER — HYDROXYZINE HYDROCHLORIDE 25 MG/1
25 TABLET, FILM COATED ORAL EVERY 6 HOURS PRN
Qty: 30 TABLET | Refills: 0 | OUTPATIENT
Start: 2024-03-18

## 2024-03-19 ENCOUNTER — TELEPHONE (OUTPATIENT)
Age: 29
End: 2024-03-19

## 2024-03-19 NOTE — TELEPHONE ENCOUNTER
Patient called asking for an expected due date letter to be faxed to [facility] at [phone].

## 2024-03-22 ENCOUNTER — INITIAL PRENATAL (OUTPATIENT)
Dept: OBGYN CLINIC | Facility: CLINIC | Age: 29
End: 2024-03-22
Payer: COMMERCIAL

## 2024-03-22 DIAGNOSIS — O21.9 NAUSEA AND VOMITING DURING PREGNANCY: Primary | ICD-10-CM

## 2024-03-22 DIAGNOSIS — Z31.430 ENCOUNTER OF FEMALE FOR TESTING FOR GENETIC DISEASE CARRIER STATUS FOR PROCREATIVE MANAGEMENT: Primary | ICD-10-CM

## 2024-03-22 DIAGNOSIS — Z36.9 UNSPECIFIED ANTENATAL SCREENING: ICD-10-CM

## 2024-03-22 PROCEDURE — 99211 OFF/OP EST MAY X REQ PHY/QHP: CPT

## 2024-03-22 RX ORDER — METOCLOPRAMIDE 5 MG/1
5 TABLET ORAL EVERY 6 HOURS PRN
Qty: 20 TABLET | Refills: 0 | Status: SHIPPED | OUTPATIENT
Start: 2024-03-22

## 2024-03-22 NOTE — PATIENT INSTRUCTIONS
Congratulations!! Please review our Pregnancy Essential Guide and [facility] L&D Virtual tour from our networks website.     [facility] Pregnancy Essentials Guide  [facility] ([email or web address])     [facility] - Virtual Tour ([email or web address])        Pregnancy at 11 to 14 Weeks   AMBULATORY CARE:   Changes happening to your body:  You are now at the end of your first trimester and entering your second trimester. Morning sickness usually goes away by this time. You may have other symptoms such as fatigue, frequent urination, and headaches. You may have gained 2 to 4 pounds by now.  Seek care immediately if:   You have pain or cramping in your abdomen or low back.    You have heavy vaginal bleeding or clotting.    You pass material that looks like tissue or large clots. Collect the material and bring it with you.    Call your doctor or obstetrician if:   You cannot keep food or drinks down, and you are losing weight.    You have light vaginal bleeding.    You have chills or a fever.    You have vaginal itching, burning, or pain.    You have yellow, green, white, or foul-smelling vaginal discharge.    You have pain or burning when you urinate, less urine than usual, or pink or bloody urine.    You have questions or concerns about your condition or care.    How to care for yourself at this stage of your pregnancy:       Get plenty of rest.  You may feel more tired than normal. You may need to take naps or go to bed earlier.    Manage nausea and vomiting.  Avoid fatty and spicy foods. Eat small meals throughout the day instead of large meals. Brittany may help to decrease nausea. Ask your healthcare provider about other ways of decreasing nausea and vomiting.    Eat a variety of healthy foods.  Healthy foods include fruits, vegetables, whole-grain breads, low-fat dairy foods, beans, lean meats, and fish. Drink liquids as directed. Ask how much liquid to drink each day and which liquids are best for you.  Limit caffeine to less than 200 milligrams each day. Limit your intake of fish to 2 servings each week. Choose fish low in mercury such as canned light tuna, shrimp, salmon, cod, or tilapia. Do not  eat fish high in mercury such as swordfish, tilefish, chayo mackerel, and shark.         Take prenatal vitamins as directed.  Your need for certain vitamins and minerals, such as folic acid, increases during pregnancy. Prenatal vitamins provide some of the extra vitamins and minerals you need. Prenatal vitamins may also help to decrease the risk of certain birth defects.         Do not smoke.  Smoking increases your risk of a miscarriage and other health problems during your pregnancy. Smoking can cause your baby to be born too early or weigh less at birth. Ask your healthcare provider for information if you need help quitting.    Do not drink alcohol.  Alcohol passes from your body to your baby through the placenta. It can affect your baby's brain development and cause fetal alcohol syndrome (FAS). FAS is a group of conditions that causes mental, behavior, and growth problems.    Talk to your healthcare provider before you take any medicines.  Many medicines may harm your baby if you take them when you are pregnant. Do not take any medicines, vitamins, herbs, or supplements without first talking to your healthcare provider. Never use illegal or street drugs (such as marijuana or cocaine) while you are pregnant.  Safety tips during pregnancy:   Avoid hot tubs and saunas.  Do not use a hot tub or sauna while you are pregnant, especially during your first trimester. Hot tubs and saunas may raise your baby's temperature and increase the risk of birth defects.    Avoid toxoplasmosis.  This is an infection caused by eating raw meat or being around infected cat feces. It can cause birth defects, miscarriages, and other problems. Wash your hands after you touch raw meat. Make sure any meat is well-cooked before you eat it. Avoid  raw eggs and unpasteurized milk. Use gloves or ask someone else to clean your cat's litter box while you are pregnant.    Changes happening with your baby:  Your baby has fully formed fingernails and toenails. Your baby's heartbeat can now be heard. Ask your healthcare provider if you can listen to your baby's heartbeat. By week 14, your baby is over 4 inches long from the top of the head to the rump (baby's bottom). Your baby weighs over 3 ounces.  Prenatal care:  Prenatal care is a series of visits with your healthcare provider throughout your pregnancy. During the first 28 weeks of your pregnancy, you will see your healthcare provider 1 time each month. Prenatal care can help prevent problems during pregnancy and childbirth. Your healthcare provider will check your blood pressure and weight. Your baby's heart rate will also be checked. You may also need the following at some visits:  A pelvic exam  allows your healthcare provider to see your cervix (the bottom part of your uterus). Your healthcare provider will use a speculum to open your vagina. He or she will check the size and shape of your uterus.    Blood tests  may be done to check for any of the following:    Gestational diabetes or anemia (low iron level)    Blood type or Rh factor, or certain birth defects    Immunity to certain diseases, such as chickenpox or rubella    An infection, such as a sexually transmitted infection, HIV, or hepatitis B    Hepatitis B  may need to be prevented or treated. Hepatitis B is inflammation of the liver caused by the hepatitis B virus (HBV). HBV can spread from a mother to her baby during delivery. You will be checked for HBV as early as possible in the first trimester of each pregnancy. You need the test even if you received the hepatitis B vaccine or were tested before. You may need to have an HBV infection treated before you give birth.    Urine tests  may also be done to check for sugar and protein. These can be  signs of gestational diabetes or preeclampsia. Urine tests may also be done to check for signs of infection.    A fetal ultrasound  shows pictures of your baby inside your uterus. The pictures are used to check your baby's development, movement, and position.         Genetic disorder screening tests  may be offered to you. These tests check your baby's risk for genetic disorders such as Down syndrome. A screening test includes a blood test and ultrasound.    Follow up with your doctor or obstetrician as directed:  Go to all prenatal visits. Write down your questions so you remember to ask them during your visits.  © Copyright [employer] 2023 Information is for End User's use only and may not be sold, redistributed or otherwise used for commercial purposes.  The above information is an  only. It is not intended as medical advice for individual conditions or treatments. Talk to your doctor, nurse or pharmacist before following any medical regimen to see if it is safe and effective for you.  Congratulations!! Please review our Pregnancy Essential Guide and [facility] L&D Virtual tour from our networks website.     [facility] Pregnancy Essentials Guide  [facility] ([email or web address])     [facility] - Virtual Tour ([place])

## 2024-03-22 NOTE — PROGRESS NOTES
OB INTAKE INTERVIEW  Patient is 28 y.o.y.o. who presents for OB intake at 11-4 wks  She is accompanied by self during this encounter  The father of her baby (Tru) is involved in the pregnancy and is supportive of pregnancy.      1 IAB  Last Menstrual Period: 24  Ultrasound: Measured 9 weeks 4 days on 3/8/24  Estimated Date of Delivery: 10/7/24 via LMP     Signs/Symptoms of Pregnancy  Current pregnancy symptoms: N & V zofran not effective.  Constipation no  Headaches no  Cramping/spotting no  PICA cravings no    Diabetes-    If patient has 1 or more, please order early 1 hour GTT  History of GDM no  BMI >35 no  History of PCOS or current metformin use no  History of LGA/macrosomic infant (4000g/9lbs) no    If patient has 2 or more, please order early 1 hour GTT  BMI>30 no  AMA no  First degree relative with type 2 diabetes no  History of chronic HTN, hyperlipidemia, elevated A1C no  High risk race (, , ,  or ) YES    Hypertension- if you answer yes to any of the following, please order baseline preeclampsia labs (cbc, comprehensive metabolic panel, urine protein creatinine ratio, uric acid)  History of of chronic HTN no  History of gestational HTN no  History of preeclampsia, eclampsia, or HELLP syndrome no  History of diabetes no  History of lupus, autoimmune disease, kidney disease no    Thyroid- if yes order TSH with reflex T4  History of thyroid disease no    Bleeding Disorder or Hx of DVT-patient or first degree relative with history of. Order the following if not done previously.   (Factor V, antithrombin III, prothrombin gene mutation, protein C and S Ag, lupus anticoagulant, anticardiolipin, beta-2 glycoprotein)   no    OB/GYN-  History of abnormal pap smear no       Date of last pap smear 2022  wnl  History of HPV no  History of Herpes/HSV no  History of other STI (gonorrhea, chlamydia, trich) no  History of prior  YES  History of  prior  no  History of  delivery prior to 36 weeks 6 days no  History of blood transfusion no  Ok for blood transfusion yes    Substance screening- if yes outside of tobacco for her or anyone in her home-order urine drug screen  History of tobacco use no  Currently using tobacco no  Currently using alcohol no  Presently using drugs no  Past drug use  no  IV drug use- no  Partner drug use no  Parent/Family drug use no    MRSA Screening-   Does the pt have a hx of MRSA? no    Immunizations:  Influenza vaccine given this season no  Discussed Tdap vaccine yes  Discussed COVID Vaccine yes  - pt has not received vaccines.     Genetic/MFM-  Do you or your partner have a history of any of the following in yourselves or first degree relatives?  Cystic fibrosis no  Spinal muscular atrophy no  Hemoglobinopathy/Sickle Cell/Thalassemia no  Fragile X Intellectual Disability no    If yes, discuss Carrier Screening and recommend consultation with MFM/Genetic Counseling and place specific [facility] Referral for.    If no, discuss Carrier Screening being completed once in a lifetime as a standard of care lab test. Place orders for Cystic Fibrosis Gene Test ([id number]) and Spinal Muscular Atrophy DNA ([id number])      Appointment for Nuchal Translucency Ultrasound at [facility] scheduled for 3/26/24    Interview education  [facility] Pregnancy Essentials Book reviewed, discussed and attached to their AVS yes    Nurse/Family Partnership- patient may qualify no; referral placed no    Prenatal lab work scripts labs drawn through [facility]  Extra labs ordered:  CF, SMA    Aspirin/Preeclampsia Screen    Risk Level Risk Factor Recommendation   LOW Prior Uncomplicated full-term delivery YES No Aspirin recommendation        MODERATE Nulliparity no Recommend low-dose aspirin if     BMI>30 no 2 or more moderate risk factors    Family History Preeclampsia (mother/sister) YES     35yr old or greater no      or Low Socioeconomic YES     IVF  Pregnancy  no     Personal History Risks (low birth weight, prior adverse preg outcome, >10yr preg interval) no         HIGH History of Preeclampsia no Recommend low-dose aspirin if     Multifetal gestation no 1 or more high risk factors    Chronic HTN no     Type 1 or 2 Diabetes no     Renal Disease no     Autoimmune Disease  no      Contraindications to ASA therapy:  NSAID/ ASA allergy: no  Nasal polyps: no  Asthma with history of ASA induced bronchospasm: no  Relative contraindications:  History of GI bleed: no  Active peptic ulcer disease: no  Severe hepatic dysfunction: no    Patient should be recommended to take ASA 162mg during this pregnancy from 12-36wks to lower her risk of preeclampsia: yes - pt informed.      The patient has a history now or in prior pregnancy notable for:   2 vag deliveries, 1 IAB,   Hx of asthma,  Hx of PP depression,/depression- zoloft ,  BMI 24.0,  0 Positive,  PN bldwk drawn through [facility].         Details that I feel the provider should be aware of: Pt desires CF & SMA testing.- ordered in epic.     PN1 visit scheduled. The patient was oriented to our practice, reviewed delivering physicians and [facility] for Delivery. All questions were answered. PN in-person interview completed.  Pregnancy was unplanned, but welcomed.  Pt & partner, Tru have two other children together; ages 2 & 5.   PN bldwk drawn at [facility].  Per pt's request , CF & SMA testing ordered.  Advised pt to await authorization p.c. prior to having bldwk drawn. Pt has appts scheduled for 3/26, PNC, 4/3 PN1.  Advised pt to call with any further questions/concerns.       Interviewed by: Steffi Winn RN, CLC

## 2024-04-19 DIAGNOSIS — E55.9 VITAMIN D DEFICIENCY: ICD-10-CM

## 2024-04-19 RX ORDER — ERGOCALCIFEROL 1.25 MG/1
50000 CAPSULE ORAL WEEKLY
Qty: 12 CAPSULE | Refills: 0 | Status: SHIPPED | OUTPATIENT
Start: 2024-04-19 | End: 2024-07-06

## 2024-04-25 DIAGNOSIS — J30.89 ENVIRONMENTAL AND SEASONAL ALLERGIES: ICD-10-CM

## 2024-04-26 RX ORDER — FLUTICASONE PROPIONATE 50 MCG
SPRAY, SUSPENSION (ML) NASAL
Qty: 16 G | Refills: 1 | Status: SHIPPED | OUTPATIENT
Start: 2024-04-26

## 2024-05-03 DIAGNOSIS — J30.89 ENVIRONMENTAL AND SEASONAL ALLERGIES: Primary | ICD-10-CM

## 2024-05-03 RX ORDER — CETIRIZINE HYDROCHLORIDE 10 MG/1
10 TABLET ORAL DAILY
Qty: 30 TABLET | Refills: 3 | Status: SHIPPED | OUTPATIENT
Start: 2024-05-03 | End: 2024-05-07 | Stop reason: SDUPTHER

## 2024-05-07 DIAGNOSIS — J30.89 ENVIRONMENTAL AND SEASONAL ALLERGIES: ICD-10-CM

## 2024-05-07 RX ORDER — CETIRIZINE HYDROCHLORIDE 10 MG/1
10 TABLET ORAL DAILY
Qty: 90 TABLET | Refills: 1 | Status: SHIPPED | OUTPATIENT
Start: 2024-05-07

## 2024-05-07 NOTE — TELEPHONE ENCOUNTER
Patient called for medication refill for cetirizine 10mg one daily.    Asking to change the pharmacy to  send to [facility] [place]. Please instead of [facility] in [place].

## 2024-05-09 ENCOUNTER — OFFICE VISIT (OUTPATIENT)
Dept: FAMILY MEDICINE CLINIC | Facility: CLINIC | Age: 29
End: 2024-05-09
Payer: COMMERCIAL

## 2024-05-09 VITALS
BODY MASS INDEX: 24.1 KG/M2 | DIASTOLIC BLOOD PRESSURE: 72 MMHG | HEART RATE: 78 BPM | TEMPERATURE: 98.2 F | OXYGEN SATURATION: 99 % | HEIGHT: 68 IN | WEIGHT: 159 LBS | SYSTOLIC BLOOD PRESSURE: 120 MMHG

## 2024-05-09 DIAGNOSIS — J30.89 ENVIRONMENTAL AND SEASONAL ALLERGIES: ICD-10-CM

## 2024-05-09 DIAGNOSIS — R05.1 ACUTE COUGH: ICD-10-CM

## 2024-05-09 DIAGNOSIS — J02.9 SORE THROAT: Primary | ICD-10-CM

## 2024-05-09 LAB
S PYO AG THROAT QL: NEGATIVE
SARS-COV-2 AG UPPER RESP QL IA: NEGATIVE
VALID CONTROL: NORMAL

## 2024-05-09 PROCEDURE — 99213 OFFICE O/P EST LOW 20 MIN: CPT | Performed by: NURSE PRACTITIONER

## 2024-05-09 PROCEDURE — 87811 SARS-COV-2 COVID19 W/OPTIC: CPT | Performed by: NURSE PRACTITIONER

## 2024-05-09 PROCEDURE — 87070 CULTURE OTHR SPECIMN AEROBIC: CPT | Performed by: NURSE PRACTITIONER

## 2024-05-09 PROCEDURE — 87880 STREP A ASSAY W/OPTIC: CPT | Performed by: NURSE PRACTITIONER

## 2024-05-09 RX ORDER — PROMETHAZINE HYDROCHLORIDE 12.5 MG/1
SUPPOSITORY RECTAL
COMMUNITY
Start: 2024-04-09

## 2024-05-09 NOTE — PROGRESS NOTES
Name: Susana Carl      : 1995      MRN: [id number]  Encounter Provider: JONAS Fernandez  Encounter Date: 2024   Encounter department: [facility] [address]    Assessment & Plan     1. Sore throat  Comments:  Strep A negative in office, will send culture. Advised increased hydration, soft foods, voice rest, tea with hiney, moist/humidified air. Tylenol prn.  Orders:  -     Throat culture; Future  -     POCT rapid ANTIGEN strepA    2. Acute cough  Comments:  Covid negative. Advised increased hydration, steam, humidified air, To start Flonase and zyrtec as prescribed for allergies/PND.  Orders:  -     POCT Rapid Covid Ag    3. Environmental and seasonal allergies  Assessment & Plan:  Has been off allergy medications for several weeks.  Notes increased congestion, postnasal drip, cough.  Discussed medications that are safe to take in pregnancy.  To restart sertraline as prescribed and Flonase as prescribed.  Discussed importance of humidified/moist air, avoiding allergens when possible, steam, saline rinses twice daily.             Subjective      Patient presents to the office for evaluation of sore throat and cough.  Patient is 18 weeks pregnant.  Began 2 days ago with nausea.  Then developed congestion and post nasal drip.  Notes cough and sore throat, + frontal headache.  History of allergies and asthma, has not been on allergy medications.  Denies fever, ear pain, chills, body aches.   Denies decreased PO intake, weakness, syncope, vaginal bleeding, lower abdominal cramping.      Review of Systems   Constitutional:  Negative for chills and fever.   HENT:  Positive for congestion, postnasal drip, sinus pressure and sore throat. Negative for ear pain, rhinorrhea, sinus pain, trouble swallowing and voice change.    Eyes:  Negative for photophobia and visual disturbance.   Respiratory:  Positive for cough. Negative for chest tightness, shortness of breath and  wheezing.    Cardiovascular:  Negative for chest pain and palpitations.   Gastrointestinal:  Positive for nausea and vomiting. Negative for abdominal pain.   Genitourinary:  Negative for decreased urine volume, pelvic pain and vaginal bleeding.   Musculoskeletal:  Negative for back pain and neck pain.   Skin:  Negative for color change and rash.   Neurological:  Positive for headaches. Negative for dizziness, syncope, weakness, light-headedness and numbness.   Hematological:  Negative for adenopathy.   Psychiatric/Behavioral:  Negative for agitation and confusion.        Current Outpatient Medications on File Prior to Visit   Medication Sig    Albuterol Sulfate (ProAir RespiClick) 108 (90 Base) MCG/ACT AEPB Inhale 2 puffs as needed (sob/wheezing)    cetirizine (ZyrTEC) 10 mg tablet Take 1 tablet (10 mg total) by mouth daily    EPINEPHrine (EPIPEN) 0.3 mg/0.3 mL SOAJ Inject 0.3 mL (0.3 mg total) into a muscle once for 1 dose    fluticasone (FLONASE) 50 mcg/act nasal spray instill 2 sprays into each nostril daily    Prenatal Vit-Fe Fumarate-FA (Prenatal Vitamin) 27-0.8 MG TABS Take by mouth    promethazine (PHENERGAN) 12.5 mg suppository INSERT 1 SUPPOSITORY (12.5 MG TOTAL) INTO THE RECTUM 2 (TWO) TIMES A DAY FOR 10 DAYS.    sertraline (ZOLOFT) 25 mg tablet take 1 and 1/2 tablets by mouth daily    metoclopramide (REGLAN) 5 mg tablet Take 1 tablet (5 mg total) by mouth every 6 (six) hours as needed (nausea/vomiting) (Patient not taking: Reported on 5/9/2024)    [DISCONTINUED] azelastine (OPTIVAR) 0.05 % ophthalmic solution  (Patient not taking: Reported on 3/8/2024)    [DISCONTINUED] ergocalciferol (VITAMIN D2) 50,000 units Take 1 capsule (50,000 Units total) by mouth once a week for 12 doses (Patient not taking: Reported on 5/9/2024)    [DISCONTINUED] hydrOXYzine HCL (ATARAX) 25 mg tablet Take 1 tablet (25 mg total) by mouth every 6 (six) hours as needed for itching (Patient not taking: Reported on 3/22/2024)     "[DISCONTINUED] norethindrone (Jencycla) 0.35 MG tablet take 1 tablet by mouth once daily (Patient not taking: Reported on 2/21/2024)    [DISCONTINUED] ondansetron (ZOFRAN) 4 mg tablet Take 1 tablet (4 mg total) by mouth every 8 (eight) hours as needed for nausea or vomiting (Patient not taking: Reported on 5/9/2024)    [DISCONTINUED] ondansetron (ZOFRAN-ODT) 4 mg disintegrating tablet dissolve 1 tablet ON TONGUE every 8 hours if needed for nausea OR vomiting (Patient not taking: Reported on 3/22/2024)    [DISCONTINUED] triamcinolone (KENALOG) 0.1 % ointment apply topically to affected area twice a day (Patient not taking: Reported on 3/22/2024)       Objective     /72   Pulse 78   Temp 98.2 °F (36.8 °C)   Ht 5' 8\" (1.727 m)   Wt 72.1 kg (159 lb)   LMP 01/01/2024 (Exact Date)   SpO2 99%   BMI 24.18 kg/m²     Physical Exam  Vitals reviewed.   Constitutional:       General: She is not in acute distress.     Appearance: She is well-developed. She is not ill-appearing.   HENT:      Head: Normocephalic and atraumatic.      Right Ear: Tympanic membrane and ear canal normal.      Left Ear: Tympanic membrane and ear canal normal.      Nose: Congestion present.      Right Turbinates: Enlarged.      Left Turbinates: Enlarged.      Right Sinus: No maxillary sinus tenderness or frontal sinus tenderness.      Left Sinus: No maxillary sinus tenderness or frontal sinus tenderness.      Mouth/Throat:      Mouth: Mucous membranes are moist.      Pharynx: Oropharynx is clear. Uvula midline. Posterior oropharyngeal erythema present. No oropharyngeal exudate.      Tonsils: No tonsillar exudate or tonsillar abscesses. 1+ on the right. 1+ on the left.   Eyes:      Conjunctiva/sclera: Conjunctivae normal.      Pupils: Pupils are equal, round, and reactive to light.   Cardiovascular:      Rate and Rhythm: Normal rate and regular rhythm.      Heart sounds: Normal heart sounds. No murmur heard.  Pulmonary:      Effort: Pulmonary " effort is normal.      Breath sounds: Normal breath sounds.   Abdominal:      General: Bowel sounds are normal.      Palpations: Abdomen is soft.   Musculoskeletal:      Cervical back: Normal range of motion and neck supple.   Lymphadenopathy:      Cervical: No cervical adenopathy.   Skin:     General: Skin is warm and dry.      Capillary Refill: Capillary refill takes less than 2 seconds.   Neurological:      General: No focal deficit present.      Mental Status: She is alert and oriented to person, place, and time.   Psychiatric:         Mood and Affect: Mood normal.         Behavior: Behavior normal.       JONAS Fernandez

## 2024-05-09 NOTE — ASSESSMENT & PLAN NOTE
Has been off allergy medications for several weeks.  Notes increased congestion, postnasal drip, cough.  Discussed medications that are safe to take in pregnancy.  To restart sertraline as prescribed and Flonase as prescribed.  Discussed importance of humidified/moist air, avoiding allergens when possible, steam, saline rinses twice daily.

## 2024-05-10 ENCOUNTER — TELEPHONE (OUTPATIENT)
Dept: OBGYN CLINIC | Facility: CLINIC | Age: 29
End: 2024-05-10

## 2024-05-10 NOTE — TELEPHONE ENCOUNTER
"----- Message from Steffi Winn RN sent at 3/22/2024  2:21 PM EDT -----  Regardinnd trimester call  -  Pt \"switched her OB care to [facility], due to not wanting to deliver at [place] \".   "

## 2024-05-11 LAB — BACTERIA THROAT CULT: NORMAL

## 2024-05-16 RX ORDER — SERTRALINE HYDROCHLORIDE 25 MG/1
37.5 TABLET, FILM COATED ORAL DAILY
Qty: 135 TABLET | Refills: 1 | Status: SHIPPED | OUTPATIENT
Start: 2024-05-16

## 2024-05-24 ENCOUNTER — TELEPHONE (OUTPATIENT)
Age: 29
End: 2024-05-24

## 2024-05-28 ENCOUNTER — TELEPHONE (OUTPATIENT)
Age: 29
End: 2024-05-28

## 2024-05-28 NOTE — TELEPHONE ENCOUNTER
Patient was told to call Renee Richmond if she still wasn't feeling well. She still has cough with a heavy chest, and nasal congestion.  She is asking if Renee can call something in for her to the pharmacy, [facility] in [facility] or does she need to see her.  Patent would like a call back.

## 2024-05-29 ENCOUNTER — OFFICE VISIT (OUTPATIENT)
Dept: FAMILY MEDICINE CLINIC | Facility: CLINIC | Age: 29
End: 2024-05-29
Payer: COMMERCIAL

## 2024-05-29 VITALS
DIASTOLIC BLOOD PRESSURE: 60 MMHG | HEIGHT: 68 IN | SYSTOLIC BLOOD PRESSURE: 102 MMHG | TEMPERATURE: 99.1 F | RESPIRATION RATE: 14 BRPM | OXYGEN SATURATION: 99 % | HEART RATE: 80 BPM | BODY MASS INDEX: 24.52 KG/M2 | WEIGHT: 161.8 LBS

## 2024-05-29 DIAGNOSIS — H66.002 NON-RECURRENT ACUTE SUPPURATIVE OTITIS MEDIA OF LEFT EAR WITHOUT SPONTANEOUS RUPTURE OF TYMPANIC MEMBRANE: Primary | ICD-10-CM

## 2024-05-29 DIAGNOSIS — R05.1 ACUTE COUGH: ICD-10-CM

## 2024-05-29 PROCEDURE — 99213 OFFICE O/P EST LOW 20 MIN: CPT | Performed by: NURSE PRACTITIONER

## 2024-05-29 RX ORDER — AZITHROMYCIN 250 MG/1
TABLET, FILM COATED ORAL
Qty: 6 TABLET | Refills: 0 | Status: SHIPPED | OUTPATIENT
Start: 2024-05-29 | End: 2024-06-02

## 2024-05-29 NOTE — PROGRESS NOTES
Prenatal Visit  Subjective:   Susana is a 28 y.o.  21w2d here for Routine Prenatal Visit ([facility] 10/7/24/Blood type O+/Labs /Flu /Urine /Denies LOF, VB, or CTX./Pt has +FM/Questions or concerns-  )  Transfer of care from [facility]  Actually started pregnancy with [facility], then went to [facility] and now coming back. Not happy with communication at [facility]-particularly [facility]  She is Largely up to date on PNC  Routine PNBW completed.  Up to date on pap and vaginal cultures  NT and NIPT nml//low risk  NO afp done yet  Has level 2 US scheduled- but will need to be switched to our [facility]  Hx of 2 prior  w/o complications and 1 termination. Otherwise overall healthy    She denies any cramping, LOF/unusual discharge or VB.  She has started to feel fetal movement..  Still having some nausea, manging with reglan.  Currently on zpak for ear infection      Objective:  Vitals:    24 0708   BP: 108/64   Pulse: 76     Pregravid Weight/BMI: 68 kg (150 lb) (BMI 22.81)  Current Weight: 73.5 kg (162 lb)   Total Weight Gain: 5.443 kg (12 lb)     Fetal Heart Rate: 145    OBGyn Exam  Physical Exam  Fetal Heart Rate: 145 , Fundal Height (cm): 20 cm  General: Not in acute distress and appearing well nourished and well groomed  Genitourinary:          Pelvic exam exam deferred   Cardiovascular:   Rate and Rhythm: Normal rate.   Pulmonary:    Effort: normal, not labored  Abdominal:  Abdomen is soft and gravid    Musculoskeletal: Active movement of all extremities, no gross limitations of ROM     Edema:None noted  Neurological:   Mental Status: She is alert and oriented to person, place, and time.   Skin: General: Skin is warm and dry.   Psychiatric:    Mood and Affect: Mood normal.      Behavior: Behavior normal    Assessment & Plan:          1. Prenatal care, subsequent pregnancy, second trimester  -     POCT urine dip  -     Ambulatory Referral to Maternal Fetal Medicine; Future; Expected date: 2024  2. Encounter for  screening  -      Alpha fetoprotein, maternal; Future  3. 21 weeks gestation of pregnancy  Assessment & Plan:   @ 21w3d transfer from [facility]  Overall up to date on PNC.  Routine PNBW completed.  Up to date on pap and vaginal cultures  NT and NIPT nml/low risk  NO afp done yet- ordered and to be completed today or tomorrow.  Has level 2 US scheduled- but will need to be switched to our [facility]- referral given    Feeling well overall. Occasional nausea, managed with reglan prescription PRN.no wt loss.  Also currently in Zpak for ear infection  + FM  No c/o cramping or ctx.   + FHR today.  Reviewed [facility] ctx and s/s PTL.   Precautions reviewed to call for - Vaginal bldg, LOF, abnormal d/c or > 4 ctx in an hour.   FM not consistent until around 28 wks.   4. Depression affecting pregnancy  Assessment & Plan:  Hx of PPD and has been maintained on zoloft since last pregnancy. Feels this is effective and denies SI/HI or worsening symptoms with pregnancy.       JONAS Hebert  2024

## 2024-05-29 NOTE — PATIENT INSTRUCTIONS
Ear Infection   AMBULATORY CARE:   An ear infection  is also called otitis media. Blocked or swollen eustachian tubes can cause an infection. Eustachian tubes connect the middle ear to the back of the nose and throat. They drain fluid from the middle ear. You may have a buildup of fluid in your ear. Germs build up in the fluid and infection develops.       Common signs and symptoms:   Ear pain    Fever or a headache    Trouble hearing    Ringing or buzzing in your ear    Plugged ear or an ear that feels full    Dizziness    Nausea or vomiting    Seek care immediately if:   You have clear fluid coming from your ear.    You have a stiff neck, headache, and a fever.    Call your doctor if:   You see blood or pus draining from your ear.    Your ear pain gets worse or does not go away, even after treatment.    The outside of your ear is red or swollen.    You are vomiting or have diarrhea.    You have questions or concerns about your condition or care.    Medicines:  You may  need any of the following:  Acetaminophen  decreases pain and fever. It is available without a doctor's order. Ask how much to take and how often to take it. Follow directions. Read the labels of all other medicines you are using to see if they also contain acetaminophen, or ask your doctor or pharmacist. Acetaminophen can cause liver damage if not taken correctly.    NSAIDs , such as ibuprofen, help decrease swelling, pain, and fever. This medicine is available with or without a doctor's order. NSAIDs can cause stomach bleeding or kidney problems in certain people. If you take blood thinner medicine, always ask your healthcare provider if NSAIDs are safe for you. Always read the medicine label and follow directions.    Ear drops  may contain medicine to decrease pain and inflammation.    Antibiotics  help treat a bacterial infection.    Self-care:   Apply heat  on your ear for 15 to 20 minutes, 3 to 4 times a day or as directed. You can apply heat  with an electric heating pad, hot water bottle, or warm compress. Always put a cloth between your skin and the heat pack to prevent burns. Heat helps decrease pain.    Apply ice  on your ear for 15 to 20 minutes, 3 to 4 times a day for 2 days or as directed. Use an ice pack, or put crushed ice in a plastic bag. Cover it with a towel before you apply it to your ear. Ice decreases swelling and pain.    Prevent an ear infection:   Wash your hands often  to help prevent the spread of germs. Ask everyone in your house to wash their hands with soap and water. Ask them to wash after they use the bathroom or change a diaper. Remind them to wash before they prepare or eat food.         Stay away from people who are ill.  Some germs spread easily and quickly through contact.    Follow up with your doctor as directed:  Write down your questions so you remember to ask them during your visits.  © Copyright [employer] 2023 Information is for End User's use only and may not be sold, redistributed or otherwise used for commercial purposes.  The above information is an  only. It is not intended as medical advice for individual conditions or treatments. Talk to your doctor, nurse or pharmacist before following any medical regimen to see if it is safe and effective for you.
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
## 2024-05-30 ENCOUNTER — ULTRASOUND (OUTPATIENT)
Dept: OBGYN CLINIC | Facility: MEDICAL CENTER | Age: 29
End: 2024-05-30
Payer: COMMERCIAL

## 2024-05-30 VITALS
HEIGHT: 68 IN | HEART RATE: 76 BPM | DIASTOLIC BLOOD PRESSURE: 64 MMHG | WEIGHT: 162 LBS | SYSTOLIC BLOOD PRESSURE: 108 MMHG | BODY MASS INDEX: 24.55 KG/M2

## 2024-05-30 DIAGNOSIS — Z36.9 ENCOUNTER FOR ANTENATAL SCREENING: ICD-10-CM

## 2024-05-30 DIAGNOSIS — F32.A DEPRESSION AFFECTING PREGNANCY: ICD-10-CM

## 2024-05-30 DIAGNOSIS — Z34.82 PRENATAL CARE, SUBSEQUENT PREGNANCY, SECOND TRIMESTER: Primary | ICD-10-CM

## 2024-05-30 DIAGNOSIS — O21.9 NAUSEA AND VOMITING DURING PREGNANCY: ICD-10-CM

## 2024-05-30 DIAGNOSIS — O99.340 DEPRESSION AFFECTING PREGNANCY: ICD-10-CM

## 2024-05-30 DIAGNOSIS — Z3A.21 21 WEEKS GESTATION OF PREGNANCY: ICD-10-CM

## 2024-05-30 PROBLEM — Z3A.14 14 WEEKS GESTATION OF PREGNANCY: Status: ACTIVE | Noted: 2024-04-08

## 2024-05-30 PROBLEM — B37.9 YEAST INFECTION: Status: RESOLVED | Noted: 2023-05-05 | Resolved: 2024-05-30

## 2024-05-30 PROBLEM — Z33.2 ABORTION IN FIRST TRIMESTER: Status: RESOLVED | Noted: 2023-03-22 | Resolved: 2024-05-30

## 2024-05-30 PROBLEM — Z30.41 ENCOUNTER FOR SURVEILLANCE OF CONTRACEPTIVE PILLS: Status: RESOLVED | Noted: 2023-08-28 | Resolved: 2024-05-30

## 2024-05-30 PROBLEM — R51.9 DAILY HEADACHE: Status: RESOLVED | Noted: 2022-02-21 | Resolved: 2024-05-30

## 2024-05-30 LAB
SL AMB  POCT GLUCOSE, UA: ABNORMAL
SL AMB POCT URINE PROTEIN: ABNORMAL

## 2024-05-30 PROCEDURE — 99213 OFFICE O/P EST LOW 20 MIN: CPT | Performed by: CLINICAL NURSE SPECIALIST

## 2024-05-30 PROCEDURE — 81002 URINALYSIS NONAUTO W/O SCOPE: CPT | Performed by: CLINICAL NURSE SPECIALIST

## 2024-05-30 NOTE — ASSESSMENT & PLAN NOTE
@ 21w3d transfer from [facility]  Overall up to date on PNC.  Routine PNBW completed.  Up to date on pap and vaginal cultures  NT and NIPT nml/low risk  NO afp done yet- ordered and to be completed today or tomorrow.  Has level 2 US scheduled- but will need to be switched to our [facility]- referral given    Feeling well overall. Occasional nausea, managed with reglan prescription PRN.no wt loss.  Also currently in Zpak for ear infection  + FM  No c/o cramping or ctx.   + FHR today.  Reviewed [facility] [place] ctx and s/s PTL.   Precautions reviewed to call for - Vaginal bldg, LOF, abnormal d/c or > 4 ctx in an hour.   FM not consistent until around 28 wks.

## 2024-05-30 NOTE — ASSESSMENT & PLAN NOTE
Hx of PPD and has been maintained on zoloft since last pregnancy. Feels this is effective and denies SI/HI or worsening symptoms with pregnancy.

## 2024-05-30 NOTE — TELEPHONE ENCOUNTER
Medication:     metoclopramide (REGLAN) 5 mg tablet       Dose/Frequency: Take 1 tablet (5 mg total) by mouth every 6 (six) hours as needed (nausea/vomiting)     Quantity:  20 tablet     Pharmacy: [id number] - [place] - [id number]      Office:   [] PCP/Provider -   [] Speciality/Provider -     Does the patient have enough for 3 days?   [] Yes   [x] No - Send as HP to POD

## 2024-05-31 ENCOUNTER — ROUTINE PRENATAL (OUTPATIENT)
Dept: PERINATAL CARE | Facility: OTHER | Age: 29
End: 2024-05-31
Payer: COMMERCIAL

## 2024-05-31 ENCOUNTER — APPOINTMENT (OUTPATIENT)
Dept: LAB | Facility: CLINIC | Age: 29
End: 2024-05-31
Payer: COMMERCIAL

## 2024-05-31 VITALS
BODY MASS INDEX: 24.71 KG/M2 | HEIGHT: 68 IN | HEART RATE: 88 BPM | SYSTOLIC BLOOD PRESSURE: 100 MMHG | WEIGHT: 163 LBS | DIASTOLIC BLOOD PRESSURE: 58 MMHG

## 2024-05-31 DIAGNOSIS — Z13.6 ENCOUNTER FOR SCREENING FOR CARDIOVASCULAR DISORDERS: ICD-10-CM

## 2024-05-31 DIAGNOSIS — Z36.86 ENCOUNTER FOR ANTENATAL SCREENING FOR CERVICAL LENGTH: ICD-10-CM

## 2024-05-31 DIAGNOSIS — Z34.82 PRENATAL CARE, SUBSEQUENT PREGNANCY, SECOND TRIMESTER: ICD-10-CM

## 2024-05-31 DIAGNOSIS — Z36.3 ENCOUNTER FOR ANTENATAL SCREENING FOR MALFORMATION USING ULTRASOUND: Primary | ICD-10-CM

## 2024-05-31 DIAGNOSIS — Z3A.21 21 WEEKS GESTATION OF PREGNANCY: ICD-10-CM

## 2024-05-31 LAB
CHOLEST SERPL-MCNC: 206 MG/DL
HDLC SERPL-MCNC: 69 MG/DL
LDLC SERPL CALC-MCNC: 118 MG/DL (ref 0–100)
NONHDLC SERPL-MCNC: 137 MG/DL
TRIGL SERPL-MCNC: 95 MG/DL

## 2024-05-31 PROCEDURE — 76817 TRANSVAGINAL US OBSTETRIC: CPT | Performed by: OBSTETRICS & GYNECOLOGY

## 2024-05-31 PROCEDURE — 36415 COLL VENOUS BLD VENIPUNCTURE: CPT

## 2024-05-31 PROCEDURE — 80061 LIPID PANEL: CPT

## 2024-05-31 PROCEDURE — 76805 OB US >/= 14 WKS SNGL FETUS: CPT | Performed by: OBSTETRICS & GYNECOLOGY

## 2024-05-31 PROCEDURE — 99242 OFF/OP CONSLTJ NEW/EST SF 20: CPT | Performed by: OBSTETRICS & GYNECOLOGY

## 2024-05-31 RX ORDER — METOCLOPRAMIDE 5 MG/1
5 TABLET ORAL EVERY 6 HOURS PRN
Qty: 20 TABLET | Refills: 0 | Status: SHIPPED | OUTPATIENT
Start: 2024-05-31

## 2024-05-31 NOTE — TELEPHONE ENCOUNTER
Attempted to contact pt, unable to hear each other on the line. If patient alls back please inform her refill of Reglan was sent. Otherwise, will try to call later, thanks!

## 2024-05-31 NOTE — TELEPHONE ENCOUNTER
Pt called stating, she was not happy with [facility] & is coming back to [facility] & would like to continue her care with us.  Pt has appts with MFM today, & next month with OB office.    Completed 2nd trimester call & EPDS screening .

## 2024-05-31 NOTE — PROGRESS NOTES
"On exam today, the patient appears well, in no acute distress, and denies any complaints.  This is her fourth pregnancy.  She has a history of 2 previous full-term vaginal livery's without complications.  She had 1 first trimester termination.  She has a family history of diabetes and hypertension.    I reviewed the patient's genetic screening.  She had cell free DNA screening through [facility] with low risk results.    The fetal anatomic survey is complete. There is no sonographic evidence of fetal abnormalities at this time. Good fetal movement and tone are seen. The amniotic fluid volume appears normal. A transvaginal ultrasound was performed to assess the cervix, which was not seen well transabdominally. The cervical length was 3.19 centimeters, which is normal for the current gestational age. There was no significant funneling or dynamic changes appreciated.     The patient was informed of today's findings and all of her questions were answered to apparent satisfaction. The limitations of ultrasound were reviewed.    We discussed follow-up in detail and I recommend the patient return as clinically indicated.    Thank you very much for allowing us to participate in the care of this very nice patient. Should you have any questions, please do not hesitate to contact our office.    Please note I spent 5 minutes reviewing records prior to the visit, 5 minutes in patient contact including counseling and coordination of care, and 5 minutes in charting in the electronic medical record. Total time spent for the encounter is 15 minutes.    Portions of the record may have been created with voice recognition software. Occasional wrong word or \"sound a like\" substitutions may have occurred due to the inherent limitations of voice recognition software. Read the chart carefully and recognize, using context, where substitutions have occurred.  "

## 2024-05-31 NOTE — TELEPHONE ENCOUNTER
Overall how are you doing? good    Compliant with routine OB care appointments? Switching back to [facility] - has MFM & OB appts    Have you completed your 1st trimester labs? yes    If you had NIPS with MFM, do you have a order for MSAFP? yes   Can be completed 15w-22w9d, ideally 16w-18w    Have you seen [facility] and do you have your detailed US scheduled? Today at 1000 at [facility]    Pregnancy Education-have you had a chance to review the classes offered and registered? No but will look in to it.     EPDS Score 1

## 2024-05-31 NOTE — TELEPHONE ENCOUNTER
Second attempt at contacting patient. Was able to leave message this time. Office number provided if she has any further questions.   Walk in

## 2024-06-27 ENCOUNTER — ROUTINE PRENATAL (OUTPATIENT)
Dept: OBGYN CLINIC | Facility: MEDICAL CENTER | Age: 29
End: 2024-06-27
Payer: COMMERCIAL

## 2024-06-27 VITALS
SYSTOLIC BLOOD PRESSURE: 100 MMHG | HEIGHT: 68 IN | DIASTOLIC BLOOD PRESSURE: 64 MMHG | HEART RATE: 76 BPM | WEIGHT: 163 LBS | BODY MASS INDEX: 24.71 KG/M2

## 2024-06-27 DIAGNOSIS — O26.852 SPOTTING AFFECTING PREGNANCY IN SECOND TRIMESTER: Primary | ICD-10-CM

## 2024-06-27 DIAGNOSIS — Z34.82 PRENATAL CARE, SUBSEQUENT PREGNANCY, SECOND TRIMESTER: ICD-10-CM

## 2024-06-27 DIAGNOSIS — O99.340 DEPRESSION AFFECTING PREGNANCY: ICD-10-CM

## 2024-06-27 DIAGNOSIS — F32.A DEPRESSION AFFECTING PREGNANCY: ICD-10-CM

## 2024-06-27 DIAGNOSIS — Z3A.25 25 WEEKS GESTATION OF PREGNANCY: ICD-10-CM

## 2024-06-27 LAB
SL AMB  POCT GLUCOSE, UA: NORMAL
SL AMB POCT URINE PROTEIN: NORMAL

## 2024-06-27 PROCEDURE — 81002 URINALYSIS NONAUTO W/O SCOPE: CPT | Performed by: CLINICAL NURSE SPECIALIST

## 2024-06-27 PROCEDURE — 87210 SMEAR WET MOUNT SALINE/INK: CPT | Performed by: CLINICAL NURSE SPECIALIST

## 2024-06-27 PROCEDURE — 99214 OFFICE O/P EST MOD 30 MIN: CPT | Performed by: CLINICAL NURSE SPECIALIST

## 2024-06-27 NOTE — PROGRESS NOTES
Prenatal Visit  Subjective:   Susana is a 28 y.o.  25w3d here for Routine Prenatal Visit ([facility] 10/7/24/Blood type O+/Labs 28 week labs ordered/Urine -/-/Denies LOF, VB, or CTX./Pt has +FM/Questions or concerns-  )    Here for routine PNV.     She is c/o spotting overnight   Very light amt and pink in color  Last sex 2 wks ago   Denies LOF, does have some intermittent minor cramping- more on left side  Reports she is feeling some FM, but not very consistent yet- has anterior placenta   Denies change in vaginal d/c otherwise  Denies watery leakage of fluid    Objective:  Vitals:    24 1538   BP: 100/64   Pulse: 76     Pregravid Weight/BMI: 68 kg (150 lb) (BMI 22.81)  Current Weight: 73.9 kg (163 lb)   Total Weight Gain: 5.897 kg (13 lb)     Fetal Heart Rate: 140  Fundal Height (cm): 26 cm    Physical Exam  Constitutional:       General: She is not in acute distress.     Appearance: Normal appearance.   Genitourinary:      Urethral meatus normal.      No lesions in the vagina.      Right Labia: No rash, lesions or skin changes.     Left Labia: No lesions, skin changes or rash.     Vaginal discharge present.      No vaginal erythema, tenderness, bleeding (no blood noted at all in vagina) or ulceration.        Right Adnexa: not tender, not full and no mass present.     Left Adnexa: not tender, not full and no mass present.     No cervical motion tenderness, discharge, friability or lesion.      Cervical exam comments: Cervix closed thick and high.      Uterus is not enlarged or tender.      Bladder is not tender.       Pelvic exam was performed with patient in the lithotomy position.   Rectum:      No external hemorrhoid.   HENT:      Head: Normocephalic.   Cardiovascular:      Rate and Rhythm: Normal rate.   Pulmonary:      Effort: Pulmonary effort is normal.   Neurological:      Mental Status: She is alert and oriented to person, place, and time.   Psychiatric:         Mood and Affect: Mood normal.          Behavior: Behavior normal.   Vitals reviewed.       Assessment & Plan:        1. Spotting affecting pregnancy in second trimester  Assessment & Plan:  Pt c/o spotting overnight, pink and just with wiping. -see HPI  Exam and wet mount done  and nml. Cervix closed/thick. No blood visualized  Advised pelvic rest x 2 wks. Call if bleeding recurrent will then order formal US.  PTL precautions reviewed.   Orders:  -     POCT wet mount  2. Prenatal care, subsequent pregnancy, second trimester  -     Anemia Panel w/Reflex, OB; Future  -     CBC and differential; Future  -     RPR-Syphilis Screening (Total Syphilis IGG/IGM); Future  -     POCT urine dip  3. 25 weeks gestation of pregnancy  Overview:  Transfer from [facility] at 21w3d  PNBW-x, NIPT- low risk; AFP and level 2 US ordered   Assessment & Plan:  25w3d  Doing well, reports good FM  Reviewed the following today:  S/S PTL including to call if having >  4-6 ctx in an hour.   S/S HTN disorders in pregnancy such as pre-eclampsia  Orders for routine 3rd trimester bloodwork given today -to be done around 26-28 wks- see ordered  Tdap vaccine- to be given to all pregnant women in the 3rd trimester (27-36 weeks) of each pregnancy in order to protect against pertussis.  It is also recommended that anyone who is going to have close contact with the baby also get the vaccine at their health care provider.        Orders:  -     Ambulatory Referral to Maternal Fetal Medicine; Future; Expected date: 06/28/2024  4. Depression affecting pregnancy  Assessment & Plan:  Feels well on current zoloft dose. Denies SI/HI       JONAS Hebert  6/28/2024

## 2024-06-27 NOTE — ASSESSMENT & PLAN NOTE
Pt c/o spotting overnight, pink and just with wiping. -see HPI  Exam and wet mount done  and nml. Cervix closed/thick. No blood visualized  Advised pelvic rest x 2 wks. Call if bleeding recurrent will then order formal US.  PTL precautions reviewed.

## 2024-06-28 ENCOUNTER — TELEPHONE (OUTPATIENT)
Age: 29
End: 2024-06-28

## 2024-06-28 LAB
BV WHIFF TEST VAG QL: NORMAL
CLUE CELLS SPEC QL WET PREP: NORMAL
PH SMN: 4 [PH]
SL AMB POCT WET MOUNT: NORMAL
T VAGINALIS VAG QL WET PREP: NORMAL
YEAST VAG QL WET PREP: NORMAL

## 2024-06-28 NOTE — TELEPHONE ENCOUNTER
Called and spoke with patient, I needed some clarification on initial message. Pt states she had an appt with Alaina yesterday and told Alaina that she did not want Glucola drink ordered, she wanted to test sugars at home instead. Advised pt will need to have supplies (lancets, strips and device) ordered. Pt said Alaina placed a referral to MFM and was calling to see what her next steps were. Number for MFM was given to her and informed her to call to have this set up as MFM/DP follows glucose readings closer than we do and can better assist her. No further questions.

## 2024-06-28 NOTE — TELEPHONE ENCOUNTER
Pt called back, stated that she will not drink the glucola for the 1 hr glucose test and is asking if there is a test that she can do at home.

## 2024-06-28 NOTE — TELEPHONE ENCOUNTER
Pt called stating she is to sched an appt for a glucose test. Pt states it is not for the glucola. Pt needs some clarification of test she needs to sched with please call pt back to further discuss.

## 2024-07-10 ENCOUNTER — PATIENT MESSAGE (OUTPATIENT)
Dept: PERINATAL CARE | Facility: CLINIC | Age: 29
End: 2024-07-10

## 2024-07-10 ENCOUNTER — DOCUMENTATION (OUTPATIENT)
Dept: PERINATAL CARE | Facility: CLINIC | Age: 29
End: 2024-07-10

## 2024-07-10 ENCOUNTER — TELEMEDICINE (OUTPATIENT)
Dept: PERINATAL CARE | Facility: CLINIC | Age: 29
End: 2024-07-10
Payer: COMMERCIAL

## 2024-07-10 DIAGNOSIS — Z3A.27 27 WEEKS GESTATION OF PREGNANCY: ICD-10-CM

## 2024-07-10 DIAGNOSIS — Z13.1 DIABETES MELLITUS SCREENING: Primary | ICD-10-CM

## 2024-07-10 PROCEDURE — G0108 DIAB MANAGE TRN  PER INDIV: HCPCS | Performed by: DIETITIAN, REGISTERED

## 2024-07-10 RX ORDER — BLOOD-GLUCOSE METER
EACH MISCELLANEOUS
Qty: 1 KIT | Refills: 0 | Status: SHIPPED | OUTPATIENT
Start: 2024-07-10 | End: 2024-10-07

## 2024-07-10 RX ORDER — LANCETS 33 GAUGE
EACH MISCELLANEOUS
Qty: 100 EACH | Refills: 0 | Status: SHIPPED | OUTPATIENT
Start: 2024-07-10 | End: 2024-10-07

## 2024-07-10 RX ORDER — BLOOD SUGAR DIAGNOSTIC
STRIP MISCELLANEOUS
Qty: 100 STRIP | Refills: 0 | Status: SHIPPED | OUTPATIENT
Start: 2024-07-10 | End: 2024-10-07

## 2024-07-10 NOTE — PROGRESS NOTES
Virtual Regular Visit  Name: Susana Carl      : 1995      MRN: [id number]  Encounter Provider: Gabriela Sarkar  Encounter Date: 7/10/2024   Encounter [facility]    Verification of patient location: [place]    Patient is located at Home in the following state in which I hold an active license [place]    Assessment & Plan   1. Diabetes mellitus screening  2. 27 weeks gestation of pregnancy  -     Ambulatory Referral to Maternal Fetal Medicine        Encounter provider Gabriela Sarkar    The patient was identified by name and date of birth. Susana Carl was informed that this is a telemedicine visit and that the visit is being conducted through the Epic Embedded platform. She agrees to proceed..  My office door was closed. No one else was in the room.  She acknowledged consent and understanding of privacy and security of the video platform. The patient has agreed to participate and understands they can discontinue the visit at any time.    Patient is aware this is a billable service.     History of Present Illness     Susana Carl is a 29 y.o. female who presents pregnant patient    Review of Systems    Objective     LMP 2024 (Exact Date)   Physical Exam--not performed    Visit Time  Total Visit Duration: 30 minutes      Thank you for referring your patient to [facility] Maternal Fetal Medicine Diabetes in Pregnancy Program.     Susana Carl is a  29 y.o. female who presents today for blood glucose monitoring during pregnancy.  Patient is at 27w2d gestation, Estimated Date of Delivery: 10/7/24.     Reviewed and updated the following from patients medical record: PMH, Problem List, Allergies, and Current Medications.    Visit Diagnosis:  GDM Screening  Reason for meter education: Declined GTT testing    Additional Pregnancy Complications:  None    Labs:    Lab Results   Component Value Date    [id number] 108 2021       No components  "found for: \"HGA1C\"    Medications:  No diabetes related medications    Anthropometrics:  Ht Readings from Last 3 Encounters:   24 5' 8\" (1.727 m)   24 5' 8\" (1.727 m)   24 5' 8\" (1.727 m)     Wt Readings from Last 3 Encounters:   24 73.9 kg (163 lb)   24 73.9 kg (163 lb)   24 73.5 kg (162 lb)     Pre-gravid weight: 68 kg (150 lb)  Pre-gravid BMI: 22.81  Weight Change: 5.897 kg (13 lb)  Weight gain recommendations: BMI (18.5-24.9) 25-35 lbs  Comments: may gain 22 more pounds for the remainder of the pregnancy    Recent Ultra Sound Results:  Date: 24  Fetal Growth: Normal  TU: Normal  Next US: none    Blood Glucose Monitoring:   Glucose Meter: OneTouch Verio Flex  Instructed on testing blood sugars: 4 x per day (Fasting, 2 hour after start of each meal)    Gave instruction on site selection, skin preparation, loading strips and lancet device, meter activation, obtaining blood sample, test strip and lancet disposal and storage, and recording log book entries. Patient has good understanding of material covered and was able to test their own blood sugar in office today.     Instruction for reporting blood sugar results in 1 week to her OB physician.:    Goal Blood Sugar Ranges:   Fastin-90 mg/dL  1 hour after the start of each meal: 140 mg/dL or less  2 hours after start of each meal: 120 mg/dL or less    Date to report blood sugars: to her OB physician to determine if she has a diagnosis of gestational diabetes after testing for 1 week    Begin Time: 2:55 PM  End Time: 3:20 PM    It was a pleasure working with them today. Please feel free to call with any questions or concerns.    Gabriela Sarkar, MS, RD, [facility]  Diabetes Educator  [facility] Maternal Fetal Medicine  Diabetes in Pregnancy Program  [address], [address]  [place] [address]     "

## 2024-07-10 NOTE — PROGRESS NOTES
Demographics:  Language: English  Ethnicity: Black/   Country of Origin: [place]  Highest grade completed: College Degree  Occupation: Homemaker    Personal & Family History:  Personal history of diabetes? no  Family members with diabetes: Mother--had gestational daibetes  How many total pregnancies have you had? 4  How many children do you have? 2  Have you had a baby weighing larger than 9 lbs? no  Are you having swelling or fluid retention? no  Have you been placed on bedrest? no    Nutrition & Physical Activity:  Do you exercise? no  How many meals do you eat daily? 3  List times of meals: Breakfast: 8 PM/ Lunch: 3 PM/ Dinner: 5 PM  How many snacks do you eat daily? 1  List times of snacks: Other 11 AM & sometimes has a  bedtime snack  What type of diet are you following at home? Regular  Do you have special Denominational or ethnic dietary preferences? no  Do you have any food allergies or intolerances? no  Do you receive WIC or food stamps? yes both    Learning preferences:  How do you learn best?  Demonstration  How do you rate your health? Good  Who is your primary support person? Family Member mom  How do you cope with stress? Walks & reads

## 2024-07-18 ENCOUNTER — ROUTINE PRENATAL (OUTPATIENT)
Dept: OBGYN CLINIC | Facility: MEDICAL CENTER | Age: 29
End: 2024-07-18
Payer: COMMERCIAL

## 2024-07-18 ENCOUNTER — LAB (OUTPATIENT)
Dept: LAB | Facility: MEDICAL CENTER | Age: 29
End: 2024-07-18
Payer: COMMERCIAL

## 2024-07-18 VITALS
HEIGHT: 68 IN | WEIGHT: 167 LBS | BODY MASS INDEX: 25.31 KG/M2 | SYSTOLIC BLOOD PRESSURE: 118 MMHG | DIASTOLIC BLOOD PRESSURE: 70 MMHG | HEART RATE: 69 BPM

## 2024-07-18 DIAGNOSIS — Z3A.28 28 WEEKS GESTATION OF PREGNANCY: ICD-10-CM

## 2024-07-18 DIAGNOSIS — Z36.9 ENCOUNTER FOR ANTENATAL SCREENING: ICD-10-CM

## 2024-07-18 DIAGNOSIS — O26.852 SPOTTING AFFECTING PREGNANCY IN SECOND TRIMESTER: ICD-10-CM

## 2024-07-18 DIAGNOSIS — Z23 ENCOUNTER FOR IMMUNIZATION: Primary | ICD-10-CM

## 2024-07-18 DIAGNOSIS — O99.340 DEPRESSION AFFECTING PREGNANCY: ICD-10-CM

## 2024-07-18 DIAGNOSIS — Z34.82 PRENATAL CARE, SUBSEQUENT PREGNANCY, SECOND TRIMESTER: ICD-10-CM

## 2024-07-18 DIAGNOSIS — Z34.83 PRENATAL CARE, SUBSEQUENT PREGNANCY, THIRD TRIMESTER: ICD-10-CM

## 2024-07-18 DIAGNOSIS — F32.A DEPRESSION AFFECTING PREGNANCY: ICD-10-CM

## 2024-07-18 LAB
BASOPHILS # BLD AUTO: 0.04 THOUSANDS/ÂΜL (ref 0–0.1)
BASOPHILS NFR BLD AUTO: 1 % (ref 0–1)
EOSINOPHIL # BLD AUTO: 0.19 THOUSAND/ÂΜL (ref 0–0.61)
EOSINOPHIL NFR BLD AUTO: 2 % (ref 0–6)
ERYTHROCYTE [DISTWIDTH] IN BLOOD BY AUTOMATED COUNT: 15 % (ref 11.6–15.1)
HCT VFR BLD AUTO: 38 % (ref 34.8–46.1)
HGB BLD-MCNC: 12.5 G/DL (ref 11.5–15.4)
IMM GRANULOCYTES # BLD AUTO: 0.09 THOUSAND/UL (ref 0–0.2)
IMM GRANULOCYTES NFR BLD AUTO: 1 % (ref 0–2)
LYMPHOCYTES # BLD AUTO: 2.06 THOUSANDS/ÂΜL (ref 0.6–4.47)
LYMPHOCYTES NFR BLD AUTO: 26 % (ref 14–44)
MCH RBC QN AUTO: 30.4 PG (ref 26.8–34.3)
MCHC RBC AUTO-ENTMCNC: 32.9 G/DL (ref 31.4–37.4)
MCV RBC AUTO: 93 FL (ref 82–98)
MONOCYTES # BLD AUTO: 0.69 THOUSAND/ÂΜL (ref 0.17–1.22)
MONOCYTES NFR BLD AUTO: 9 % (ref 4–12)
NEUTROPHILS # BLD AUTO: 4.98 THOUSANDS/ÂΜL (ref 1.85–7.62)
NEUTS SEG NFR BLD AUTO: 61 % (ref 43–75)
NRBC BLD AUTO-RTO: 0 /100 WBCS
PLATELET # BLD AUTO: 185 THOUSANDS/UL (ref 149–390)
PMV BLD AUTO: 11.4 FL (ref 8.9–12.7)
RBC # BLD AUTO: 4.11 MILLION/UL (ref 3.81–5.12)
SL AMB  POCT GLUCOSE, UA: NORMAL
SL AMB POCT URINE PROTEIN: NORMAL
TREPONEMA PALLIDUM IGG+IGM AB [PRESENCE] IN SERUM OR PLASMA BY IMMUNOASSAY: NORMAL
WBC # BLD AUTO: 8.05 THOUSAND/UL (ref 4.31–10.16)

## 2024-07-18 PROCEDURE — 85025 COMPLETE CBC W/AUTO DIFF WBC: CPT

## 2024-07-18 PROCEDURE — 86780 TREPONEMA PALLIDUM: CPT

## 2024-07-18 PROCEDURE — 81002 URINALYSIS NONAUTO W/O SCOPE: CPT | Performed by: CLINICAL NURSE SPECIALIST

## 2024-07-18 PROCEDURE — 36415 COLL VENOUS BLD VENIPUNCTURE: CPT

## 2024-07-18 PROCEDURE — 99214 OFFICE O/P EST MOD 30 MIN: CPT | Performed by: CLINICAL NURSE SPECIALIST

## 2024-07-18 PROCEDURE — 82105 ALPHA-FETOPROTEIN SERUM: CPT

## 2024-07-18 NOTE — PROGRESS NOTES
Prenatal Visit  Subjective:   Susana is a 29 y.o.  28w3d here for Routine Prenatal Visit (LICO 10/7/24/w3d/Blood type O+/Labs active /Yellow folder given and reviewed /Del consent signed/Breast pump  ordered /Peds not needed /Tdap /Urine /GBS //Denies LOF, VB, or CTX./Pt has +FM/Questions or conerns-  )    Patient denies contractions or vaginal bleeding. Also denies LOF or unusual vaginal discharge.  She describes the fetus as active.      Objective:  Vitals:    24 0857   BP: 118/70   Pulse: 69     Pregravid Weight/BMI: 68 kg (150 lb) (BMI 22.81)  Current Weight: 75.8 kg (167 lb)   Total Weight Gain: 7.711 kg (17 lb)     OBGyn Exam    Fetal Heart Rate: 145 , Fundal Height (cm): 28 cm    Assessment & Plan:      1. Encounter for immunization  2. Prenatal care, subsequent pregnancy, third trimester  -     POCT urine dip  3. 28 weeks gestation of pregnancy  Overview:  [facility] from [facility] at 21w3d  PNBW-x, NIPT- low risk; AFP and level 2 US ordered   Interested in eIOL  Assessment & Plan:    28w3d  Doing well,  reports good FM  Recent labs were not yet completed.  Also doing alternate GDM screning - in process.  3 of 5 days with FBS >90 but not > 95. No PP elevations    The patient was counseled about the labor process. She was counseled regarding potential reasons that she may need a  section including arrest of dilation/descent, non-reassuring fetal status, or worsening maternal status.      She was counseled on the risks of  including bleeding, infection, and injury to surrounding structures including the bowel and bladder. She was counseled that there are medical and surgical methods to manage excessive postpartum bleeding. She was counseled that in the event of excessive blood loss, she may require blood transfusion which includes a small risk of blood borne diseases such as hepatitis and HIV. The patient is OK with receiving a blood transfusion if necessary.  The patient had an  opportunity to ask questions and signed consent.      She was counseled about the possible need for operative delivery using the vacuum or forceps and the indications for doing so. She was counseled that there is a small risk of  complications including intracranial hemorrhage, lacerations, nerve damage or fracture as well as the increased risk for more severe maternal laceration. The patient signed consent.   Warning signs in 3rd trimester of pregnancy reviewed as well as PTL and pre-e precautions.  Fetal activity/fetal kick counts reviewed and to call with decreased FM  3rd trimester education folder given today along with form for breast pump if Breastfeeding desires.   Reviewed availability of Childbirth education classes offered by [facility] and encouraged  4. Spotting affecting pregnancy in second trimester  Assessment & Plan:  NO further bldg/spotting  5. Depression affecting pregnancy  Overview:  JONAS Dockery  2024

## 2024-07-19 ENCOUNTER — OFFICE VISIT (OUTPATIENT)
Dept: URGENT CARE | Facility: CLINIC | Age: 29
End: 2024-07-19
Payer: COMMERCIAL

## 2024-07-19 VITALS
TEMPERATURE: 97.3 F | RESPIRATION RATE: 20 BRPM | OXYGEN SATURATION: 100 % | HEART RATE: 71 BPM | DIASTOLIC BLOOD PRESSURE: 71 MMHG | SYSTOLIC BLOOD PRESSURE: 119 MMHG

## 2024-07-19 DIAGNOSIS — O21.9 NAUSEA AND VOMITING DURING PREGNANCY: ICD-10-CM

## 2024-07-19 DIAGNOSIS — J02.9 SORE THROAT: ICD-10-CM

## 2024-07-19 DIAGNOSIS — J02.9 ACUTE PHARYNGITIS, UNSPECIFIED ETIOLOGY: Primary | Chronic | ICD-10-CM

## 2024-07-19 PROBLEM — Z3A.24 24 WEEKS GESTATION OF PREGNANCY: Status: ACTIVE | Noted: 2024-04-08

## 2024-07-19 LAB
DME PARACHUTE DELIVERY DATE REQUESTED: NORMAL
DME PARACHUTE ITEM DESCRIPTION: NORMAL
DME PARACHUTE ORDER STATUS: NORMAL
DME PARACHUTE SUPPLIER NAME: NORMAL
DME PARACHUTE SUPPLIER PHONE: NORMAL
S PYO AG THROAT QL: NEGATIVE

## 2024-07-19 PROCEDURE — 99213 OFFICE O/P EST LOW 20 MIN: CPT

## 2024-07-19 PROCEDURE — 87880 STREP A ASSAY W/OPTIC: CPT

## 2024-07-19 PROCEDURE — 87070 CULTURE OTHR SPECIMN AEROBIC: CPT

## 2024-07-19 RX ORDER — METOCLOPRAMIDE 5 MG/1
5 TABLET ORAL EVERY 6 HOURS PRN
Qty: 20 TABLET | Refills: 0 | Status: SHIPPED | OUTPATIENT
Start: 2024-07-19

## 2024-07-19 NOTE — TELEPHONE ENCOUNTER
Reason for call:   [x] Refill   [] Prior Auth  [] Other:     Office:   [] PCP/Provider -   [x] Specialty/Provider - OBGYN    Medication: metoclopramide (REGLAN) 5 mg tablet      Dose/Frequency: Take 1 tablet (5 mg total) by mouth every 6 (six) hours as needed (nausea/vomiting)     Quantity: 20    Pharmacy: [id number] - [place]  [address]      Does the patient have enough for 3 days?   [] Yes   [x] No - Send as HP to POD
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
## 2024-07-19 NOTE — PATIENT INSTRUCTIONS
Continue throat lozenges, cool liquids, and salt water gargles as needed. May continue tylenol every 4-6 hours as needed for pain and fever. May use oral (zyrtec, sudafed, benadryl, claritin) or nasal (flonase) decongestants as needed for congestion.Continue reglan as previously prescribed for nausea. Follow-up with PCP in 3-5 days if no improvement of symptoms. Report to ER if symptoms worsen (unable to keep food/fluids down for >24 hours).

## 2024-07-21 LAB — BACTERIA THROAT CULT: NORMAL

## 2024-07-22 LAB
2ND TRIMESTER 4 SCREEN SERPL-IMP: NORMAL
AFP ADJ MOM SERPL: NORMAL
AFP INTERP AMN-IMP: NORMAL
AFP INTERP SERPL-IMP: NORMAL
AFP INTERP SERPL-IMP: NORMAL
AFP SERPL-MCNC: 225.5 NG/ML
AGE AT DELIVERY: 27.2 YR
GA METHOD: NORMAL
GA: 28.4 WEEKS
IDDM PATIENT QL: NO
MULTIPLE PREGNANCY: NO
NEURAL TUBE DEFECT RISK FETUS: NORMAL %

## 2024-08-05 ENCOUNTER — ROUTINE PRENATAL (OUTPATIENT)
Dept: OBGYN CLINIC | Facility: CLINIC | Age: 29
End: 2024-08-05
Payer: COMMERCIAL

## 2024-08-05 VITALS — WEIGHT: 168 LBS | DIASTOLIC BLOOD PRESSURE: 80 MMHG | BODY MASS INDEX: 25.54 KG/M2 | SYSTOLIC BLOOD PRESSURE: 122 MMHG

## 2024-08-05 DIAGNOSIS — Z23 NEED FOR TDAP VACCINATION: ICD-10-CM

## 2024-08-05 DIAGNOSIS — F32.A DEPRESSION AFFECTING PREGNANCY: ICD-10-CM

## 2024-08-05 DIAGNOSIS — Z3A.31 31 WEEKS GESTATION OF PREGNANCY: ICD-10-CM

## 2024-08-05 DIAGNOSIS — O99.340 DEPRESSION AFFECTING PREGNANCY: ICD-10-CM

## 2024-08-05 DIAGNOSIS — Z34.83 PRENATAL CARE, SUBSEQUENT PREGNANCY, THIRD TRIMESTER: Primary | ICD-10-CM

## 2024-08-05 PROBLEM — Z3A.24 24 WEEKS GESTATION OF PREGNANCY: Status: RESOLVED | Noted: 2024-04-08 | Resolved: 2024-08-05

## 2024-08-05 PROBLEM — N94.6 DYSMENORRHEA: Status: RESOLVED | Noted: 2023-02-04 | Resolved: 2024-08-05

## 2024-08-05 LAB
SL AMB  POCT GLUCOSE, UA: ABNORMAL
SL AMB POCT URINE PROTEIN: ABNORMAL

## 2024-08-05 PROCEDURE — 81002 URINALYSIS NONAUTO W/O SCOPE: CPT | Performed by: NURSE PRACTITIONER

## 2024-08-05 PROCEDURE — G9920 SCRNING PERF AND NEGATIVE: HCPCS | Performed by: NURSE PRACTITIONER

## 2024-08-05 PROCEDURE — 90715 TDAP VACCINE 7 YRS/> IM: CPT | Performed by: NURSE PRACTITIONER

## 2024-08-05 PROCEDURE — 90471 IMMUNIZATION ADMIN: CPT | Performed by: NURSE PRACTITIONER

## 2024-08-05 PROCEDURE — 99213 OFFICE O/P EST LOW 20 MIN: CPT | Performed by: NURSE PRACTITIONER

## 2024-08-05 RX ORDER — ONDANSETRON 8 MG/1
TABLET, ORALLY DISINTEGRATING ORAL
COMMUNITY
Start: 2024-07-21

## 2024-08-05 RX ORDER — PYRIDOXINE HCL (VITAMIN B6) 25 MG
25 TABLET ORAL EVERY 8 HOURS PRN
COMMUNITY
Start: 2024-07-20

## 2024-08-05 NOTE — ASSESSMENT & PLAN NOTE
She feels well. She denies LOF/CTX/VB. She did not have any concerns today. We discussed fetal kick counting. Yellow folder and tdap given today.  Urine was very concentrated this afternoon, advised to increase fluids to help prevent dehydration symptoms. Overview charting updated today.

## 2024-08-05 NOTE — PATIENT INSTRUCTIONS
Patient Education     Pregnancy - The Seventh Month   About this topic   It is important for you to learn how to take care of yourself to help you have a healthy baby and safe delivery. It is good to have health care throughout your pregnancy.  The seventh month of your pregnancy starts around week 29 and lasts through week 32. By knowing how far along you are, you can learn what is normal for this stage of your pregnancy and plan for what is next.  General   Your body   During the seventh month of your pregnancy, here are some things you can expect.  You may:  Have weight gain of about 15 to 20 pounds (6.8 to 9 kg) total in your first 7 months.  Have more trouble moving about and sleeping as the baby gets bigger  Have very vivid dreams  Notice more vaginal discharge  Notice a creamy, watery substance leaking from your nipples  Need a shot called Rh immune globulin if your blood type may be different from your baby's  Your baby's growth and development:  Your baby is gaining weight and adding layers of fat.  Your baby turns to be head down, into the position for delivery.  They are able to respond to loud noises and to dream.  Your baby moves at least 10 times in 2 hours. If your baby isn't moving this much, talk to your doctor right away.  Your baby is about 16 inches (42 cm) long and weighs about 4 pounds (1,800 gm). Your baby is about the size of squash.  Things to Think About   Avoid alcohol, drugs, tobacco products, and second hand smoke  Think about what you want your baby's birth to be like. Who do you want with you?  Find out about what lactation services are covered by your insurance.  Look into lactation consultants and breastfeeding classes.  Where do you want to deliver? It’s time to make a plan for labor and delivery.  Plan on getting a car seat and other things for your baby.  Talk to your doctor if you plan to travel or get on a plane.  When do I need to call the doctor?   Contractions every 10  minutes or more often that do not go away with drinking water or position changes.  Low, dull back pain that does not go away  Feeling unusually dizzy or tired  Pressure in your pelvis that feels like your baby is pushing down  A gush or constant trickle of watery or bloody fluid leaking from your vagina  Cramps in your lower belly that come and go or are constant  Little to no movement felt by baby in 2 hours. Your baby should be moving at least 10 times every 2 hours.  Headache that does not go away; blurry vision; seeing spots or halos; increase in swelling in your hands, feet, or face; and pain under your ribs on the right side  Vaginal bleeding with or without pain  Fever of 100.4°F (38°C) or higher  After a car accident, fall, or any trauma to your belly  Having thoughts of harming yourself or others, or do not feel safe at home  Last Reviewed Date   2020-05-06  Consumer Information Use and Disclaimer   This generalized information is a limited summary of diagnosis, treatment, and/or medication information. It is not meant to be comprehensive and should be used as a tool to help the user understand and/or assess potential diagnostic and treatment options. It does NOT include all information about conditions, treatments, medications, side effects, or risks that may apply to a specific patient. It is not intended to be medical advice or a substitute for the medical advice, diagnosis, or treatment of a health care provider based on the health care provider's examination and assessment of a patient’s specific and unique circumstances. Patients must speak with a health care provider for complete information about their health, medical questions, and treatment options, including any risks or benefits regarding use of medications. This information does not endorse any treatments or medications as safe, effective, or approved for treating a specific patient. [employer] and its affiliates disclaim any warranty or  liability relating to this information or the use thereof. The use of this information is governed by the Terms of Use, available at https://www.wolterskluwer.com/en/know/clinical-effectiveness-terms   Copyright   Copyright © 2024 [employer] and its affiliates and/or licensors. All rights reserved.

## 2024-08-05 NOTE — PROGRESS NOTES
Problem   31 Weeks Gestation of Pregnancy    Transfer from [facility] at 21w3d    PNBW-x, NIPT- low risk; AFP missed window  Pap/GC/CT all negative  Level 2 US prn follow up  Interested in [facility]    Tdap: 8/5  28 wk labs: 1* on 7/8  Delivery consent:signed  Vag/Perineal massages:  IOL:  GBS:  L2: PRN follow up recommended       24 Weeks Gestation of Pregnancy (Resolved)    Last Assessment & Plan:     Susana presents today for her 14 week [facility] visit.  She is currently 14w0d.  She has obtained her prenatal labs and the results have been reviewed with her.         The patient has chosen to undergo average risk cell free DNA with NT and the available results have been reviewed with her as well as the appropriate future follow-up arranged if necessary.    MsAFP: pick list ordered        Available results were reviewed with her and appropriate follow-up has been arranged.        Level II u/s has been ordered and has been scheduled.    The patient's MFM consult has been scheduled and/or completed: Yes          2/20/2024      10:02 AM     Maternal Mental Health Screening     Did any of your parents have a problem with alcohol or other drug use including prescription medications? No     Does your partner or someone close to you have a problem with alcohol or other drug use including prescription medications?  No     In the past, have you had difficulties in your life due to alcohol or other drugs including prescription medications?  No     In the past month, have you had any alcohol or used other drugs, including prescription medications? Yes              No data to display                     I have reviewed and addressed the patient's mental health screening results and have placed the following referrals as needed        We reviewed the common symptoms of this stage of pregnancy as well as warning signs/symptoms, and when to contact the office.        I have reviewed and updated the patient's problem list.        After  review of the patient's comorbidities and risk factors, she is a candidate for the following pregnancy care options: maternity care pathway    As of now, she chooses: maternity care pathway        Indications for [facility] Referral: Screened Negative        Patient referred to [facility]: No        If the patient is planning on virtual care, she is being scheduled for her 24-wk F2F clinician visit, as well as an RN virtual care enrollment visit.        All questions were answered to her satisfaction.     Dysmenorrhea (Resolved)

## 2024-08-06 LAB
DME PARACHUTE DELIVERY DATE REQUESTED: NORMAL
DME PARACHUTE ITEM DESCRIPTION: NORMAL
DME PARACHUTE ORDER STATUS: NORMAL
DME PARACHUTE SUPPLIER NAME: NORMAL
DME PARACHUTE SUPPLIER PHONE: NORMAL

## 2024-08-15 PROBLEM — Z3A.33 33 WEEKS GESTATION OF PREGNANCY: Status: ACTIVE | Noted: 2024-04-08

## 2024-08-15 NOTE — ASSESSMENT & PLAN NOTE
Susana Carl is a 29 y.o.   33w1d who presents for routine PNV.  28 week labs reviewed: NML, did finger sticks for 1 week, normal values   TWG 11.3 kg (25 lb)   Denies OB complaints. Good fetal movement. Denies contractions, cramping, leakage of fluid or vaginal bleeding.   Tdap vaccine completed  Reviewed  labor precautions and FKCs.   Advised to start Perineal massage at 34-36 weeks   Pregnancy Essential guide and Baby and Me web site recommended

## 2024-08-15 NOTE — PATIENT INSTRUCTIONS
Patient Education     Pregnancy - The Eighth Month   About this topic   It is important for you to learn how to take care of yourself to help you have a healthy baby and safe delivery. It is good to have health care throughout your pregnancy.  The eighth month of your pregnancy starts around week 33 and lasts through week 36. By knowing how far along you are, you can learn what is normal for this stage of your pregnancy and plan for what is next.  General   Your body   During the eighth month of your pregnancy, here are some things you can expect.  You may:  Be feeling more tired. Rest as much as you can and take small naps if possible. This also helps with swollen feet and ankles.  Feel hot all the time. Be sure to drink plenty of water.  Notice your baby drops more into your pelvis. This makes breathing a bit easier, but you may have to go to the bathroom more often. You may also notice more problems with hemorrhoids.  Have more back pain  Notice clear jelly-like substance flecked with blood when you use the restroom. This is your mucus plug.  Feel less steady on your feet. This is because your hips and joints are preparing for birth.  Be tested for Group Beta Strep (GBS) to see if you will need antibiotics during labor  Gain about 1/2 to 1 pound (.23 to .45 kg) a week for the rest of your pregnancy. It is normal to gain about 5 to 10 pounds (2.3 to 4.5 kg) total in your first 4 months.  Have a BPP, or Biophysical Profile. The doctors do an ultrasound to check your baby’s health if you are at high risk or having problems.  Have a NST, or Non Stress Test. The staff place special monitors around your belly to check the baby’s heart rate and look for contractions.  Your baby's growth and development:  Your baby is almost fully mature but will spend the rest of the time inside of you getting bigger.  Their lungs are the last organ to mature, so it is important that your baby stays in your womb until your due date if  possible.  Their bones are getting stronger each day. The bones in their skull stay a little softer to make delivery easier.  They are able to scratch themselves as their fingernails are developed.  Your baby is becoming protected from germs as they develop antibodies.  They are moving a little less because there is less room.  Your baby is about 19 inches (48 cm) long and weighs about 6 pounds (2,700 gm). Your baby is about the size of a papaya or pineapple.  Things to Think About   Avoid alcohol, drugs, tobacco products, and second hand smoke.  Be sure you know the signs of labor and when to call your doctor.  Are you planning a natural childbirth or thinking about an epidural? Know things you can do to help cope with labor pain.  You may want to learn about cord blood banking.  Do you have everything you need for after the baby is born? Be sure the car seat fits in the car.  When do I need to call the doctor?   Contractions every 10 minutes or more often that do not go away with drinking water or position changes  Headache that does not go away; blurry vision; seeing spots or halos; increase in swelling in your hands, feet, or face; and pain under your ribs on the right side  Low, dull back pain that does not go away  Pressure in your pelvis that feels like your baby is pushing down  A gush or constant trickle of watery or bloody fluid leaking from your vagina  Little to no movement felt by baby in 2 hours. Your baby should be moving at least 10 times every 2 hours.  Vaginal bleeding with or without pain  Fever of 100.4°F (38°C) or higher  After a car accident, fall, or any trauma to your belly  Having thoughts of harming yourself or others, or do not feel safe at home  Last Reviewed Date   2020-05-06  Consumer Information Use and Disclaimer   This generalized information is a limited summary of diagnosis, treatment, and/or medication information. It is not meant to be comprehensive and should be used as a tool  "to help the user understand and/or assess potential diagnostic and treatment options. It does NOT include all information about conditions, treatments, medications, side effects, or risks that may apply to a specific patient. It is not intended to be medical advice or a substitute for the medical advice, diagnosis, or treatment of a health care provider based on the health care provider's examination and assessment of a patient’s specific and unique circumstances. Patients must speak with a health care provider for complete information about their health, medical questions, and treatment options, including any risks or benefits regarding use of medications. This information does not endorse any treatments or medications as safe, effective, or approved for treating a specific patient. UpToDate, Inc. and its affiliates disclaim any warranty or liability relating to this information or the use thereof. The use of this information is governed by the Terms of Use, available at https://www.R[employer].StepOut/en/know/clinical-effectiveness-terms   Copyright   Copyright © 2024 UpToDate, Inc. and its affiliates and/or licensors. All rights reserved.  Patient Education     Your baby's movement before birth   The Basics   Written by the doctors and editors at [employer]   When should I start feeling my baby move? -- It depends. Most people first feel their baby moving in the uterus between about 16 and 20 weeks of pregnancy. It might take longer to feel movement if this is your first pregnancy or if the placenta is in the front of your uterus.  What kinds of movements should I feel? -- When you first feel your baby move, it might feel like a gentle flutter in your belly. This is sometimes called \"quickening.\" As the baby grows, their movements will get stronger. You will probably feel them kicking, rolling, and stretching. Later in pregnancy, you might be able to see and feel the baby moving from the outside.  You might notice " "that your baby is more active at certain times of the day or night. Even before birth, babies have periods of being asleep and awake. When your baby is sleeping, you might notice that they do not move as much.  Should I keep track of my baby's movements? -- If your pregnancy is healthy, you probably do not need to count or record your baby's movements. Feeling regular movement is a good sign that the baby is doing well.  In some cases, your doctor or midwife might ask you to keep track of your baby's movements. If so, they will tell you how to do this and when to call them.  A change in your baby's movements does not always mean that there is a problem. But in some cases, it can be a sign that the baby is having trouble. If your doctor or midwife is concerned, they can do tests to check on the baby.  If I am asked to track movement, how should I do it? -- There are different ways of tracking your baby's movement. This is sometimes called \"kick counting.\"  Your doctor or midwife will tell you exactly what to track. For example, they might ask you to write down:   How long it takes to feel 10 kicks or movements   How many times your baby moves in 1 hour  Many experts consider at least 10 movements in 2 hours to be a sign that the baby is doing well. But there is no specific cutoff for exactly how much movement is healthy or unhealthy. Some babies are more active than others, and some pregnant people feel movement more easily than others. The main goal of kick counting is to get to know your baby's normal patterns so you can tell if anything changes.  If you are doing kick counting:   Choose a time of day when your baby is usually active.   Find a quiet place where you will not be distracted.   Lie down on your side in a comfortable position.   Check the clock, or set a timer.   Each time you feel your baby move or kick, write down the time. Some people use a smartphone tiffanie to keep track.   If your baby seems less " active than usual, try moving around, eating a snack, and emptying your bladder. This can help wake the baby up if they are asleep.   Stop counting after you have felt 10 kicks, or after the length of time your doctor or midwife told you.  When should I call the doctor? -- Call your doctor or midwife for advice if:   You have concerns about your baby's movement.   Your baby is moving less than they normally do.   You notice a sudden change in the pattern of your baby's movements.   You have any other symptoms that worry you.  All topics are updated as new evidence becomes available and our peer review process is complete.  This topic retrieved from [employer] on: Feb 26, 2024.  Topic 951656 Version 1.0  Release: 32.2.4 - C32.56  © 2024 UpToDate, Inc. and/or its affiliates. All rights reserved.  Consumer Information Use and Disclaimer   Disclaimer: This generalized information is a limited summary of diagnosis, treatment, and/or medication information. It is not meant to be comprehensive and should be used as a tool to help the user understand and/or assess potential diagnostic and treatment options. It does NOT include all information about conditions, treatments, medications, side effects, or risks that may apply to a specific patient. It is not intended to be medical advice or a substitute for the medical advice, diagnosis, or treatment of a health care provider based on the health care provider's examination and assessment of a patient's specific and unique circumstances. Patients must speak with a health care provider for complete information about their health, medical questions, and treatment options, including any risks or benefits regarding use of medications. This information does not endorse any treatments or medications as safe, effective, or approved for treating a specific patient. UpToDate, Inc. and its affiliates disclaim any warranty or liability relating to this information or the use thereof.The use  "of this information is governed by the Terms of Use, available at https://www.[employer].com/en/know/clinical-effectiveness-terms. © UpToDate, Inc. and its affiliates and/or licensors. All rights reserved.  Copyright   ©  UpToDate, Inc. and/or its affiliates. All rights reserved.      Perineal Massage    Perineal massage is recommended starting after 34 weeks in order to reduce risks of perineal tearing during childbirth.  You have been provided and instructional sheet in your yellow 28 week prenatal packet.       Patient Education     How to tell when labor starts   The Basics   Written by the doctors and editors at [facility]   What is labor? -- When a person is pregnant, labor is the process that leads to birth of the baby. Contractions of the uterus are the main sign of labor.  Labor usually starts on its own between 37 and 42 weeks of pregnancy. Your \"due date\" is at 40 weeks.  A pregnancy that lasts 37 to 42 weeks is called a \"term\" pregnancy. When labor starts before 37 weeks, doctors call it \"\" labor.  What are the signs that labor is starting? -- The signs that labor is starting, or about to start, can include the following:   The baby moves lower (or \"drops\") in your belly.   You have increased vaginal discharge that is thick, mucus-like, or slightly bloody. (\"Vaginal discharge\" is the term doctors use to describe the fluid that comes out of the vagina.) The increased vaginal discharge is sometimes called a \"mucus plug\" or a \"bloody show.\"   Your \"water breaks.\" During pregnancy, your baby is in a sac in your uterus and surrounded by a fluid called \"amniotic fluid.\" This sac typically breaks open sometime before your baby is born. When it breaks open, the fluid inside comes out of your vagina. This can feel like a big gush or just a trickle of fluid.   You have low back pain or belly cramps.   You start having contractions. During a contraction, the uterus tightens. This can be painful and " "make your belly feel hard. After a contraction, the uterus relaxes and the pain goes away. Some people have \"Kodak-Sanchez contractions\" or \"false-labor contractions.\" These feel like contractions, but they are not true contractions. They do not mean that you are in labor.  How can I tell if I'm having true contractions? -- It can be hard to tell if you are having true contractions or Republic-Sanchez contractions. But here are some ways to help tell the difference:   True contractions come every few minutes and get more frequent over time. Republic-Sanchez contractions can come every few minutes, but they don't get more frequent over time.   True contractions don't go away, even when you rest. Republic-Sanchez contractions usually go away when you rest.   True contractions get stronger and more painful over time. Kodak-Sanchez contractions usually don't get stronger or more painful over time.   True contractions might be felt in your back and front. Republic-Sanchez contractions are usually only in front.  If you are still not sure whether you are having true contractions, call your doctor or midwife.  What should I do if I start having contractions? -- If you start having contractions, time them to see how far apart they are. That way, you can tell if they get more frequent.  You can time your contractions by keeping track of the time when each contraction starts. If you have a clock with a second hand or a timer on your smartphone, you can also time how long each contraction lasts. Your doctor or midwife will want to know how far apart your contractions are and how long they last.  When should I call my doctor or midwife? -- Call your doctor or midwife if you think that you are in labor. You should also call if any of the following things happen:   You have blood, mucus, or fluid leaking from your vagina.   You have 6 or more contractions in 1 hour. (This means that your contractions are 10 minutes apart or less.)   Your " "contractions are getting stronger and are painful.  Your doctor or midwife will probably want to see you to do an exam. To tell if you are in labor, they will check your cervix to see if it is opening (\"dilated\") and thinning out. They will see how frequent your contractions are. They might also do other tests.  What if my labor starts too soon? -- If you start having any symptoms of labor before 37 weeks, call your doctor right away. They might want to give you medicine to try to stop your labor or help the baby if it is born early.  What if my labor doesn't start on its own? -- If your labor doesn't start on its own, your doctor will talk to you about your options. They might try to start your labor with medicines. This is called \"inducing labor.\"  How long will my labor last? -- If it's your first baby, your labor will probably last for many hours. If it's not your first baby, your labor will probably be shorter.  All topics are updated as new evidence becomes available and our peer review process is complete.  This topic retrieved from [employer] on: Feb 26, 2024.  Topic 44794 Version 12.0  Release: 32.2.4 - C32.56  © 2024 UpToDate, Inc. and/or its affiliates. All rights reserved.  Consumer Information Use and Disclaimer   Disclaimer: This generalized information is a limited summary of diagnosis, treatment, and/or medication information. It is not meant to be comprehensive and should be used as a tool to help the user understand and/or assess potential diagnostic and treatment options. It does NOT include all information about conditions, treatments, medications, side effects, or risks that may apply to a specific patient. It is not intended to be medical advice or a substitute for the medical advice, diagnosis, or treatment of a health care provider based on the health care provider's examination and assessment of a patient's specific and unique circumstances. Patients must speak with a health care provider for " complete information about their health, medical questions, and treatment options, including any risks or benefits regarding use of medications. This information does not endorse any treatments or medications as safe, effective, or approved for treating a specific patient. UpToDate, Inc. and its affiliates disclaim any warranty or liability relating to this information or the use thereof.The use of this information is governed by the Terms of Use, available at https://www.[employer].com/en/know/clinical-effectiveness-terms. 2024© UpToDate, Inc. and its affiliates and/or licensors. All rights reserved.  Copyright   © 2024 UpToDate, Inc. and/or its affiliates. All rights reserved.  Patient Education     Pregnancy - The Eighth Month   About this topic   It is important for you to learn how to take care of yourself to help you have a healthy baby and safe delivery. It is good to have health care throughout your pregnancy.  The eighth month of your pregnancy starts around week 33 and lasts through week 36. By knowing how far along you are, you can learn what is normal for this stage of your pregnancy and plan for what is next.  General   Your body   During the eighth month of your pregnancy, here are some things you can expect.  You may:  Be feeling more tired. Rest as much as you can and take small naps if possible. This also helps with swollen feet and ankles.  Feel hot all the time. Be sure to drink plenty of water.  Notice your baby drops more into your pelvis. This makes breathing a bit easier, but you may have to go to the bathroom more often. You may also notice more problems with hemorrhoids.  Have more back pain  Notice clear jelly-like substance flecked with blood when you use the restroom. This is your mucus plug.  Feel less steady on your feet. This is because your hips and joints are preparing for birth.  Be tested for Group Beta Strep (GBS) to see if you will need antibiotics during labor  Gain about 1/2  to 1 pound (.23 to .45 kg) a week for the rest of your pregnancy. It is normal to gain about 5 to 10 pounds (2.3 to 4.5 kg) total in your first 4 months.  Have a BPP, or Biophysical Profile. The doctors do an ultrasound to check your baby’s health if you are at high risk or having problems.  Have a NST, or Non Stress Test. The staff place special monitors around your belly to check the baby’s heart rate and look for contractions.  Your baby's growth and development:  Your baby is almost fully mature but will spend the rest of the time inside of you getting bigger.  Their lungs are the last organ to mature, so it is important that your baby stays in your womb until your due date if possible.  Their bones are getting stronger each day. The bones in their skull stay a little softer to make delivery easier.  They are able to scratch themselves as their fingernails are developed.  Your baby is becoming protected from germs as they develop antibodies.  They are moving a little less because there is less room.  Your baby is about 19 inches (48 cm) long and weighs about 6 pounds (2,700 gm). Your baby is about the size of a papaya or pineapple.  Things to Think About   Avoid alcohol, drugs, tobacco products, and second hand smoke.  Be sure you know the signs of labor and when to call your doctor.  Are you planning a natural childbirth or thinking about an epidural? Know things you can do to help cope with labor pain.  You may want to learn about cord blood banking.  Do you have everything you need for after the baby is born? Be sure the car seat fits in the car.  When do I need to call the doctor?   Contractions every 10 minutes or more often that do not go away with drinking water or position changes  Headache that does not go away; blurry vision; seeing spots or halos; increase in swelling in your hands, feet, or face; and pain under your ribs on the right side  Low, dull back pain that does not go away  Pressure in your  pelvis that feels like your baby is pushing down  A gush or constant trickle of watery or bloody fluid leaking from your vagina  Little to no movement felt by baby in 2 hours. Your baby should be moving at least 10 times every 2 hours.  Vaginal bleeding with or without pain  Fever of 100.4°F (38°C) or higher  After a car accident, fall, or any trauma to your belly  Having thoughts of harming yourself or others, or do not feel safe at home  Last Reviewed Date   2020-05-06  Consumer Information Use and Disclaimer   This generalized information is a limited summary of diagnosis, treatment, and/or medication information. It is not meant to be comprehensive and should be used as a tool to help the user understand and/or assess potential diagnostic and treatment options. It does NOT include all information about conditions, treatments, medications, side effects, or risks that may apply to a specific patient. It is not intended to be medical advice or a substitute for the medical advice, diagnosis, or treatment of a health care provider based on the health care provider's examination and assessment of a patient’s specific and unique circumstances. Patients must speak with a health care provider for complete information about their health, medical questions, and treatment options, including any risks or benefits regarding use of medications. This information does not endorse any treatments or medications as safe, effective, or approved for treating a specific patient. UpToDate, Inc. and its affiliates disclaim any warranty or liability relating to this information or the use thereof. The use of this information is governed by the Terms of Use, available at https://www.woltersTri Alpha Energyuwer.com/en/know/clinical-effectiveness-terms   Copyright   Copyright © 2024 UpToDate, Inc. and its affiliates and/or licensors. All rights reserved.  Patient Education     Your baby's movement before birth   The Basics   Written by the doctors and  "editors at [employer]   When should I start feeling my baby move? -- It depends. Most people first feel their baby moving in the uterus between about 16 and 20 weeks of pregnancy. It might take longer to feel movement if this is your first pregnancy or if the placenta is in the front of your uterus.  What kinds of movements should I feel? -- When you first feel your baby move, it might feel like a gentle flutter in your belly. This is sometimes called \"quickening.\" As the baby grows, their movements will get stronger. You will probably feel them kicking, rolling, and stretching. Later in pregnancy, you might be able to see and feel the baby moving from the outside.  You might notice that your baby is more active at certain times of the day or night. Even before birth, babies have periods of being asleep and awake. When your baby is sleeping, you might notice that they do not move as much.  Should I keep track of my baby's movements? -- If your pregnancy is healthy, you probably do not need to count or record your baby's movements. Feeling regular movement is a good sign that the baby is doing well.  In some cases, your doctor or midwife might ask you to keep track of your baby's movements. If so, they will tell you how to do this and when to call them.  A change in your baby's movements does not always mean that there is a problem. But in some cases, it can be a sign that the baby is having trouble. If your doctor or midwife is concerned, they can do tests to check on the baby.  If I am asked to track movement, how should I do it? -- There are different ways of tracking your baby's movement. This is sometimes called \"kick counting.\"  Your doctor or midwife will tell you exactly what to track. For example, they might ask you to write down:   How long it takes to feel 10 kicks or movements   How many times your baby moves in 1 hour  Many experts consider at least 10 movements in 2 hours to be a sign that the baby is " doing well. But there is no specific cutoff for exactly how much movement is healthy or unhealthy. Some babies are more active than others, and some pregnant people feel movement more easily than others. The main goal of kick counting is to get to know your baby's normal patterns so you can tell if anything changes.  If you are doing kick counting:   Choose a time of day when your baby is usually active.   Find a quiet place where you will not be distracted.   Lie down on your side in a comfortable position.   Check the clock, or set a timer.   Each time you feel your baby move or kick, write down the time. Some people use a smartphone tiffanie to keep track.   If your baby seems less active than usual, try moving around, eating a snack, and emptying your bladder. This can help wake the baby up if they are asleep.   Stop counting after you have felt 10 kicks, or after the length of time your doctor or midwife told you.  When should I call the doctor? -- Call your doctor or midwife for advice if:   You have concerns about your baby's movement.   Your baby is moving less than they normally do.   You notice a sudden change in the pattern of your baby's movements.   You have any other symptoms that worry you.  All topics are updated as new evidence becomes available and our peer review process is complete.  This topic retrieved from [employer] on: Feb 26, 2024.  Topic 839964 Version 1.0  Release: 32.2.4 - C32.56  © 2024 UpToDate, Inc. and/or its affiliates. All rights reserved.  Consumer Information Use and Disclaimer   Disclaimer: This generalized information is a limited summary of diagnosis, treatment, and/or medication information. It is not meant to be comprehensive and should be used as a tool to help the user understand and/or assess potential diagnostic and treatment options. It does NOT include all information about conditions, treatments, medications, side effects, or risks that may apply to a specific patient. It is  not intended to be medical advice or a substitute for the medical advice, diagnosis, or treatment of a health care provider based on the health care provider's examination and assessment of a patient's specific and unique circumstances. Patients must speak with a health care provider for complete information about their health, medical questions, and treatment options, including any risks or benefits regarding use of medications. This information does not endorse any treatments or medications as safe, effective, or approved for treating a specific patient. UpToDate, Inc. and its affiliates disclaim any warranty or liability relating to this information or the use thereof.The use of this information is governed by the Terms of Use, available at https://www.[employer].com/en/know/clinical-effectiveness-terms. 2024© UpToDate, Inc. and its affiliates and/or licensors. All rights reserved.  Copyright   © 2024 UpToDate, Inc. and/or its affiliates. All rights reserved.      Perineal Massage    Perineal massage is recommended starting after 34 weeks in order to reduce risks of perineal tearing during childbirth.  You have been provided and instructional sheet in your yellow 28 week prenatal packet.

## 2024-08-16 NOTE — PROGRESS NOTES
33w1d  Pap 2022. Neg  GC/CT:2024  PN1 Labs: 3/9/2024  Blood Type: O Positive  MFM Level 1:  MFM Level 2:2024  AFP: 2024  28 Week Labs:  TDap:2024  Flu:  GBS:   Blue Folder: given   Yellow Folder: given   Ped Referral : given   Breast pump:  L&D forms: Done   Delivery consent: Done     Patient reports positive fetal movements with no concerns at today's visit .

## 2024-08-20 ENCOUNTER — ROUTINE PRENATAL (OUTPATIENT)
Dept: OBGYN CLINIC | Facility: CLINIC | Age: 29
End: 2024-08-20
Payer: COMMERCIAL

## 2024-08-20 VITALS
HEART RATE: 77 BPM | BODY MASS INDEX: 26.52 KG/M2 | OXYGEN SATURATION: 88 % | SYSTOLIC BLOOD PRESSURE: 120 MMHG | DIASTOLIC BLOOD PRESSURE: 70 MMHG | WEIGHT: 175 LBS | HEIGHT: 68 IN

## 2024-08-20 DIAGNOSIS — Z3A.33 33 WEEKS GESTATION OF PREGNANCY: Primary | ICD-10-CM

## 2024-08-20 LAB
SL AMB  POCT GLUCOSE, UA: NORMAL
SL AMB POCT URINE PROTEIN: NORMAL

## 2024-08-20 PROCEDURE — 81002 URINALYSIS NONAUTO W/O SCOPE: CPT | Performed by: OBSTETRICS & GYNECOLOGY

## 2024-08-20 PROCEDURE — 99213 OFFICE O/P EST LOW 20 MIN: CPT | Performed by: OBSTETRICS & GYNECOLOGY

## 2024-08-20 NOTE — PROGRESS NOTES
Problem   33 Weeks Gestation of Pregnancy    Transfer from [facility] at 21w3d    PNBW-x, NIPT- low risk; AFP missed window  Pap/GC/CT all negative  Level 2 US prn follow up  Interested in eIOL    Tdap:   28 wk labs: 1* on   Delivery consent:signed  Vag/Perineal massages:  IOL:  GBS:  L2: PRN follow up recommended         33 weeks gestation of pregnancy  Susana Carl is a 29 y.o.   33w1d who presents for routine PNV.  28 week labs reviewed: NML, did finger sticks for 1 week, normal values   TWG 11.3 kg (25 lb)   Denies OB complaints. Good fetal movement. Denies contractions, cramping, leakage of fluid or vaginal bleeding.   Tdap vaccine completed  Reviewed  labor precautions and FKCs.   Advised to start Perineal massage at 34-36 weeks   Pregnancy Essential guide and Baby and Me web site recommended

## 2024-08-28 PROBLEM — Z34.90 SUPERVISION OF NORMAL PREGNANCY: Status: ACTIVE | Noted: 2024-08-28

## 2024-08-28 PROBLEM — Z3A.34 34 WEEKS GESTATION OF PREGNANCY: Status: ACTIVE | Noted: 2024-04-08

## 2024-08-28 NOTE — ASSESSMENT & PLAN NOTE
Susana Carl is a 29 y.o.  here for OB visit at 34w3d weeks. TWG 12.7 kg (28 lb).  No health concerns.     Denies LOF, vaginal bleeding with rare contractions.   Performing FKC's daily 10 in 2 hours  28 week labs were normal   Perineal massage  GBS at next appt  TDAP received 24  Influenza vaccine recommend  Pediatric CPR classes along with hospital tour recommended  RTO 2 weeks

## 2024-08-29 ENCOUNTER — ROUTINE PRENATAL (OUTPATIENT)
Dept: OBGYN CLINIC | Facility: MEDICAL CENTER | Age: 29
End: 2024-08-29
Payer: COMMERCIAL

## 2024-08-29 VITALS
HEART RATE: 64 BPM | OXYGEN SATURATION: 99 % | DIASTOLIC BLOOD PRESSURE: 78 MMHG | BODY MASS INDEX: 27.06 KG/M2 | WEIGHT: 178 LBS | SYSTOLIC BLOOD PRESSURE: 106 MMHG

## 2024-08-29 DIAGNOSIS — F32.A DEPRESSION AFFECTING PREGNANCY: ICD-10-CM

## 2024-08-29 DIAGNOSIS — J45.20 MILD INTERMITTENT ASTHMA WITHOUT COMPLICATION: ICD-10-CM

## 2024-08-29 DIAGNOSIS — Z34.83 ENCOUNTER FOR SUPERVISION OF OTHER NORMAL PREGNANCY IN THIRD TRIMESTER: Primary | ICD-10-CM

## 2024-08-29 DIAGNOSIS — O99.340 DEPRESSION AFFECTING PREGNANCY: ICD-10-CM

## 2024-08-29 DIAGNOSIS — O26.852 SPOTTING AFFECTING PREGNANCY IN SECOND TRIMESTER: ICD-10-CM

## 2024-08-29 DIAGNOSIS — Z3A.34 34 WEEKS GESTATION OF PREGNANCY: ICD-10-CM

## 2024-08-29 LAB
SL AMB  POCT GLUCOSE, UA: NORMAL
SL AMB POCT URINE PROTEIN: NORMAL

## 2024-08-29 PROCEDURE — 81002 URINALYSIS NONAUTO W/O SCOPE: CPT | Performed by: NURSE PRACTITIONER

## 2024-08-29 PROCEDURE — 99214 OFFICE O/P EST MOD 30 MIN: CPT | Performed by: NURSE PRACTITIONER

## 2024-08-29 NOTE — PROGRESS NOTES
Problem   Spotting Affecting Pregnancy in Second Trimester   Depression Affecting Pregnancy    Zoloft      34 Weeks Gestation of Pregnancy    Transfer from [facility] at 21w3d    PNBW-x, NIPT- low risk; AFP missed window  Pap/GC/CT all negative  Level 2 US prn follow up  Interested in eIOL    Tdap:   28 wk labs: 1* on   Delivery consent:signed  Vag/Perineal massages:  IOL:  GBS:  L2: PRN follow up recommended       Asthma     34 weeks gestation of pregnancy  Susana Carl is a 29 y.o.  here for OB visit at 34w3d weeks. TWG 12.7 kg (28 lb).  No health concerns.     Denies LOF, vaginal bleeding with rare contractions.   Performing FKC's daily 10 in 2 hours  28 week labs were normal   Perineal massage  GBS at next appt  TDAP received 24  Influenza vaccine recommend  Pediatric CPR classes along with hospital tour recommended  RTO 2 weeks    Depression affecting pregnancy  2 question depression screen  Compliant with zoloft 25 mg po daily.     Asthma  Stable  Has rescue inhaler  Last use was approx one year ago.     Spotting affecting pregnancy in second trimester  Denies vaginal bleeding.

## 2024-09-04 PROBLEM — O26.852 SPOTTING AFFECTING PREGNANCY IN SECOND TRIMESTER: Status: RESOLVED | Noted: 2024-06-27 | Resolved: 2024-09-04

## 2024-09-04 PROBLEM — Z3A.36 36 WEEKS GESTATION OF PREGNANCY: Status: ACTIVE | Noted: 2024-04-08

## 2024-09-04 NOTE — PATIENT INSTRUCTIONS
Patient Education     Pregnancy - The Eighth Month   About this topic   It is important for you to learn how to take care of yourself to help you have a healthy baby and safe delivery. It is good to have health care throughout your pregnancy.  The eighth month of your pregnancy starts around week 33 and lasts through week 36. By knowing how far along you are, you can learn what is normal for this stage of your pregnancy and plan for what is next.  General   Your body   During the eighth month of your pregnancy, here are some things you can expect.  You may:  Be feeling more tired. Rest as much as you can and take small naps if possible. This also helps with swollen feet and ankles.  Feel hot all the time. Be sure to drink plenty of water.  Notice your baby drops more into your pelvis. This makes breathing a bit easier, but you may have to go to the bathroom more often. You may also notice more problems with hemorrhoids.  Have more back pain  Notice clear jelly-like substance flecked with blood when you use the restroom. This is your mucus plug.  Feel less steady on your feet. This is because your hips and joints are preparing for birth.  Be tested for Group Beta Strep (GBS) to see if you will need antibiotics during labor  Gain about 1/2 to 1 pound (.23 to .45 kg) a week for the rest of your pregnancy. It is normal to gain about 5 to 10 pounds (2.3 to 4.5 kg) total in your first 4 months.  Have a BPP, or Biophysical Profile. The doctors do an ultrasound to check your baby’s health if you are at high risk or having problems.  Have a NST, or Non Stress Test. The staff place special monitors around your belly to check the baby’s heart rate and look for contractions.  Your baby's growth and development:  Your baby is almost fully mature but will spend the rest of the time inside of you getting bigger.  Their lungs are the last organ to mature, so it is important that your baby stays in your womb until your due date if  possible.  Their bones are getting stronger each day. The bones in their skull stay a little softer to make delivery easier.  They are able to scratch themselves as their fingernails are developed.  Your baby is becoming protected from germs as they develop antibodies.  They are moving a little less because there is less room.  Your baby is about 19 inches (48 cm) long and weighs about 6 pounds (2,700 gm). Your baby is about the size of a papaya or pineapple.  Things to Think About   Avoid alcohol, drugs, tobacco products, and second hand smoke.  Be sure you know the signs of labor and when to call your doctor.  Are you planning a natural childbirth or thinking about an epidural? Know things you can do to help cope with labor pain.  You may want to learn about cord blood banking.  Do you have everything you need for after the baby is born? Be sure the car seat fits in the car.  When do I need to call the doctor?   Contractions every 10 minutes or more often that do not go away with drinking water or position changes  Headache that does not go away; blurry vision; seeing spots or halos; increase in swelling in your hands, feet, or face; and pain under your ribs on the right side  Low, dull back pain that does not go away  Pressure in your pelvis that feels like your baby is pushing down  A gush or constant trickle of watery or bloody fluid leaking from your vagina  Little to no movement felt by baby in 2 hours. Your baby should be moving at least 10 times every 2 hours.  Vaginal bleeding with or without pain  Fever of 100.4°F (38°C) or higher  After a car accident, fall, or any trauma to your belly  Having thoughts of harming yourself or others, or do not feel safe at home  Last Reviewed Date   2020-05-06  Consumer Information Use and Disclaimer   This generalized information is a limited summary of diagnosis, treatment, and/or medication information. It is not meant to be comprehensive and should be used as a tool  to help the user understand and/or assess potential diagnostic and treatment options. It does NOT include all information about conditions, treatments, medications, side effects, or risks that may apply to a specific patient. It is not intended to be medical advice or a substitute for the medical advice, diagnosis, or treatment of a health care provider based on the health care provider's examination and assessment of a patient’s specific and unique circumstances. Patients must speak with a health care provider for complete information about their health, medical questions, and treatment options, including any risks or benefits regarding use of medications. This information does not endorse any treatments or medications as safe, effective, or approved for treating a specific patient. UpToDate, Inc. and its affiliates disclaim any warranty or liability relating to this information or the use thereof. The use of this information is governed by the Terms of Use, available at https://www.[employer].com/en/know/clinical-effectiveness-terms   Copyright   Copyright © 2024 UpToDate, Inc. and its affiliates and/or licensors. All rights reserved.

## 2024-09-09 ENCOUNTER — ROUTINE PRENATAL (OUTPATIENT)
Dept: OBGYN CLINIC | Facility: CLINIC | Age: 29
End: 2024-09-09
Payer: COMMERCIAL

## 2024-09-09 VITALS
DIASTOLIC BLOOD PRESSURE: 70 MMHG | HEART RATE: 82 BPM | WEIGHT: 176 LBS | SYSTOLIC BLOOD PRESSURE: 110 MMHG | BODY MASS INDEX: 26.76 KG/M2

## 2024-09-09 DIAGNOSIS — Z29.11 NEED FOR RSV IMMUNIZATION: ICD-10-CM

## 2024-09-09 DIAGNOSIS — F32.A DEPRESSION AFFECTING PREGNANCY: ICD-10-CM

## 2024-09-09 DIAGNOSIS — O99.340 DEPRESSION AFFECTING PREGNANCY: ICD-10-CM

## 2024-09-09 DIAGNOSIS — Z3A.36 36 WEEKS GESTATION OF PREGNANCY: ICD-10-CM

## 2024-09-09 DIAGNOSIS — Z34.83 ENCOUNTER FOR SUPERVISION OF OTHER NORMAL PREGNANCY IN THIRD TRIMESTER: Primary | ICD-10-CM

## 2024-09-09 LAB
SL AMB  POCT GLUCOSE, UA: NORMAL
SL AMB POCT URINE PROTEIN: NORMAL

## 2024-09-09 PROCEDURE — 99213 OFFICE O/P EST LOW 20 MIN: CPT | Performed by: OBSTETRICS & GYNECOLOGY

## 2024-09-09 PROCEDURE — 90471 IMMUNIZATION ADMIN: CPT | Performed by: OBSTETRICS & GYNECOLOGY

## 2024-09-09 PROCEDURE — 87150 DNA/RNA AMPLIFIED PROBE: CPT | Performed by: NURSE PRACTITIONER

## 2024-09-09 PROCEDURE — 90678 RSV VACC PREF BIVALENT IM: CPT | Performed by: OBSTETRICS & GYNECOLOGY

## 2024-09-09 PROCEDURE — 81002 URINALYSIS NONAUTO W/O SCOPE: CPT | Performed by: OBSTETRICS & GYNECOLOGY

## 2024-09-09 NOTE — ASSESSMENT & PLAN NOTE
She feels well. She denies LOF/CTX/VB. She did not voice any concerns. Discussed fetal kick counting.  She was encouraged to start with her perineal/vaginal massages to prevent lacerations during the labor process.  Advised to increase fluids on these warmer days.  She has had more discharge lately, seems physiologic, cervical exam wnl,Vasalva negative.    Overview charting updated today.

## 2024-09-09 NOTE — PROGRESS NOTES
Problem   36 Weeks Gestation of Pregnancy    Transfer from [facility] at 21w3d    PNBW-x, NIPT- low risk; AFP missed window  Pap/GC/CT all negative  Level 2 US prn follow up  Interested in eIOL    RSV: 9/9  Tdap: 8/5  28 wk labs: 1* on 7/8  Delivery consent:signed  Vag/Perineal massages: reinforced today  Birth Plan: Still working on it.  IOL:  GBS: done 9/9  L2: PRN follow up recommended         36 weeks gestation of pregnancy  She feels well. She denies LOF/CTX/VB. She did not voice any concerns. Discussed fetal kick counting.  She was encouraged to start with her perineal/vaginal massages to prevent lacerations during the labor process.  Advised to increase fluids on these warmer days.  She has had more discharge lately, seems physiologic, cervical exam wnl,Vasalva negative.    Overview charting updated today.

## 2024-09-11 LAB — GP B STREP DNA SPEC QL NAA+PROBE: NEGATIVE

## 2024-09-16 ENCOUNTER — ROUTINE PRENATAL (OUTPATIENT)
Dept: OBGYN CLINIC | Facility: MEDICAL CENTER | Age: 29
End: 2024-09-16
Payer: COMMERCIAL

## 2024-09-16 ENCOUNTER — TELEPHONE (OUTPATIENT)
Dept: OBGYN CLINIC | Facility: CLINIC | Age: 29
End: 2024-09-16

## 2024-09-16 VITALS
HEART RATE: 64 BPM | DIASTOLIC BLOOD PRESSURE: 80 MMHG | BODY MASS INDEX: 27.43 KG/M2 | SYSTOLIC BLOOD PRESSURE: 124 MMHG | HEIGHT: 68 IN | WEIGHT: 181 LBS

## 2024-09-16 DIAGNOSIS — O99.340 DEPRESSION AFFECTING PREGNANCY: Primary | ICD-10-CM

## 2024-09-16 DIAGNOSIS — F32.A DEPRESSION AFFECTING PREGNANCY: Primary | ICD-10-CM

## 2024-09-16 DIAGNOSIS — Z3A.37 37 WEEKS GESTATION OF PREGNANCY: ICD-10-CM

## 2024-09-16 DIAGNOSIS — Z34.83 ENCOUNTER FOR SUPERVISION OF OTHER NORMAL PREGNANCY IN THIRD TRIMESTER: ICD-10-CM

## 2024-09-16 LAB
SL AMB  POCT GLUCOSE, UA: NORMAL
SL AMB POCT URINE PROTEIN: NORMAL

## 2024-09-16 PROCEDURE — 99214 OFFICE O/P EST MOD 30 MIN: CPT | Performed by: CLINICAL NURSE SPECIALIST

## 2024-09-16 PROCEDURE — 81002 URINALYSIS NONAUTO W/O SCOPE: CPT | Performed by: CLINICAL NURSE SPECIALIST

## 2024-09-16 NOTE — PROGRESS NOTES
Prenatal Visit  Subjective:   Susana is a 29 y.o.  37w0d here for Routine Prenatal Visit (LICO 10/7/24/Blood type O+/Labs UTD/Del consent signed/Breast pump ordered/Peds not needed /Tdap 24/Flu/RSV 24/Rhogam not needed /Urine /GBS neg /Denies LOF, VB, or CTX./Pt has +FM/Questions or conerns-  )    Denies unusual vaginal discharge, LOF, VB, or ctx. Reports Fetus is active.   Would like to discuss induction    Objective:  Vitals:    24 1046   BP: 124/80   Pulse: 64     Pregravid Weight/BMI: 68 kg (150 lb) (BMI 22.81)  Current Weight: 82.1 kg (181 lb)   Total Weight Gain: 14.1 kg (31 lb)       OBGyn Exam  Fetal Heart Rate: 160 , Fundal Height (cm): 36 cm    Assessment & Plan:  1. Depression affecting pregnancy  Overview:  Zoloft   Assessment & Plan:  Feels well, denies worsening symptoms. Discussed risk of worsening symptoms in PP period.  2. Encounter for supervision of other normal pregnancy in third trimester  -     POCT urine dip  3. 37 weeks gestation of pregnancy  Overview:  Transfer from [facility] at 21w3d    PNBW-x, NIPT- low risk; AFP missed window  Pap/GC/CT all negative  Level 2 US prn follow up  Interested in eIOL    RSV:   Tdap:   28 wk labs: 1* on   Delivery consent:signed  Vag/Perineal massages: reinforced today  Birth Plan: Still working on it.  IOL requested for 40wks  GBS: done   L2: PRN follow up recommended    Assessment & Plan:  37w0d Doing well, reports fetus remains active.  GBS results reviewed with patient. GBS negative  Pt declined cervical exam    Reviewed benefits of perineal massage - to be done 1-4 times per week x 5-10 minutes- education in AVS given  I have reviewed the signs and symptoms of labor with the patient, including contractions q4-5 minutes for greater than 1 hour, vaginal bleeding, leaking fluid and decreased fetal movement.  Reviewed process for FKC.I have emphasized the continued importance of paying close attention to the baby's movements.   I have instructed the patient to call the office with any of the above symptoms prior to coming to the hospital.     Signs of pre-eclampsia were also reviewed with the patient: headache, visual changes, sudden increased edema and/or severe right upper quadrant pain.    Discussed ARRIVE trial and option for eIOL at 39 wks.   Pt is interested.   Discussed indication for induction such as elective (> or equal to 39 weeks), medical/obstetric indications and alternatives which is to await spontaneous labor.  Discussed cervical ripening if cervix is unfavorable with prostaglandin (vaginal misoprostol) or mechanical dilation with insertion of balloon catheter.  Discussed that when electively induced nulliparous or multiparous women are compared with expectantly managed women, there is no evidence that elective induction is associated with increased risk of .  Consent signed and request for scheduling sent         JONAS Hebert  2024

## 2024-09-16 NOTE — ASSESSMENT & PLAN NOTE
37w0d Doing well, reports fetus remains active.  GBS results reviewed with patient. GBS negative  Pt declined cervical exam    Reviewed benefits of perineal massage - to be done 1-4 times per week x 5-10 minutes- education in AVS given  I have reviewed the signs and symptoms of labor with the patient, including contractions q4-5 minutes for greater than 1 hour, vaginal bleeding, leaking fluid and decreased fetal movement.  Reviewed process for FKC.I have emphasized the continued importance of paying close attention to the baby's movements.  I have instructed the patient to call the office with any of the above symptoms prior to coming to the hospital.     Signs of pre-eclampsia were also reviewed with the patient: headache, visual changes, sudden increased edema and/or severe right upper quadrant pain.    Discussed ARRIVE trial and option for eIOL at 39 wks.   Pt is interested.   Discussed indication for induction such as elective (> or equal to 39 weeks), medical/obstetric indications and alternatives which is to await spontaneous labor.  Discussed cervical ripening if cervix is unfavorable with prostaglandin (vaginal misoprostol) or mechanical dilation with insertion of balloon catheter.  Discussed that when electively induced nulliparous or multiparous women are compared with expectantly managed women, there is no evidence that elective induction is associated with increased risk of .  Consent signed and request for scheduling sent

## 2024-09-16 NOTE — TELEPHONE ENCOUNTER
----- Message from JONAS Darnell sent at 9/16/2024 11:09 AM EDT -----  Regarding: eIOL request  Procedure to be scheduled (IOL or CS): IOL    LICO: Estimated Date of Delivery: 10/7/24     Indication for delivery: elective    Requested date (s) of delivery: 10/7/24   If requested date is unavailable, is there a date by which the pt must be delivered?    Physician preference: n/a    If IOL, anticipated method: will need ripening     If CS, with or without tubal: n/a

## 2024-09-20 ENCOUNTER — TELEPHONE (OUTPATIENT)
Dept: OBGYN CLINIC | Facility: CLINIC | Age: 29
End: 2024-09-20

## 2024-09-20 NOTE — TELEPHONE ENCOUNTER
Overall how are you feeling? good    Compliant with routine OB appointments? yes    Have you completed your 3rd trimester lab work? yes    Have you reviewed the contents of 3rd trimester folder from office?    Have you decided on a pediatrician? yes    If yes, who family dr.     If no, what are you looking for and request sent for outreach.   Questions on paperwork to go back to office? no   Questions on the baby birth certificate forms? no    EPDS Scrore 0    Send link for the Hospital Readiness Video via [employer]

## 2024-09-21 ENCOUNTER — NURSE TRIAGE (OUTPATIENT)
Dept: OTHER | Facility: OTHER | Age: 29
End: 2024-09-21

## 2024-09-21 ENCOUNTER — HOSPITAL ENCOUNTER (OUTPATIENT)
Facility: HOSPITAL | Age: 29
Discharge: HOME | End: 2024-09-21
Attending: OBSTETRICS & GYNECOLOGY | Admitting: OBSTETRICS & GYNECOLOGY
Payer: COMMERCIAL

## 2024-09-21 VITALS
RESPIRATION RATE: 20 BRPM | HEART RATE: 80 BPM | DIASTOLIC BLOOD PRESSURE: 88 MMHG | HEIGHT: 68 IN | OXYGEN SATURATION: 98 % | BODY MASS INDEX: 27.43 KG/M2 | TEMPERATURE: 98.3 F | WEIGHT: 181 LBS | SYSTOLIC BLOOD PRESSURE: 135 MMHG

## 2024-09-21 DIAGNOSIS — O47.9 UTERINE CONTRACTIONS DURING PREGNANCY: Primary | ICD-10-CM

## 2024-09-21 PROCEDURE — 99214 OFFICE O/P EST MOD 30 MIN: CPT

## 2024-09-21 PROCEDURE — 96360 HYDRATION IV INFUSION INIT: CPT

## 2024-09-21 PROCEDURE — NC001 PR NO CHARGE: Performed by: OBSTETRICS & GYNECOLOGY

## 2024-09-21 PROCEDURE — 59025 FETAL NON-STRESS TEST: CPT

## 2024-09-21 RX ORDER — ACETAMINOPHEN 325 MG/1
650 TABLET ORAL EVERY 6 HOURS PRN
Qty: 56 TABLET | Refills: 0 | Status: SHIPPED | OUTPATIENT
Start: 2024-09-21 | End: 2024-09-28

## 2024-09-21 RX ADMIN — SODIUM CHLORIDE, SODIUM LACTATE, POTASSIUM CHLORIDE, AND CALCIUM CHLORIDE 1000 ML: .6; .31; .03; .02 INJECTION, SOLUTION INTRAVENOUS at 19:48

## 2024-09-21 RX ADMIN — MORPHINE SULFATE 2 MG: 2 INJECTION, SOLUTION INTRAMUSCULAR; INTRAVENOUS at 20:07

## 2024-09-21 NOTE — TELEPHONE ENCOUNTER
"was taken by ambulance to [facility] ED yesterday for contractions. She was 3cm and discharged home when contractions seemed to improve. She notes that for the past 2 hours she has been having contractions again that are occurring every 5 min, lasting about 60 seconds. also notes increased lower back pain with difficulty walking. Denies leaking of fluid or bleeding. 3rd baby. she notes it is hard to assess if baby is moving normally due to how her placenta is positioned.     On call provider notified   Reason for Disposition   MODERATE-SEVERE abdominal pain    Answer Assessment - Initial Assessment Questions  1. ONSET: \"When did the symptoms begin?\"         About 2 hours ago  2. CONTRACTIONS: \"Describe the contractions that you are having.\" (e.g., duration, frequency, regularity, severity)      Every 5 min, lasting 60 seconds  3. PREGNANCY: \"How many weeks pregnant are you?\"      37w5d  4. LICO: \"What date are you expecting to deliver?\"      10/7  5. PARITY: \"Have you had a baby before?\" If Yes, ask: \"How long did the labor last?\"      3rd baby  7. OTHER SYMPTOMS: \"Do you have any other symptoms?\" (e.g., leaking fluid from vagina, vaginal bleeding, fever, hand/facial swelling)      Increased lower back pain making it difficult to walk    Protocols used: Pregnancy - Labor-Adult-AH    "

## 2024-09-21 NOTE — LETTER
[facility]  [address] [address]  Dept: [phone]    September 26, 2024     Patient: Susana Carl   YOB: 1995   Date of Visit: 9/21/2024       To Whom it May Concern:    Susana Carl is under my professional care. She was seen in the hospital from 9/21/2024 to 09/21/24. She may return to work on 9/23/2024 without limitations.    If you have any questions or concerns, please don't hesitate to call.         Sincerely,          Luis Alonzo MD

## 2024-09-21 NOTE — TELEPHONE ENCOUNTER
"Regardinw pregnant / contractions  ----- Message from Ana MARLEY sent at 2024  3:16 PM EDT -----  \" I called the ambulance last night for contractions and I was taken to [facility] and was given benadryl and Dilaudid. I still am not feeling well, I can barely walk I am in so much pain. My water has not broken. My contractions are further apart. \"    "

## 2024-09-21 NOTE — TELEPHONE ENCOUNTER
Per on call-  If patient continues to be uncomfortable and contractions are consistently every 5 min should be evaluated.    Patient was advised and verbalized understanding. She notes she has tried tylenol, warm bath, rest and hydration with no change.

## 2024-09-21 NOTE — TELEPHONE ENCOUNTER
"Reason for Disposition  • [1] History of prior delivery (multipara) AND [2] contractions < 10 minutes apart AND [3] present 1 hour  • Baby moving less today (e.g., kick count < 5 in 1 hour or < 10 in 2 hours)    Answer Assessment - Initial Assessment Questions  1. ONSET: \"When did the symptoms begin?\"           2. CONTRACTIONS: \"Describe the contractions that you are having.\" (e.g., duration, frequency, regularity, severity)     Q 3 mins for the last two hours   3. PREGNANCY: \"How many weeks pregnant are you?\"      37W5D  4. LICO: \"What date are you expecting to deliver?\"      10/7  5. PARITY: \"Have you had a baby before?\" If Yes, ask: \"How long did the labor last?\"        6. FETAL MOVEMENT: \"Has the baby's movement decreased or changed significantly from normal?\"      Decreased FM  7. OTHER SYMPTOMS: \"Do you have any other symptoms?\" (e.g., leaking fluid from vagina, vaginal bleeding, fever, hand/facial swelling)      Denies    Protocols used: Pregnancy - Labor-Adult-AH    "

## 2024-09-21 NOTE — TELEPHONE ENCOUNTER
Gestation:37W5D  LICO: 10/7    Decreased FM  c/o contractions Q3 mins for the last two hours.   No additional symptoms.     On call provider contacted and informed of patient's concerns and status.  L&D charge nurse notified.

## 2024-09-21 NOTE — TELEPHONE ENCOUNTER
"Regardinw5d pregnant/stomach tightness  ----- Message from Amirah BARILLAS sent at 2024 12:25 AM EDT -----  Pt stated, \"I am 37 weeks and 5 days pregnant. I am experiencing tightness in my stomach and can barely move, it hurts bad.\"    "

## 2024-09-22 NOTE — PROCEDURES
Susana Carl, a  at 37w5d with an LICO of 10/7/2024, by Last Menstrual Period, was seen at [facility] for the following procedure(s): $Procedure Type: NST]    Nonstress Test  Reason for NST: Routine  Variability: Moderate  Decelerations: None  Accelerations: Yes  Acoustic Stimulator: No  Baseline: 140 BPM  Uterine Irritability: Yes  Contractions: Irregular  Contraction Frequency (minutes): 9 min                   Interpretation  Nonstress Test Interpretation: Reactive  Overall Impression: Reassuring        Luis Alonzo MD  Obstetrics & Gynecology PGY-1  2024  11:54 PM

## 2024-09-22 NOTE — PROGRESS NOTES
L&D Triage Note - OB/GYN  Susana Carl 29 y.o. female MRN: [id number]  Unit/Bed#:  TRIAGE 3-01 Encounter: [id number]      ASSESSMENT:    Susana Carl is a 29 y.o.  at 37w5d who was evaluated today in triage for contractions. SVE 3/70/-3 with contractions spaced out to every 9-10 minutes on tocometer. The patient does not appear to be in labor and it is safe to discharge home.     PLAN:    1) Contractions  - SVE: 3/70/-3, 2hr recheck 3/70/-3  - [place]: contractions spaced out to 9-10 minutes   - Given 1L LR fluid bolus  - IV 2mg morphine given  - Encouraged PO hydration, warm bath/shower, reassurance    2) 37 weeks gestation of pregnancy  - Continue routine prenatal care  - Discharge from OB triage with labor precautions    - Reviewed rupture of membranes, false vs true labor, decreased fetal movement, and vaginal bleeding   - Pt to call provider with any concerns and follow up at her next scheduled prenatal appointment    - Case discussed with Dr. Rodriguez    SUBJECTIVE:    Susana Carl 29 y.o.  at 37w5d with an Estimated Date of Delivery: 10/7/24 presenting with abdominal pain. She has been contraction throughout the day, noticed her contractions becoming more regular and painful at 5:00PM. She notes that they would occur every 4 minutes. She reports that on her last cervix check, she was 3cm dilated.       Her past obstetrical history is significant for 2 'd. This pregnancy has been complicated by asthma and depression.    Contractions: endorses  Leakage of fluid: Denies  Vaginal Bleeding: Denies  Fetal movement: present    OBJECTIVE:    Vitals:    24 1911   BP: 135/88   Pulse: 80   Resp: 20   Temp: 98.3 °F (36.8 °C)   SpO2: 98%       ROS:  Constitutional: Negative  Respiratory: Negative  Cardiovascular: Negative    Gastrointestinal: Negative    General Physical Exam:  General: Well appearing, no distress  Respiratory: Unlabored breathing  Cardiovascular: Regular  rate.  Abdomen: Soft, gravid, nontender  Fundal Height: Appropriate for gestational age.  Extremities: Warm and well perfused.  Non tender.      Fetal monitoring:  Fetal heart rate: NST reactive. Baseline 140 with moderate variability and accelerations and no decelerations  Shields: Contraction Frequency (minutes): 4-10  Contraction Duration (seconds): 60-90  Contraction Intensity: Mild      Cervical Exam  SVE: 3/70/-3        Luis Alonzo MD  9/21/2024  11:48 PM

## 2024-09-25 LAB
DME PARACHUTE DELIVERY DATE ACTUAL: NORMAL
DME PARACHUTE DELIVERY DATE REQUESTED: NORMAL
DME PARACHUTE ITEM DESCRIPTION: NORMAL
DME PARACHUTE ORDER STATUS: NORMAL
DME PARACHUTE SUPPLIER NAME: NORMAL
DME PARACHUTE SUPPLIER PHONE: NORMAL

## 2024-09-26 ENCOUNTER — TELEPHONE (OUTPATIENT)
Dept: OBGYN CLINIC | Facility: CLINIC | Age: 29
End: 2024-09-26

## 2024-10-07 NOTE — PROGRESS NOTES
"Susana MERLOS Siddhartha  1995    S:  29 y.o. female here for postpartum visit. She is s/p Uncomplicated vaginal delivery on 9/23/24 at [facility] .       Gender: female \"Hardik\"  Apgars: 6, 9  Weight: 6 lbs 3.5 oz. Now weighs 5 lbs. She will have another weight this week.   Her lochia is light rubra    She is breastfeeding without problems.   She denies postpartum blues/depression.  Her EPDS is 2.  Compliant with Zoloft 25 mg po daily.   Substance use screen:0    Last Pap: 05/17/2022 normal     We discussed contraceptive options in detail including birth control pills, DepoProvera, Mirena/Kyleena IUD, Nexplanon in detail.  At this point she would like a POP.     O:   There were no vitals taken for this visit.  She appears well and in no distress  Abdomen is soft and nontender  External genitals are normal  Vagina is normal  Cervix, uterus (not fully invouted) and adnexa are nontender, no masses palpable.   No breast concerns offered    A/P:  Postpartum visit.   May gradually resume normal activities.  Continue prenatal vitamin daily while breastfeeding   Consider taking prenatal vitamin 6-12 months before a future pregnancy to reduce risk of neural tube defect.  Will prescribed birth control at next office visit in 3 weeks.  If symptoms of depression occur, please call the office to schedule an appt. This may happen up until your baby's first birthday.    "

## 2024-10-08 ENCOUNTER — POSTPARTUM VISIT (OUTPATIENT)
Dept: OBGYN CLINIC | Facility: MEDICAL CENTER | Age: 29
End: 2024-10-08
Payer: COMMERCIAL

## 2024-10-08 VITALS — DIASTOLIC BLOOD PRESSURE: 84 MMHG | SYSTOLIC BLOOD PRESSURE: 110 MMHG | WEIGHT: 162.4 LBS | BODY MASS INDEX: 24.69 KG/M2

## 2024-10-08 NOTE — PATIENT INSTRUCTIONS
May gradually resume normal activities.  Continue prenatal vitamin daily while breastfeeding   Consider taking prenatal vitamin 6-12 months before a future pregnancy to reduce risk of neural tube defect.  Will prescribed birth control at next office visit in 3 weeks.  If symptoms of depression occur, please call the office to schedule an appt. This may happen up until your baby's first birthday.

## 2024-10-17 ENCOUNTER — NURSE TRIAGE (OUTPATIENT)
Age: 29
End: 2024-10-17

## 2024-10-17 NOTE — TELEPHONE ENCOUNTER
Regarding: constipated  ----- Message from Lillian CORTEZ sent at 10/17/2024  1:05 PM EDT -----  Pt delivered 09/23- very constipated, est at [facility], took miralax and not working

## 2024-10-17 NOTE — TELEPHONE ENCOUNTER
"Patient called in reporting constipation for the past week. LBM was 10/12/2024. States at this time it was hard, she was straining and had some slight rectal blood when she wiped. Patient reports was having constipation issues when she was seen last week and was advised to try the stool softeners and miralax and has been doing this since 10/08/2024. Stool softeners TID and miralax BID. Discussed with patient increasing her fluid intake to at least 2-3 L daily, increasing fiber in her foods and encouraged walking for gi motility. Discussed the use of possible dulcolax suppository as well. Informed patient to continue to monitor at this time and contact the office back with any new or worsening symptoms. Please advise of any further recommendations.     Reason for Disposition   Over-The-Counter (OTC) medicines for constipation, questions about    Answer Assessment - Initial Assessment Questions  1. STOOL PATTERN OR FREQUENCY: \"How often do you have a bowel movement (BM)?\"  (Normal range: 3 times a day to every 3 days)  \"When was your last BM?\"        10/12/2024  2. STRAINING: \"Do you have to strain to have a BM?\"       Yes   3. RECTAL PAIN: \"Does your rectum hurt when the stool comes out?\" If Yes, ask: \"Do you have hemorrhoids? How bad is the pain?\"  (Scale 1-10; or mild, moderate, severe)      Yes  4. STOOL COMPOSITION: \"Are the stools hard?\"       Hard  5. BLOOD ON STOOLS: \"Has there been any blood on the toilet tissue or on the surface of the BM?\" If Yes, ask: \"When was the last time?\"        Upon wiping   6. CHRONIC CONSTIPATION: \"Is this a new problem for you?\"  If No, ask: \"How long have you had this problem?\" (days, weeks, months)       New, since delivery   7. CHANGES IN DIET OR HYDRATION: \"Have there been any recent changes in your diet?\" \"How much fluids are you drinking on a daily basis?\"  \"How much have you had to drink today?\"      4 bottles of water  8. MEDICINES: \"Have you been taking any new " "medicines?\" \"Are you taking any narcotic pain medicines?\" (e.g., Dilaudid, morphine, Percocet, Vicodin)      Zoloft, flonase, prenatal  9. LAXATIVES: \"Have you been using any stool softeners, laxatives, or enemas?\"  If Yes, ask \"What, how often, and when was the last time?\"      Stool softener, miralax twice- since    10.ACTIVITY:  \"How much walking do you do every day?\"  \"Has your activity level decreased in the past week?\"         Occasional  11. OTHER SYMPTOMS: \"Do you have any other symptoms?\" (e.g., abdomen pain, fever, vaginal bleeding)        Slight abdominal pain  12. DELIVERY DATE: \"When was your delivery date?\" \"Vaginal delivery or ?\"       2024    Protocols used: Postpartum - Constipation-Adult-AH    "

## 2024-10-28 ENCOUNTER — POSTPARTUM VISIT (OUTPATIENT)
Dept: OBGYN CLINIC | Facility: MEDICAL CENTER | Age: 29
End: 2024-10-28

## 2024-10-28 VITALS
HEIGHT: 68 IN | DIASTOLIC BLOOD PRESSURE: 80 MMHG | BODY MASS INDEX: 25.31 KG/M2 | SYSTOLIC BLOOD PRESSURE: 110 MMHG | WEIGHT: 167 LBS

## 2024-10-28 DIAGNOSIS — Z30.011 ENCOUNTER FOR INITIAL PRESCRIPTION OF CONTRACEPTIVE PILLS: ICD-10-CM

## 2024-10-28 DIAGNOSIS — F32.89 OTHER DEPRESSION: ICD-10-CM

## 2024-10-28 PROBLEM — O47.9 UTERINE CONTRACTIONS DURING PREGNANCY: Status: RESOLVED | Noted: 2024-09-21 | Resolved: 2024-10-28

## 2024-10-28 PROBLEM — Z34.90 SUPERVISION OF NORMAL PREGNANCY: Status: RESOLVED | Noted: 2024-08-28 | Resolved: 2024-10-28

## 2024-10-28 PROBLEM — Z3A.37 37 WEEKS GESTATION OF PREGNANCY: Status: RESOLVED | Noted: 2024-04-08 | Resolved: 2024-10-28

## 2024-10-28 PROCEDURE — 99024 POSTOP FOLLOW-UP VISIT: CPT | Performed by: STUDENT IN AN ORGANIZED HEALTH CARE EDUCATION/TRAINING PROGRAM

## 2024-10-28 RX ORDER — ACETAMINOPHEN AND CODEINE PHOSPHATE 120; 12 MG/5ML; MG/5ML
1 SOLUTION ORAL DAILY
Qty: 28 TABLET | Refills: 6 | Status: SHIPPED | OUTPATIENT
Start: 2024-10-28 | End: 2025-05-12

## 2024-10-28 NOTE — ASSESSMENT & PLAN NOTE
-discussed risks and benefits of all appropriate contraceptive methods. Pt would like to proceed with POPs at this time   -POPs sent to pharmacy. Instructions on use provided. Side effects discussed.     Orders:    norethindrone (Dominique) 0.35 MG tablet; Take 1 tablet (0.35 mg total) by mouth daily

## 2024-10-28 NOTE — PROGRESS NOTES
Ambulatory Visit  Name: Susana Carl      : 1995      MRN: [id number]  Encounter Provider: Laura Engel DO  Encounter Date: 10/28/2024   Encounter department: [facility] [place]    Assessment & Plan   (spontaneous vaginal delivery)  -doing well post partum  -will resume intercourse at 6 weeks  -follow up prn/annual          Other depression  -EPDS 6, denies SI/HI, fells well on current dose of zoloft          Encounter for initial prescription of contraceptive pills  -discussed risks and benefits of all appropriate contraceptive methods. Pt would like to proceed with POPs at this time   -POPs sent to pharmacy. Instructions on use provided. Side effects discussed.     Orders:    norethindrone (Dominique) 0.35 MG tablet; Take 1 tablet (0.35 mg total) by mouth daily      History of Present Illness     Susana Carl is a 29 y.o. female  s/p  on 24 at [facility] presents for follow up post partum visit. Has no acute complaints. Denies pain. Minimal vaginal bleeding. Voiding and having bowel movements. Breast feeding without difficulty. Denies SI/HI. Has help at home with baby. Sleeping well.       Review of Systems   Constitutional:  Negative for appetite change, chills, fatigue and fever.   Respiratory:  Negative for cough, chest tightness, shortness of breath and wheezing.    Cardiovascular:  Negative for chest pain, palpitations and leg swelling.   Gastrointestinal:  Negative for abdominal distention, abdominal pain, constipation, diarrhea, nausea and vomiting.   Endocrine: Negative for cold intolerance, heat intolerance and polyuria.   Genitourinary:  Negative for difficulty urinating, dyspareunia, dysuria, genital sores, menstrual problem, vaginal bleeding, vaginal discharge and vaginal pain.   Neurological:  Negative for dizziness, weakness, light-headedness and headaches.           Objective     /80 (BP Location: Left arm, Patient Position:  "Sitting, Cuff Size: Adult)   Ht 5' 8\" (1.727 m)   Wt 75.8 kg (167 lb)   Breastfeeding Yes   BMI 25.39 kg/m²     Physical Exam  Constitutional:       General: She is not in acute distress.     Appearance: Normal appearance. She is not ill-appearing.   Cardiovascular:      Rate and Rhythm: Normal rate.   Pulmonary:      Effort: Pulmonary effort is normal. No respiratory distress.   Abdominal:      General: Abdomen is flat. There is no distension.      Palpations: Abdomen is soft.      Tenderness: There is no abdominal tenderness. There is no guarding or rebound.   Genitourinary:     General: Normal vulva.      Exam position: Lithotomy position.      Labia:         Right: No rash, tenderness, lesion or injury.         Left: No rash, tenderness, lesion or injury.       Vagina: Normal. No foreign body. No vaginal discharge, erythema, tenderness, bleeding or lesions.      Cervix: Normal. No cervical motion tenderness, discharge, friability, lesion, erythema, cervical bleeding or eversion.      Uterus: Normal. Not enlarged, not fixed, not tender and no uterine prolapse.       Adnexa: Right adnexa normal and left adnexa normal.        Right: No mass, tenderness or fullness.          Left: No mass, tenderness or fullness.     Musculoskeletal:      Right lower leg: No edema.      Left lower leg: No edema.   Neurological:      General: No focal deficit present.      Mental Status: She is alert and oriented to person, place, and time.   Psychiatric:         Mood and Affect: Mood normal.         Behavior: Behavior normal.         Thought Content: Thought content normal.         Judgment: Judgment normal.         "

## 2024-11-11 ENCOUNTER — TELEPHONE (OUTPATIENT)
Dept: FAMILY MEDICINE CLINIC | Facility: CLINIC | Age: 29
End: 2024-11-11

## 2024-11-11 NOTE — TELEPHONE ENCOUNTER
Patient called, wants to know if PCP can fill RX for hydrOXYzine HCL (ATARAX) 25 mg tablet  . PCP had stopped it while patient was pregnant. Please advise.    Susana- [phone]    [id number] - [place] - [address] Phone: [phone]   Fax: [phone]

## 2024-11-12 NOTE — TELEPHONE ENCOUNTER
Please inform patient that there is not enough human data to assess risk of infant harm while breast-feeding on this medication.  Drowsiness and irritability have been reported in breast.  Infant is exposed to antihistamines, like hydroxyzine.

## 2024-11-18 ENCOUNTER — NURSE TRIAGE (OUTPATIENT)
Age: 29
End: 2024-11-18

## 2024-11-18 DIAGNOSIS — F32.89 OTHER DEPRESSION: Primary | ICD-10-CM
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
## 2024-11-18 NOTE — TELEPHONE ENCOUNTER
Spoke with patient to review message per Dr. Engel. Attempted to schedule appointment in office per Dr. Engel's recommendation. No availability in recommended time frame. Attempted warm transfer to office and spoke with Heather who reviewed schedule and advises message to clerical pool for scheduling. Call was then disconnected. Attempt to call patient back. Left message making her aware office would reach out to schedule as well as [facility]. Provided contact info for her call [facility] to make appointment. Offered call back for any further questions or concerns at [phone].

## 2024-11-26 RX ORDER — SERTRALINE HYDROCHLORIDE 25 MG/1
37.5 TABLET, FILM COATED ORAL DAILY
Qty: 135 TABLET | Refills: 1 | OUTPATIENT
Start: 2024-11-26

## 2024-12-09 ENCOUNTER — OFFICE VISIT (OUTPATIENT)
Dept: FAMILY MEDICINE CLINIC | Facility: CLINIC | Age: 29
End: 2024-12-09
Payer: COMMERCIAL

## 2024-12-09 VITALS
SYSTOLIC BLOOD PRESSURE: 110 MMHG | WEIGHT: 167.4 LBS | DIASTOLIC BLOOD PRESSURE: 70 MMHG | TEMPERATURE: 100.7 F | HEART RATE: 95 BPM | BODY MASS INDEX: 25.37 KG/M2 | OXYGEN SATURATION: 97 % | HEIGHT: 68 IN

## 2024-12-09 DIAGNOSIS — R68.83 CHILLS: Primary | ICD-10-CM

## 2024-12-09 DIAGNOSIS — N61.0 MASTITIS: ICD-10-CM

## 2024-12-09 LAB
SARS-COV-2 AG UPPER RESP QL IA: NEGATIVE
SL AMB POCT RAPID FLU A: NORMAL
SL AMB POCT RAPID FLU B: NORMAL
VALID CONTROL: NORMAL

## 2024-12-09 PROCEDURE — 87804 INFLUENZA ASSAY W/OPTIC: CPT | Performed by: NURSE PRACTITIONER

## 2024-12-09 PROCEDURE — 99213 OFFICE O/P EST LOW 20 MIN: CPT | Performed by: NURSE PRACTITIONER

## 2024-12-09 PROCEDURE — 87811 SARS-COV-2 COVID19 W/OPTIC: CPT | Performed by: NURSE PRACTITIONER

## 2024-12-09 RX ORDER — CEPHALEXIN 500 MG/1
500 CAPSULE ORAL 3 TIMES DAILY
Qty: 30 CAPSULE | Refills: 0 | Status: SHIPPED | OUTPATIENT
Start: 2024-12-09 | End: 2024-12-13 | Stop reason: SDUPTHER

## 2024-12-09 NOTE — PROGRESS NOTES
Name: Susana Carl      : 1995      MRN: [id number]  Encounter Provider: JONAS Degroot  Encounter Date: 2024   Encounter department: [facility] [address]  :  Assessment & Plan  Mastitis  Discussed mastitis care.  Okay to use ibuprofen or Tylenol.  Advised to finish full course of Keflex.  Advised to call if not improving.  Orders:    cephalexin (KEFLEX) 500 mg capsule; Take 1 capsule (500 mg total) by mouth 3 (three) times a day for 10 days           History of Present Illness     Patient presents for sick visit.  Started last night with chills, fevers, headaches.  Patient is breast-feeding her 3-month-old daughter.  Her left breast is very tender and feels a lump.      Review of Systems   Constitutional:  Positive for fever. Negative for chills.   HENT:  Negative for congestion, ear pain, sinus pressure, sinus pain and sore throat.    Eyes:  Negative for pain and visual disturbance.   Respiratory:  Negative for cough and shortness of breath.    Cardiovascular:  Negative for chest pain and palpitations.   Gastrointestinal:  Negative for abdominal pain and vomiting.   Genitourinary:  Negative for dysuria and hematuria.   Musculoskeletal:  Positive for myalgias. Negative for arthralgias and back pain.   Skin:  Negative for color change and rash.   Neurological:  Positive for headaches. Negative for seizures and syncope.   All other systems reviewed and are negative.    Current Outpatient Medications on File Prior to Visit   Medication Sig Dispense Refill    Albuterol Sulfate (ProAir RespiClick) 108 (90 Base) MCG/ACT AEPB Inhale 2 puffs as needed (sob/wheezing) 1 each 1    cetirizine (ZyrTEC) 10 mg tablet Take 1 tablet (10 mg total) by mouth daily 90 tablet 1    doxylamine (UNISOM) 25 MG tablet Take 25 mg by mouth daily as needed      fluticasone (FLONASE) 50 mcg/act nasal spray instill 2 sprays into each nostril daily 16 g 1    metoclopramide (REGLAN) 5 mg  "tablet Take 1 tablet (5 mg total) by mouth every 6 (six) hours as needed (nausea/vomiting) 20 tablet 0    norethindrone (Dominique) 0.35 MG tablet Take 1 tablet (0.35 mg total) by mouth daily 28 tablet 6    ondansetron (ZOFRAN-ODT) 8 mg disintegrating tablet DISSOLVE 1 TABLET (8 MG TOTAL) IN CHEEK EVERY 8 (EIGHT) HOURS AS NEEDED FOR NAUSEA OR VOMITING.      Prenatal Vit-Fe Fumarate-FA (Prenatal Vitamin) 27-0.8 MG TABS Take by mouth      pyridoxine (B-6) 25 MG tablet Take 25 mg by mouth every 8 (eight) hours as needed      sertraline (Zoloft) 50 mg tablet Take 1 tablet (50 mg total) by mouth daily 30 tablet 3    EPINEPHrine (EPIPEN) 0.3 mg/0.3 mL SOAJ Inject 0.3 mL (0.3 mg total) into a muscle once for 1 dose 2 each 0     No current facility-administered medications on file prior to visit.         Objective   /70 (BP Location: Right arm, Patient Position: Sitting, Cuff Size: Standard)   Pulse 95   Temp (!) 100.7 °F (38.2 °C)   Ht 5' 8\" (1.727 m)   Wt 75.9 kg (167 lb 6.4 oz)   SpO2 97%   BMI 25.45 kg/m²      Physical Exam  Vitals and nursing note reviewed.   Constitutional:       General: She is not in acute distress.     Appearance: She is well-developed.   HENT:      Head: Normocephalic and atraumatic.      Right Ear: Tympanic membrane is injected.      Left Ear: Tympanic membrane normal.   Eyes:      Conjunctiva/sclera: Conjunctivae normal.   Cardiovascular:      Rate and Rhythm: Normal rate and regular rhythm.      Heart sounds: No murmur heard.  Pulmonary:      Effort: Pulmonary effort is normal. No respiratory distress.      Breath sounds: Normal breath sounds.   Chest:       Abdominal:      Palpations: Abdomen is soft.      Tenderness: There is no abdominal tenderness.   Musculoskeletal:         General: No swelling.      Cervical back: Neck supple.   Skin:     General: Skin is warm and dry.      Capillary Refill: Capillary refill takes less than 2 seconds.   Neurological:      Mental Status: She is " alert and oriented to person, place, and time.   Psychiatric:         Mood and Affect: Mood normal.       Administrative Statements   I have spent a total time of 20 minutes in caring for this patient on the day of the visit/encounter including Counseling / Coordination of care, Documenting in the medical record, Reviewing / ordering tests, medicine, procedures  , and Obtaining or reviewing history  . Topics discussed with the patient / family include symptom assessment and management and medication review.

## 2024-12-12 DIAGNOSIS — F32.89 OTHER DEPRESSION: ICD-10-CM

## 2024-12-13 ENCOUNTER — TELEPHONE (OUTPATIENT)
Age: 29
End: 2024-12-13

## 2024-12-13 ENCOUNTER — OFFICE VISIT (OUTPATIENT)
Age: 29
End: 2024-12-13

## 2024-12-13 ENCOUNTER — TELEPHONE (OUTPATIENT)
Dept: OBGYN CLINIC | Facility: CLINIC | Age: 29
End: 2024-12-13

## 2024-12-13 VITALS — BODY MASS INDEX: 25.39 KG/M2 | SYSTOLIC BLOOD PRESSURE: 98 MMHG | DIASTOLIC BLOOD PRESSURE: 62 MMHG | WEIGHT: 167 LBS

## 2024-12-13 DIAGNOSIS — F32.A DEPRESSION, UNSPECIFIED DEPRESSION TYPE: Primary | ICD-10-CM

## 2024-12-13 DIAGNOSIS — F32.89 OTHER DEPRESSION: Primary | ICD-10-CM

## 2024-12-13 DIAGNOSIS — Z13.9 SCREENING FOR UNSPECIFIED CONDITION: ICD-10-CM

## 2024-12-13 DIAGNOSIS — N61.0 MASTITIS: ICD-10-CM

## 2024-12-13 RX ORDER — CEPHALEXIN 500 MG/1
500 CAPSULE ORAL 3 TIMES DAILY
Qty: 30 CAPSULE | Refills: 0 | Status: SHIPPED | OUTPATIENT
Start: 2024-12-13 | End: 2024-12-23

## 2024-12-13 RX ORDER — SULFAMETHOXAZOLE AND TRIMETHOPRIM 800; 160 MG/1; MG/1
1 TABLET ORAL 2 TIMES DAILY
COMMUNITY
Start: 2024-12-11

## 2024-12-13 NOTE — TELEPHONE ENCOUNTER
Pt called to schedule 1 week follow-up for PPD. There are no appts available in time frame.  Please follow-up with pt for scheduling.

## 2024-12-13 NOTE — PROGRESS NOTES
"Name: Susana Carl      : 1995      MRN: [id number]  Encounter Provider: Martina Tijerina PA-C  Encounter Date: 2024   Encounter department: [facility] [place]  :  Assessment & Plan  Post partum depression  EPDS 19  Advised to follow up with a therapist.   Referral placed for [facility]. Other options include calling insurance for list of participating providers or [employer] or [employer] or [facility] (call for intake)  Find licensed [employer] Therapists in [facility] [phone]  OB navigator was contacted for additional resource options  Discussed self care including sleep, eating well and remaining hydrated.   Utilize resources in family and friends for help or assistance.   Increase dose of zoloft from 50mg to 100mg. Advised patient to use current prescription of Zoloft 50mg but now take 2 tablets/day for a total dose of 100mg daily.  Notify family member and/or call 911 with any suicidal thoughts or thoughts of harming others.  Discussed 988 hotline    RTO 1 week          [place] [facility] call if specialize in PPD  History of Present Illness   HPI  Susana Carl is a 29 y.o. female who presents for follow up regarding PPD. Patient called office 24 requesting increase dosage of zoloft due to increased crying and feeling more anxious/emotional. Patient has now been on zoloft 50 mg for ~1 month and has not noticed a signficant difference in her emotions. Delivery date 24.    Mother was present at the visit today    Patient doesn't desire taking care of baby. She wont breast feed but will pump with her mother's encouragement. Susana will not care for the baby, her mother has been taking full care of the next baby and her two other children (a 3 and 6 year old boys)     \"It feels like a lot on me\" taking care of the 3 kids. She didn't experience anything like this with previous pregnancies. " "She states she doesn't feel like breast feeding her because \" she [the baby] doesn't feel like she is mine\". The patient's mother whom she lives with has done all care for the baby.    Interests: prior to pregnancy enjoyed working out, does not desire to do this anymore.  Guilt: endorses some feelings of guilt  Energy: low  Appetite: patient has been eating well  Sleep: Decreased, difficult falling asleeping  Support: mother and siblings whom she lives with. FOB not involved. Feels safe  Feels motivated to take action to feel better  Suicidal/homicidal thoughts: no  No negative feelings towards baby. She denies feelings of wishing the baby was not here or wanting to get rid of the baby. 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 Patient is supposed to start work January 1st. They stated that her job is willing to approve time off until march 31st without the danger of her being fired. A note is required from a provider.  The note provided confirms to the employer to enter the time off extension but she should return to work sooner than the end of the extension once cleared to do so.      Review of Systems  Current Outpatient Medications on File Prior to Visit   Medication Sig Dispense Refill    Albuterol Sulfate (ProAir RespiClick) 108 (90 Base) MCG/ACT AEPB Inhale 2 puffs as needed (sob/wheezing) 1 each 1    cetirizine (ZyrTEC) 10 mg tablet Take 1 tablet (10 mg total) by mouth daily 90 tablet 1    doxylamine (UNISOM) 25 MG tablet Take 25 mg by mouth daily as needed      EPINEPHrine (EPIPEN) 0.3 mg/0.3 mL SOAJ Inject 0.3 mL (0.3 mg total) into a muscle once for 1 dose 2 each 0    fluticasone (FLONASE) 50 mcg/act nasal spray instill 2 sprays into each nostril daily 16 g 1    metoclopramide (REGLAN) 5 mg tablet Take 1 tablet (5 mg total) by mouth every 6 (six) hours as " needed (nausea/vomiting) 20 tablet 0    norethindrone (Dominique) 0.35 MG tablet Take 1 tablet (0.35 mg total) by mouth daily 28 tablet 6    ondansetron (ZOFRAN-ODT) 8 mg disintegrating tablet DISSOLVE 1 TABLET (8 MG TOTAL) IN CHEEK EVERY 8 (EIGHT) HOURS AS NEEDED FOR NAUSEA OR VOMITING.      Prenatal Vit-Fe Fumarate-FA (Prenatal Vitamin) 27-0.8 MG TABS Take by mouth      pyridoxine (B-6) 25 MG tablet Take 25 mg by mouth every 8 (eight) hours as needed      sertraline (ZOLOFT) 50 mg tablet take 1 tablet by mouth once daily 90 tablet 0    sulfamethoxazole-trimethoprim (BACTRIM DS) 800-160 mg per tablet Take 1 tablet by mouth 2 (two) times a day      [DISCONTINUED] cephalexin (KEFLEX) 500 mg capsule Take 1 capsule (500 mg total) by mouth 3 (three) times a day for 10 days 30 capsule 0     No current facility-administered medications on file prior to visit.      Social History     Tobacco Use    Smoking status: Never     Passive exposure: Never    Smokeless tobacco: Never   Vaping Use    Vaping status: Never Used   Substance and Sexual Activity    Alcohol use: Not Currently     Comment: socially none with pregnancy    Drug use: No     Comment: denies family use, denies self or FOB    Sexual activity: Yes     Partners: Male     Comment: Tru conceived while on depo , nuva ring , & OCP        Objective   There were no vitals taken for this visit.     Physical Exam  Psychiatric:         Attention and Perception: She does not perceive auditory or visual hallucinations.         Mood and Affect: Mood is anxious and depressed. Affect is tearful.         Thought Content: Thought content does not include homicidal or suicidal ideation. Thought content does not include homicidal or suicidal plan.      Comments: Patient was tearful occasionally throughout the exam. She was fidgeting with her hands . Patient was able to smile and laugh at appropriate times.        Martina Tijerina PA-C

## 2024-12-13 NOTE — LETTER
December 13, 2024     Patient: Susana Carl  YOB: 1995  Date of Visit: 12/13/2024      To Whom it May Concern:    Susana Carl is under my professional care. Susana was seen in my office on 12/13/2024. Susana may return to work on March 31, 2024 or sooner pending patient progress .    If you have any questions or concerns, please don't hesitate to call.         Sincerely,     Martina Tijerina PA-C        CC: No Recipients

## 2024-12-13 NOTE — TELEPHONE ENCOUNTER
This is NOT a duplicate. Patient misplaced her medication.     Reason for call:   [x] Refill   [] Prior Auth  [] Other:     Office:   [x] PCP/Provider -   [] Specialty/Provider -     Medication:   Cephalexin 500mg- take 1 capsule by mouth 3 times a day for 10 days      Pharmacy: [facility] [place]    Does the patient have enough for 3 days?   [] Yes   [x] No - Send as HP to POD

## 2024-12-13 NOTE — TELEPHONE ENCOUNTER
----- Message from Martina Tijerina PA-C sent at 12/13/2024 10:50 AM EST -----  Regarding: EPDS 19  Hello,    I wanted to inquire about additional resources for this patient. EPDS 19, she is on her second dose increase of zoloft 50mg to 100mg now. She has no interest in caring for baby and her other 2 children. Her mother who she lives with has been taking care of her children. She has no negative feelings towards the baby, declined SI/HI. I provided a referral to baby and me, online resources like [email or web address] and [email or web address]. Patient desires talk therapy. She has [employer].    Any input regarding additional resources/recommendations would be appreciated.    Thank you!  Martina Tijerina PA-C

## 2024-12-13 NOTE — TELEPHONE ENCOUNTER
Referral sent to  [facility] , sent a my chart message  message to the patient and called the patient and left   message to return the call , To see how she is doing and make her aware of referral that was placed , Referral was placed to [facility]

## 2024-12-13 NOTE — PATIENT INSTRUCTIONS
Find licensed [employer] Therapists in [place]   [place]  [facility] [phone]  [facility] Me referral

## 2024-12-15 ENCOUNTER — OFFICE VISIT (OUTPATIENT)
Dept: URGENT CARE | Facility: CLINIC | Age: 29
End: 2024-12-15
Payer: COMMERCIAL

## 2024-12-15 VITALS
DIASTOLIC BLOOD PRESSURE: 71 MMHG | RESPIRATION RATE: 18 BRPM | TEMPERATURE: 97.2 F | OXYGEN SATURATION: 97 % | SYSTOLIC BLOOD PRESSURE: 118 MMHG | HEART RATE: 60 BPM

## 2024-12-15 DIAGNOSIS — B37.9 YEAST INFECTION: Primary | ICD-10-CM

## 2024-12-15 PROCEDURE — 99213 OFFICE O/P EST LOW 20 MIN: CPT | Performed by: PHYSICIAN ASSISTANT

## 2024-12-15 RX ORDER — FLUCONAZOLE 150 MG/1
150 TABLET ORAL ONCE
Qty: 1 TABLET | Refills: 0 | Status: SHIPPED | OUTPATIENT
Start: 2024-12-15 | End: 2024-12-15
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
## 2024-12-15 NOTE — PATIENT INSTRUCTIONS
Can use Diflucan as prescribed.  Can use Monistat externally.  Follow-up with OB/GYN if symptoms persist.  If tests have been performed at [facility], our office will contact you with results if changes need to be made to the care plan discussed with you at the visit.  You can review your full results on [facility] MyChart  Follow up with PCP in 3-5 days.  Proceed to  ER if symptoms worsen.

## 2024-12-16 NOTE — TELEPHONE ENCOUNTER
Ms. Carl was scheduled for a Post Partum appointment on Friday, December 13, 2024@9:00am in [place] by IV[facility] Anaya.  Please advise.

## 2024-12-17 ENCOUNTER — PATIENT OUTREACH (OUTPATIENT)
Age: 29
End: 2024-12-17

## 2024-12-17 NOTE — PROGRESS NOTES
Received referral from Martina Tijerina PA-C requesting that [facility] outreach patient and assess for needs. Per chart review, patient delivered baby girl on 09/23/2024. Had PPD visit on 12/13 and had EPDS 19. Patients Zoloft was increased from 50mg to 100mg. Patients mother currently caring for baby and her other 2 children as well. Patient was advised to follow up with a therapist, referred to [facility], and encouraged to contact insurance for list of participating providers. Patient has MA [employer].    Attempted outreaching patient, no answer and left message for return call.

## 2024-12-26 ENCOUNTER — TELEPHONE (OUTPATIENT)
Dept: POSTPARTUM | Facility: CLINIC | Age: 29
End: 2024-12-26

## 2024-12-26 ENCOUNTER — PATIENT OUTREACH (OUTPATIENT)
Age: 29
End: 2024-12-26

## 2024-12-26 NOTE — TELEPHONE ENCOUNTER
Patient calling in  to schedule a therapy appointment. Advised patient that I would send a message and someone would be reaching out to schedule. Patient verbalized understanding.

## 2024-12-26 NOTE — LETTER
[address]  [facility] [address]  [phone]    Re: Unable to reach   12/26/2024       Dear Susana,    I am a [facility]  and wanted to be certain you had information to contact me should you desire assistance with or have questions about non-medical aspects of your care such as [but not limited to] medical insurance, housing, transportation, material needs, or emergency needs.  If I do not have an answer I will assist you in finding the appropriate agency or individual who can help.      Please feel free to contact me at Dept: [phone]. Thank You.    Sincerely,         Nathalia Yo

## 2024-12-26 NOTE — PROGRESS NOTES
2nd attempt outreaching patient to assess for needs. Per chart review, patient delivered baby girl on 09/23/2024. Had PPD visit on 12/13 and had EPDS 19. Patients Zoloft was increased from 50mg to 100mg. Patients mother currently caring for baby and her other 2 children as well. Patient was advised to follow up with a therapist, referred to [facility], and encouraged to contact insurance for list of participating providers. Patient has MA [employer]. No answer and left message. UTR letter was sent.

## 2024-12-30 ENCOUNTER — TELEPHONE (OUTPATIENT)
Age: 29
End: 2024-12-30

## 2024-12-30 NOTE — TELEPHONE ENCOUNTER
Spoke with Susana - we are unable to schedule therapy visit with [facility] therapists.  Patient resides in [facility] with MA plan. Not credentialed with the Behavioral Health portion of her MA plan.  Provided information on [facility] [employer] as well as recommended that she call her insurance for assistance in her area.

## 2024-12-30 NOTE — TELEPHONE ENCOUNTER
Contacted patient in regards to Routine Referral in attempts to verify patient's needs of services and add patient to proper wait list. LVM for patient to contact intake dept  in regards to referral.    Referral is for [facility], due to pts MA in chart pt can not be seen with [facility].   Upon call back please verify insurance and if pt has MA, add to proper wait list, if pt doesn't have MA refer to [facility].     2nd attempt, closing referral.

## 2024-12-30 NOTE — TELEPHONE ENCOUNTER
Patient returned call stating she is interested in talk therapy and has [employer]. Pt has been added to proper waitlist for talk therapy services. Closing referral.

## 2025-01-03 DIAGNOSIS — J30.89 ENVIRONMENTAL AND SEASONAL ALLERGIES: ICD-10-CM

## 2025-01-03 RX ORDER — CETIRIZINE HYDROCHLORIDE 10 MG/1
10 TABLET ORAL DAILY
Qty: 90 TABLET | Refills: 1 | Status: SHIPPED | OUTPATIENT
Start: 2025-01-03

## 2025-01-03 RX ORDER — FLUTICASONE PROPIONATE 50 MCG
2 SPRAY, SUSPENSION (ML) NASAL DAILY
Qty: 16 G | Refills: 3 | Status: SHIPPED | OUTPATIENT
Start: 2025-01-03

## 2025-01-03 NOTE — TELEPHONE ENCOUNTER
Patient called to request a refill on the following allergy medications: fluticasone (FLONASE) 50 mcg/act nasal spray       cetirizine (ZyrTEC) 10 mg tablet       [id number] - [place] - [address] [phone]

## 2025-01-04 ENCOUNTER — OFFICE VISIT (OUTPATIENT)
Dept: URGENT CARE | Facility: CLINIC | Age: 30
End: 2025-01-04
Payer: COMMERCIAL

## 2025-01-04 VITALS
WEIGHT: 168 LBS | BODY MASS INDEX: 25.54 KG/M2 | RESPIRATION RATE: 16 BRPM | DIASTOLIC BLOOD PRESSURE: 82 MMHG | HEART RATE: 106 BPM | TEMPERATURE: 98.2 F | SYSTOLIC BLOOD PRESSURE: 108 MMHG | OXYGEN SATURATION: 98 %

## 2025-01-04 DIAGNOSIS — R05.1 ACUTE COUGH: ICD-10-CM

## 2025-01-04 DIAGNOSIS — J01.00 ACUTE MAXILLARY SINUSITIS, RECURRENCE NOT SPECIFIED: Primary | ICD-10-CM

## 2025-01-04 PROCEDURE — 99213 OFFICE O/P EST LOW 20 MIN: CPT | Performed by: PHYSICIAN ASSISTANT

## 2025-01-04 RX ORDER — LEVOFLOXACIN 500 MG/1
500 TABLET, FILM COATED ORAL EVERY 24 HOURS
Qty: 7 TABLET | Refills: 0 | Status: SHIPPED | OUTPATIENT
Start: 2025-01-04 | End: 2025-01-11

## 2025-01-04 RX ORDER — BENZONATATE 100 MG/1
100 CAPSULE ORAL 3 TIMES DAILY PRN
Qty: 20 CAPSULE | Refills: 0 | Status: SHIPPED | OUTPATIENT
Start: 2025-01-04

## 2025-01-04 RX ORDER — ALBUTEROL SULFATE 90 UG/1
2 INHALANT RESPIRATORY (INHALATION) EVERY 6 HOURS PRN
Qty: 8.5 G | Refills: 0 | Status: SHIPPED | OUTPATIENT
Start: 2025-01-04

## 2025-01-04 NOTE — PATIENT INSTRUCTIONS
Recommended oral antibiotics for sinus infection.  Recommended albuterol inhaler and cough medication use as needed.    Follow up with PCP in 3-5 days.  Proceed to  ER if symptoms worsen.    If tests are performed, our office will contact you with results only if changes need to made to the care plan discussed with you at the visit. You can review your full results on [facility] My[facility].
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
## 2025-01-14 ENCOUNTER — NURSE TRIAGE (OUTPATIENT)
Age: 30
End: 2025-01-14

## 2025-01-14 NOTE — TELEPHONE ENCOUNTER
Patient called and spoke with PEP about possible Hungarian symtpoms, call dropped before transfer.   Phone call back to  patient and left M to call office.

## 2025-01-14 NOTE — TELEPHONE ENCOUNTER
"Answer Assessment - Initial Assessment Questions  Called patient back to discuss symptoms.  She is having thin white discharge with mild odor since 12/15/24.  She initially had gone to urgent care at the onset and got treatment for a Czech with 1 dose of Diflucan.  Patient reports symptoms improved, but did not ever resolve.  She also notes some itchiness and redness on the outside. She denies vaginal bleeding and abdominal pain. Suggested mild unscented soaps for cleansing, cotton underwear and keeping the area clean and dry.  Please advise for further recommendations.           1. DISCHARGE: \"Describe the discharge.\" (e.g., white, yellow, green, gray, foamy, cottage cheese-like)      Thin white  2. ODOR: \"Is there a bad odor?\"      denies  3. ONSET: \"When did the discharge begin?\"      2 weeks approx since she went discharge  4. RASH: \"Is there a rash in the genital area?\" If Yes, ask: \"Describe it.\" (e.g., redness, blisters, sores, bumps)      redness  5. ABDOMEN PAIN: \"Are you having any abdomen pain?\" If Yes, ask: \"What does it feel like? \" (e.g., crampy, dull, intermittent, constant)       denies  6. ABDOMEN PAIN SEVERITY: If present, ask: \"How bad is it?\" (e.g., Scale 1-10; mild, moderate, or severe)      denies  7. CAUSE: \"What do you think is causing the discharge?\" \"Have you had the same problem before?\" \"What happened then?\"       8. OTHER SYMPTOMS: \"Do you have any other symptoms?\" (e.g., fever, itching, vaginal bleeding, pain with urination, injury to genital area, vaginal foreign body)      Denies, denies vb  9. PREGNANCY: \"Is there any chance you are pregnant?\" \"When was your last menstrual period?\"      Postpartum and breastfeeding    Protocols used: Vaginal Discharge-Adult-[place]    "

## 2025-01-14 NOTE — TELEPHONE ENCOUNTER
Pt returning nurses call to be triage for Greenlandic. No answer from CTS. Pt made aware of message sent.

## 2025-01-15 NOTE — TELEPHONE ENCOUNTER
Patient returned call. Advised patient per provider, she would need to be seen office. Attempted to schedule appointment , no available appointments in an appropriate time frame. Warm transfer to Jennifer in office at this time. Advised patient to call back with worsening symptoms.

## 2025-01-15 NOTE — TELEPHONE ENCOUNTER
Per provider, pt should be further evaluated in the office. Needs appointment. RN placed a call. No answer. LVM requesting a return call.

## 2025-01-20 NOTE — PROGRESS NOTES
"Name: Susana Carl      : 1995      MRN: [id number]  Encounter Provider: JONAS Hebert  Encounter Date: 2025   Encounter department: [facility] [place]    :  Assessment & Plan  Vaginal odor  Pt with itching and malodor, exam and wet mount more consistent with BV. No yeast on wet mount.  Tx with flagyl and topical mycolog given for external symptoms   Orders:  •  POCT wet mount  •  nystatin-triamcinolone (MYCOLOG-II) ointment; Apply topically 2 (two) times a day  •  metroNIDAZOLE (FLAGYL) 500 mg tablet; Take 1 tablet (500 mg total) by mouth 2 (two) times a day for 7 days    Screen for STD (sexually transmitted disease)  New partner- testing ordered  Orders:  •  Chlamydia/GC amplified DNA by PCR             Subjective:   History of Present Illness     Susana Carl is a 29 y.o. female.She is here for vaginal discomfort    Thin white d/c with intermittent  malodor for the past month. No malodor noted today, but is also spotting a little. (Breastfeeding and on POP)   Intermittent external itchiness.     Was previously Treated at urgent care 12/15 w// diflucan for presumed yeast  when symptoms first started.     Denies urinary c/o  Does have a newish partner    Menstrual History:  No LMP recorded.          Review of Systems  The following portions of the patient's history were reviewed and updated as appropriate: allergies, current medications, past family history, past medical history, past social history, past surgical history, and problem list.          Objective:   Objective   /74 (BP Location: Left arm, Patient Position: Sitting, Cuff Size: Standard)   Ht 5' 8\" (1.727 m)   Wt 76.7 kg (169 lb)   Breastfeeding Yes   BMI 25.70 kg/m²     Physical Exam  Constitutional:       General: She is not in acute distress.     Appearance: Normal appearance.   Genitourinary:      Urethral meatus normal.      No lesions in the vagina.      Right Labia: No " rash, lesions or skin changes.     Left Labia: No lesions, skin changes or rash.     Vulva exam comments: Subtle erythema medial labia  .      Vaginal discharge (small amt mixed with scan blood, -brown. + malodor) present.      No vaginal erythema, tenderness, bleeding or ulceration.        Right Adnexa: not tender, not full and no mass present.     Left Adnexa: not tender, not full and no mass present.     No cervical motion tenderness, discharge, friability or lesion.      Uterus is not enlarged or tender.      Bladder is not tender.       Pelvic exam was performed with patient in the lithotomy position.   Rectum:      No external hemorrhoid.   HENT:      Head: Normocephalic.   Cardiovascular:      Rate and Rhythm: Normal rate.   Pulmonary:      Effort: Pulmonary effort is normal.   Neurological:      Mental Status: She is alert and oriented to person, place, and time.   Psychiatric:         Mood and Affect: Mood normal.         Behavior: Behavior normal.   Vitals reviewed.

## 2025-01-22 ENCOUNTER — OFFICE VISIT (OUTPATIENT)
Dept: OBGYN CLINIC | Facility: MEDICAL CENTER | Age: 30
End: 2025-01-22
Payer: COMMERCIAL

## 2025-01-22 VITALS
BODY MASS INDEX: 25.61 KG/M2 | DIASTOLIC BLOOD PRESSURE: 74 MMHG | SYSTOLIC BLOOD PRESSURE: 128 MMHG | WEIGHT: 169 LBS | HEIGHT: 68 IN

## 2025-01-22 DIAGNOSIS — N89.8 VAGINAL ODOR: Primary | ICD-10-CM

## 2025-01-22 DIAGNOSIS — Z11.3 SCREEN FOR STD (SEXUALLY TRANSMITTED DISEASE): ICD-10-CM

## 2025-01-22 LAB
BV WHIFF TEST VAG QL: ABNORMAL
CLUE CELLS SPEC QL WET PREP: ABNORMAL
PH SMN: 5 [PH]
SL AMB POCT WET MOUNT: ABNORMAL
T VAGINALIS VAG QL WET PREP: ABNORMAL
YEAST VAG QL WET PREP: ABNORMAL

## 2025-01-22 PROCEDURE — 99213 OFFICE O/P EST LOW 20 MIN: CPT | Performed by: CLINICAL NURSE SPECIALIST

## 2025-01-22 PROCEDURE — 87491 CHLMYD TRACH DNA AMP PROBE: CPT | Performed by: CLINICAL NURSE SPECIALIST

## 2025-01-22 PROCEDURE — 87591 N.GONORRHOEAE DNA AMP PROB: CPT | Performed by: CLINICAL NURSE SPECIALIST

## 2025-01-22 PROCEDURE — 87210 SMEAR WET MOUNT SALINE/INK: CPT | Performed by: CLINICAL NURSE SPECIALIST

## 2025-01-22 RX ORDER — METRONIDAZOLE 500 MG/1
500 TABLET ORAL 2 TIMES DAILY
Qty: 14 TABLET | Refills: 0 | Status: SHIPPED | OUTPATIENT
Start: 2025-01-22 | End: 2025-01-29

## 2025-01-22 RX ORDER — NYSTATIN AND TRIAMCINOLONE ACETONIDE 100000; 1 [USP'U]/G; MG/G
OINTMENT TOPICAL 2 TIMES DAILY
Qty: 15 G | Refills: 0 | Status: SHIPPED | OUTPATIENT
Start: 2025-01-22

## 2025-01-23 ENCOUNTER — RESULTS FOLLOW-UP (OUTPATIENT)
Dept: OBGYN CLINIC | Facility: MEDICAL CENTER | Age: 30
End: 2025-01-23

## 2025-01-23 LAB
C TRACH DNA SPEC QL NAA+PROBE: NEGATIVE
N GONORRHOEA DNA SPEC QL NAA+PROBE: NEGATIVE

## 2025-02-02 DIAGNOSIS — J30.89 ENVIRONMENTAL AND SEASONAL ALLERGIES: ICD-10-CM

## 2025-02-03 RX ORDER — FLUTICASONE PROPIONATE 50 MCG
2 SPRAY, SUSPENSION (ML) NASAL DAILY
Qty: 48 ML | Refills: 1 | Status: SHIPPED | OUTPATIENT
Start: 2025-02-03

## 2025-02-05 ENCOUNTER — OFFICE VISIT (OUTPATIENT)
Dept: FAMILY MEDICINE CLINIC | Facility: CLINIC | Age: 30
End: 2025-02-05
Payer: COMMERCIAL

## 2025-02-05 VITALS
BODY MASS INDEX: 25.43 KG/M2 | OXYGEN SATURATION: 98 % | WEIGHT: 167.8 LBS | HEIGHT: 68 IN | SYSTOLIC BLOOD PRESSURE: 120 MMHG | HEART RATE: 70 BPM | DIASTOLIC BLOOD PRESSURE: 70 MMHG

## 2025-02-05 DIAGNOSIS — L30.9 ECZEMA, UNSPECIFIED TYPE: Primary | ICD-10-CM

## 2025-02-05 PROCEDURE — 99213 OFFICE O/P EST LOW 20 MIN: CPT | Performed by: NURSE PRACTITIONER

## 2025-02-05 RX ORDER — TRIAMCINOLONE ACETONIDE 1 MG/G
CREAM TOPICAL 2 TIMES DAILY
Qty: 30 G | Refills: 1 | Status: SHIPPED | OUTPATIENT
Start: 2025-02-05

## 2025-02-05 RX ORDER — FLUCONAZOLE 150 MG/1
1 TABLET ORAL DAILY
COMMUNITY
Start: 2024-12-15 | End: 2025-02-05 | Stop reason: ALTCHOICE

## 2025-02-05 NOTE — ASSESSMENT & PLAN NOTE
Rash has since resolved.  Discussed with patient importance of using tepid water, or limit hot showers.  To pat skin dry after showering.  Discussed use of avoidance such as CeraVe, Aquaphor, Aveeno to skin after showering.  Triamcinolone cream as needed for flareups.    Orders:    triamcinolone (KENALOG) 0.1 % cream; Apply topically 2 (two) times a day

## 2025-02-05 NOTE — PROGRESS NOTES
Name: Susana Carl      : 1995      MRN: [id number]  Encounter Provider: JONAS Fernandez  Encounter Date: 2025   Encounter department: [facility] [address]  :  Assessment & Plan  Eczema, unspecified type  Rash has since resolved.  Discussed with patient importance of using tepid water, or limit hot showers.  To pat skin dry after showering.  Discussed use of avoidance such as CeraVe, Aquaphor, Aveeno to skin after showering.  Triamcinolone cream as needed for flareups.    Orders:    triamcinolone (KENALOG) 0.1 % cream; Apply topically 2 (two) times a day           History of Present Illness   Patient presents office for evaluation of rash.    Per patient, symptoms started 2 weeks ago.  Rash began to right side of neck, then went up to right side of lower face.  Patient notes pruritus, redness.  History of eczema, but has not been on medication for it.  Began using over-the-counter lotion for symptoms and rash began to improve.  Patient denies new foods, detergents, lotions, medications.  Denies wheezing, sensation of throat closing up, hives.      Review of Systems   Constitutional:  Negative for activity change, appetite change, chills and fever.   HENT:  Negative for congestion, ear pain, rhinorrhea, sneezing, sore throat and trouble swallowing.    Eyes:  Negative for photophobia and visual disturbance.   Respiratory:  Negative for cough, chest tightness, shortness of breath and wheezing.    Cardiovascular:  Negative for chest pain and palpitations.   Gastrointestinal:  Negative for abdominal pain, nausea and vomiting.   Genitourinary:  Negative for decreased urine volume.   Musculoskeletal:  Negative for arthralgias and myalgias.   Skin:  Positive for rash. Negative for color change.   Neurological:  Negative for dizziness, light-headedness and headaches.   Hematological:  Negative for adenopathy.   Psychiatric/Behavioral:  Negative for confusion.   "      Objective   /70 (BP Location: Left arm, Patient Position: Sitting, Cuff Size: Standard)   Pulse 70   Ht 5' 8\" (1.727 m)   Wt 76.1 kg (167 lb 12.8 oz)   SpO2 98%   BMI 25.51 kg/m²      Physical Exam  Vitals reviewed.   Constitutional:       General: She is not in acute distress.     Appearance: Normal appearance. She is not ill-appearing.   HENT:      Head: Normocephalic and atraumatic.      Right Ear: Tympanic membrane, ear canal and external ear normal.      Left Ear: Tympanic membrane, ear canal and external ear normal.      Nose: Nose normal. No congestion or rhinorrhea.      Mouth/Throat:      Mouth: Mucous membranes are moist.      Pharynx: Oropharynx is clear.   Eyes:      Pupils: Pupils are equal, round, and reactive to light.   Cardiovascular:      Rate and Rhythm: Normal rate and regular rhythm.      Pulses: Normal pulses.      Heart sounds: Normal heart sounds. No murmur heard.  Pulmonary:      Effort: Pulmonary effort is normal.      Breath sounds: Normal breath sounds. No wheezing.   Musculoskeletal:         General: Normal range of motion.      Cervical back: Normal range of motion and neck supple.   Skin:     General: Skin is warm and dry.      Findings: Rash (healing rash noted to right lateral neck) present.   Neurological:      General: No focal deficit present.      Mental Status: She is alert and oriented to person, place, and time.   Psychiatric:         Mood and Affect: Mood normal.         Behavior: Behavior normal.         "

## 2025-03-07 ENCOUNTER — TELEPHONE (OUTPATIENT)
Age: 30
End: 2025-03-07

## 2025-03-07 NOTE — TELEPHONE ENCOUNTER
Patient on wait list for talk therapy services. Writer reached out to verify if Pt is still interested in service, and if would like to remain on [facility]. No answer, lvm for a callback at [phone].    [place] verified in [employer]:    Recipient ID: [id number]     [facility]

## 2025-03-07 NOTE — TELEPHONE ENCOUNTER
Patient returned call as is interested in therapy. Writer verified information and pt shared would prefer what ever is closest to location ([place]) and pt does not have an provider preference. Pt understood will be contacted upon availability for scheduling.

## 2025-03-24 DIAGNOSIS — F32.89 OTHER DEPRESSION: ICD-10-CM

## 2025-03-26 RX ORDER — SERTRALINE HYDROCHLORIDE 100 MG/1
100 TABLET, FILM COATED ORAL DAILY
Qty: 30 TABLET | Refills: 3 | Status: SHIPPED | OUTPATIENT
Start: 2025-03-26

## 2025-03-26 NOTE — TELEPHONE ENCOUNTER
Refill provided. Made patient aware to take only 1 pill instead of 2 as I have changed the dose of the pill to be 100mg instead of 50mg

## 2025-04-22 DIAGNOSIS — F32.89 OTHER DEPRESSION: ICD-10-CM

## 2025-04-22 NOTE — TELEPHONE ENCOUNTER
Medication: sertraline (ZOLOFT) 100 mg tablet     Dose/Frequency:   Take 1 tablet (100 mg total) by mouth daily        Quantity: 30 tablet     Pharmacy: [id number] - [place] [address]     Office:   [] PCP/Provider - Martina Tijerina PA-C   [] Speciality/Provider -     Does the patient have enough for 3 days?   [x] Yes   [] No - Send as HP to POD

## 2025-04-23 RX ORDER — SERTRALINE HYDROCHLORIDE 100 MG/1
100 TABLET, FILM COATED ORAL DAILY
Qty: 30 TABLET | Refills: 3 | Status: SHIPPED | OUTPATIENT
Start: 2025-04-23

## 2025-04-23 NOTE — TELEPHONE ENCOUNTER
Spoke with patient and advised her to schedule an appt with PCP for any further refills. Patient verbalized understanding. Please advise if courtesy refill can be sent until patient has an appt with her PCP.

## 2025-05-05 ENCOUNTER — TELEPHONE (OUTPATIENT)
Age: 30
End: 2025-05-05

## 2025-05-05 NOTE — TELEPHONE ENCOUNTER
Patient would like a lab order to test for TB.  Please contact her to let her know when it is in for her.  Thank you.

## 2025-05-06 DIAGNOSIS — Z11.1 TUBERCULOSIS SCREENING: Primary | ICD-10-CM

## 2025-05-07 ENCOUNTER — NURSE TRIAGE (OUTPATIENT)
Age: 30
End: 2025-05-07

## 2025-05-07 DIAGNOSIS — R30.0 BURNING WITH URINATION: Primary | ICD-10-CM

## 2025-05-07 NOTE — TELEPHONE ENCOUNTER
"FOLLOW UP: n/a    REASON FOR CONVERSATION: Possible UTI    SYMPTOMS: urgency, burning with urination and cloudy urine    OTHER: Symptoms began yesterday. Ordered urine culture and UA to be done. Reviewed increased hydration, tylenol/motrin for pelvic pain , AZO an avoidance of bladder irritants. Patient verbalized understanding.     DISPOSITION: Order Lab Work (overriding See Today in Office)  Reason for Disposition   Urinating more frequently than usual (i.e., frequency) OR new-onset of the feeling of an urgent need to urinate (i.e., urgency)    Answer Assessment - Initial Assessment Questions  1. SYMPTOM: \"What's the main symptom you're concerned about?\" (e.g., frequency, incontinence)      Urgency, burning with urination and cloudy urine  2. ONSET: \"When did the  symptoms  start?\"      yesterday  3. PAIN: \"Is there any pain?\" If Yes, ask: \"How bad is it?\" (Scale: 1-10; mild, moderate, severe)      Pelvis 8/10  4. CAUSE: \"What do you think is causing the symptoms?\"      Possible UTI--feels similar  5. OTHER SYMPTOMS: \"Do you have any other symptoms?\" (e.g., blood in urine, fever, flank pain, pain with urination)      denies  6. PREGNANCY: \"Is there any chance you are pregnant?\" \"When was your last menstrual period?\"      4/13    Protocols used: Urinary Symptoms-Adult-[place]    "

## 2025-05-08 ENCOUNTER — APPOINTMENT (OUTPATIENT)
Age: 30
End: 2025-05-08
Payer: COMMERCIAL

## 2025-05-08 DIAGNOSIS — Z11.1 TUBERCULOSIS SCREENING: ICD-10-CM

## 2025-05-08 DIAGNOSIS — R30.0 BURNING WITH URINATION: ICD-10-CM

## 2025-05-08 PROCEDURE — 86480 TB TEST CELL IMMUN MEASURE: CPT

## 2025-05-08 PROCEDURE — 81001 URINALYSIS AUTO W/SCOPE: CPT

## 2025-05-08 PROCEDURE — 36415 COLL VENOUS BLD VENIPUNCTURE: CPT

## 2025-05-09 ENCOUNTER — RESULTS FOLLOW-UP (OUTPATIENT)
Dept: OBGYN CLINIC | Facility: MEDICAL CENTER | Age: 30
End: 2025-05-09

## 2025-05-09 LAB
GAMMA INTERFERON BACKGROUND BLD IA-ACNC: 0.05 IU/ML
M TB IFN-G BLD-IMP: NEGATIVE
M TB IFN-G CD4+ BCKGRND COR BLD-ACNC: 0.01 IU/ML
M TB IFN-G CD4+ BCKGRND COR BLD-ACNC: 0.02 IU/ML
MITOGEN IGNF BCKGRD COR BLD-ACNC: 9.95 IU/ML

## 2025-05-09 NOTE — RESULT ENCOUNTER NOTE
UA mildly abnormal. UC was also supposed to be collected but may have been missed as I don't see this in process. Please f/u with lab/pt.

## 2025-05-14 ENCOUNTER — TRANSCRIBE ORDERS (OUTPATIENT)
Dept: LAB | Facility: CLINIC | Age: 30
End: 2025-05-14

## 2025-05-14 DIAGNOSIS — R30.0 BURNING WITH URINATION: Primary | ICD-10-CM

## 2025-05-14 NOTE — TELEPHONE ENCOUNTER
----- Message from JONAS Darnell sent at 5/9/2025  7:54 AM EDT -----  UA mildly abnormal. UC was also supposed to be collected but may have been missed as I don't see this in process. Please f/u with lab/pt.

## 2025-05-19 ENCOUNTER — TELEPHONE (OUTPATIENT)
Age: 30
End: 2025-05-19

## 2025-05-19 NOTE — TELEPHONE ENCOUNTER
----- Message from JONAS Darnell sent at 5/19/2025  7:00 AM EDT -----  I don't see that pt did UC. Can you please f/u with pt to see if symptoms resolved?    ----- Message -----  From: Massiel Francois MA  Sent: 5/14/2025   2:47 PM EDT  To: JONAS Darnell    Spoke with lab and the urine culture was never collected. The lab will call patient to recollect a Urine culture. FYI.   ----- Message -----  From: JONAS Darnell  Sent: 5/9/2025   7:54 AM EDT  To: [facility]    UA mildly abnormal. UC was also supposed to be collected but may have been missed as I don't see this in process. Please f/u with lab/pt.

## 2025-05-19 NOTE — TELEPHONE ENCOUNTER
Spoke to Patient, Susana haven't  had the time to go to the lab to repeat UC, Patient still having burning with urination and pelvic pain. Please advise. Thanks

## 2025-05-19 NOTE — TELEPHONE ENCOUNTER
Patient states she is no longer breast feeding, therefore she requests her hydrOXYzine HCL (ATARAX) 25 mg tablet be refilled. She would like it sent to [id number] - [place] - [address].

## 2025-05-20 DIAGNOSIS — J30.89 ENVIRONMENTAL AND SEASONAL ALLERGIES: Primary | ICD-10-CM

## 2025-05-20 DIAGNOSIS — L30.9 ECZEMA, UNSPECIFIED TYPE: ICD-10-CM

## 2025-05-20 RX ORDER — HYDROXYZINE HYDROCHLORIDE 25 MG/1
25 TABLET, FILM COATED ORAL EVERY 6 HOURS PRN
Qty: 30 TABLET | Refills: 3 | Status: SHIPPED | OUTPATIENT
Start: 2025-05-20

## 2025-05-20 NOTE — TELEPHONE ENCOUNTER
Called patient and she is said you told her to leet you know when she stops breast feeding so you can refill for her she is on this for Pruritus

## 2025-05-20 NOTE — TELEPHONE ENCOUNTER
Please inquire from patient what medication was being used for?  It is currently not on her active medication list.  Previously she was using it for pruritus.

## 2025-05-20 NOTE — TELEPHONE ENCOUNTER
Spoke to patient and advised her it is very important for her to get the urine culture repeated as it can be dangerous in pregnancy. Patient verbalized understanding and said she will get the rest done ASAP. I double checked and made sure order for urine culture still available.

## 2025-06-24 ENCOUNTER — TELEPHONE (OUTPATIENT)
Age: 30
End: 2025-06-24

## 2025-06-24 DIAGNOSIS — Z30.011 ENCOUNTER FOR INITIAL PRESCRIPTION OF CONTRACEPTIVE PILLS: Primary | ICD-10-CM

## 2025-06-24 NOTE — TELEPHONE ENCOUNTER
Patient called in to request her birth control pill be switched to a formula that would allow her to skip her periods. Patient currently taking kodi as she was breastfeeding, but has stopped breastfeeding and would like to switch pills. Advised will review with provider for recommendations. Confirmed [facility] pharmacy in [place] on file.

## 2025-06-25 RX ORDER — NORGESTIMATE AND ETHINYL ESTRADIOL 0.25-0.035
1 KIT ORAL DAILY
Qty: 112 TABLET | Refills: 4 | Status: SHIPPED | OUTPATIENT
Start: 2025-06-25

## 2025-06-25 NOTE — TELEPHONE ENCOUNTER
Spoke with patient regarding provider note. She verbalized understanding, no further questions or concerns at this time.

## 2025-06-25 NOTE — TELEPHONE ENCOUNTER
Chart reviewed. No contraindication for CLARE  Ortho cyclen given. Skip placebo wk to suppress menses,  brst tenderness, nausea and BTB common in 1st 3 months and will resolve.   With continuous dosing- BTB is not unusual as a side effect. Often can be resolved by allowing a withdrawal bleed.
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
## 2025-07-09 ENCOUNTER — OFFICE VISIT (OUTPATIENT)
Dept: OBGYN CLINIC | Facility: CLINIC | Age: 30
End: 2025-07-09
Payer: COMMERCIAL

## 2025-07-09 VITALS — BODY MASS INDEX: 24.33 KG/M2 | WEIGHT: 160 LBS

## 2025-07-09 DIAGNOSIS — Z11.3 SCREENING FOR STDS (SEXUALLY TRANSMITTED DISEASES): ICD-10-CM

## 2025-07-09 DIAGNOSIS — R30.0 DYSURIA: Primary | ICD-10-CM

## 2025-07-09 LAB
SL AMB  POCT GLUCOSE, UA: ABNORMAL
SL AMB LEUKOCYTE ESTERASE,UA: ABNORMAL
SL AMB POCT BILIRUBIN,UA: ABNORMAL
SL AMB POCT BLOOD,UA: ABNORMAL
SL AMB POCT KETONES,UA: ABNORMAL
SL AMB POCT NITRITE,UA: ABNORMAL
SL AMB POCT PH,UA: 6
SL AMB POCT SPECIFIC GRAVITY,UA: 1
SL AMB POCT URINE PROTEIN: ABNORMAL
SL AMB POCT UROBILINOGEN: 0.2

## 2025-07-09 PROCEDURE — 81002 URINALYSIS NONAUTO W/O SCOPE: CPT

## 2025-07-09 PROCEDURE — 87591 N.GONORRHOEAE DNA AMP PROB: CPT

## 2025-07-09 PROCEDURE — 87086 URINE CULTURE/COLONY COUNT: CPT

## 2025-07-09 PROCEDURE — 87186 SC STD MICRODIL/AGAR DIL: CPT

## 2025-07-09 PROCEDURE — 87077 CULTURE AEROBIC IDENTIFY: CPT

## 2025-07-09 PROCEDURE — 99213 OFFICE O/P EST LOW 20 MIN: CPT

## 2025-07-09 PROCEDURE — 87491 CHLMYD TRACH DNA AMP PROBE: CPT

## 2025-07-09 RX ORDER — NITROFURANTOIN 25; 75 MG/1; MG/1
100 CAPSULE ORAL 2 TIMES DAILY
Qty: 10 CAPSULE | Refills: 0 | Status: SHIPPED | OUTPATIENT
Start: 2025-07-09 | End: 2025-07-14

## 2025-07-23 ENCOUNTER — NURSE TRIAGE (OUTPATIENT)
Age: 30
End: 2025-07-23

## 2025-07-23 ENCOUNTER — OFFICE VISIT (OUTPATIENT)
Dept: OBGYN CLINIC | Facility: CLINIC | Age: 30
End: 2025-07-23
Payer: COMMERCIAL

## 2025-07-23 VITALS — SYSTOLIC BLOOD PRESSURE: 120 MMHG | BODY MASS INDEX: 24.33 KG/M2 | DIASTOLIC BLOOD PRESSURE: 80 MMHG | WEIGHT: 160 LBS

## 2025-07-23 DIAGNOSIS — B37.49 CANDIDA INFECTION OF GENITAL REGION: Primary | ICD-10-CM

## 2025-07-23 PROBLEM — Z30.011 ENCOUNTER FOR INITIAL PRESCRIPTION OF CONTRACEPTIVE PILLS: Status: RESOLVED | Noted: 2024-10-28 | Resolved: 2025-07-23

## 2025-07-23 PROCEDURE — 99213 OFFICE O/P EST LOW 20 MIN: CPT | Performed by: NURSE PRACTITIONER

## 2025-07-23 RX ORDER — MICONAZOLE NITRATE 2 %
1 CREAM WITH APPLICATOR VAGINAL
Qty: 45 G | Refills: 1 | Status: SHIPPED | OUTPATIENT
Start: 2025-07-23

## 2025-07-23 NOTE — PROGRESS NOTES
Name: Susana Carl      : 1995      MRN: [id number]  Encounter Provider: JONAS Stafford  Encounter Date: 2025   Encounter department: [facility]  :  Assessment & Plan  Candida infection of genital region  Advised to call if no symptom relief after treatment  Orders:    miconazole (MONISTAT-7) 2 % vaginal cream; Insert 1 applicator into the vagina daily at bedtime        History of Present Illness   HPI  Susana Carl is a 30 y.o. female who presents for vaginal discharge and itching since completing her treatment for UTI in early July.  She has not used anything on her own except for kenalog prn.  She is sexually active and denies any new partners.  She reports regular monthly cycles.    Burning, itching, rash and vaginal discharge.   Recent UTI treatment, pt thinks she has a Polish now.     Has recent STD testing on file.    EPDS 3      Review of Systems   Constitutional:  Negative for chills, fatigue, fever and unexpected weight change.   Respiratory:  Negative for shortness of breath.    Gastrointestinal:  Negative for anal bleeding, blood in stool, constipation and diarrhea.   Genitourinary:  Positive for vaginal discharge. Negative for difficulty urinating, dysuria and hematuria.   Neurological:  Negative for weakness.   Psychiatric/Behavioral:  Negative for agitation, behavioral problems and confusion.           Objective   /80 (BP Location: Left arm, Patient Position: Sitting, Cuff Size: Adult)   Wt 72.6 kg (160 lb)   LMP 2025 (Exact Date)   Breastfeeding Yes   BMI 24.33 kg/m²      Physical Exam  Constitutional:       General: She is not in acute distress.     Appearance: She is well-developed. She is not diaphoretic.   Pulmonary:      Effort: Pulmonary effort is normal.   Abdominal:      Palpations: Abdomen is soft.      Hernia: There is no hernia in the left inguinal area.   Genitourinary:     Labia:         Right: No rash,  tenderness, lesion or injury.         Left: No rash, tenderness, lesion or injury.       Vagina: No signs of injury and foreign body. Vaginal discharge present. No erythema, tenderness or bleeding.      Cervix: No cervical motion tenderness or friability.      Uterus: Not deviated, not enlarged, not fixed and not tender.       Adnexa:         Right: No mass, tenderness or fullness.          Left: No mass, tenderness or fullness.        Comments: Moderate amount of yeasty, curd-like white discharge noted      Skin:     General: Skin is warm and dry.     Psychiatric:         Behavior: Behavior is cooperative.

## 2025-07-23 NOTE — TELEPHONE ENCOUNTER
"REASON FOR CONVERSATION: Vaginitis    SYMPTOMS: vaginal discharge, itching, burning, rash    OTHER HEALTH INFORMATION: Pt calling in saying she's been treated for a UTI recently and pt now thinks she has a yeast infection. Pt has white discharge, and itching and burning outside of the vagina. Pt c/o having a rash in the genital area.     PROTOCOL DISPOSITION: See Today or Tomorrow in Office    CARE ADVICE PROVIDED: Pt is provided care advice, and is notified when to call back, pt verbalized understanding.      PRACTICE FOLLOW-UP:   Attempted to schedule an appt, Attempted to contact the office and was unsuccessful, pt was notified that a message will be sent, and she will be receiving a call back with an appt date and time, pt verbalized understanding.            Reason for Disposition   Rash (e.g., redness, tiny bumps, sore) of genital area present > 24 hours    Answer Assessment - Initial Assessment Questions  1. DISCHARGE: \"Describe the discharge.\" (e.g., white, yellow, green, gray, foamy, cottage cheese-like)      White discharge   2. ODOR: \"Is there a bad odor?\"      Pt denies   3. ONSET: \"When did the discharge begin?\"      2 days ago   4. RASH: \"Is there a rash in the genital area?\" If Yes, ask: \"Describe it.\" (e.g., redness, blisters, sores, bumps)      Pt reporting having a rash in the genital area   5. ABDOMEN PAIN: \"Are you having any abdomen pain?\" If Yes, ask: \"What does it feel like? \" (e.g., crampy, dull, intermittent, constant)       Pt denies   6. ABDOMEN PAIN SEVERITY: If present, ask: \"How bad is it?\" (e.g., Scale 1-10; mild, moderate, or severe)      Pt denies   7. CAUSE: \"What do you think is causing the discharge?\" \"Have you had the same problem before?\" \"What happened then?\"      Pt thinks yeast infection   8. OTHER SYMPTOMS: \"Do you have any other symptoms?\" (e.g., fever, itching, vaginal bleeding, pain with urination, injury to genital area)  Pt denies fever,vaginal bleeding, pain with " "urination, injury to genital area          9. PREGNANCY: \"Is there any chance you are pregnant?\" \"When was your last menstrual period?\"      Pt denies pregnancy, LMP 6/22/25.    Protocols used: Vaginal Discharge-Adult-[place]    "

## 2025-07-23 NOTE — PATIENT INSTRUCTIONS
"Patient Education     Vulvovaginal yeast infection   The Basics   Written by the doctors and editors at [facility]   What is a vulvovaginal yeast infection? -- This is an infection that causes itching and irritation of the vulva, the outer lips of the vagina (figure 1). The infection is usually caused by a fungus called \"Candida.\" (Yeast are a type of fungus.)  What causes yeast infections? -- The fungus that causes yeast infections normally lives in the vagina and the gut. Even though the yeast are there, they do not usually cause symptoms. Certain medicines (especially antibiotics), stress, and other things can cause the fungus to grow more than it should. When that happens, a yeast infection can start.  Your risk of getting a yeast infection is higher if you:   Have diabetes   Are pregnant   Have a weaker-than-normal immune system  Yeast infections are not usually spread through sex.  What are the symptoms of a yeast infection? -- Symptoms include:   Itching of the vulva (this is the most common symptom)   Pain, redness, or irritation of the vulva and vagina   Pain when you urinate   Pain during sex   Abnormal vaginal discharge, which might be thick and white or thin and watery  The symptoms of a yeast infection are a lot like the symptoms of many other conditions. The best way to find out what is causing your symptoms is to see your doctor or nurse. This way, you can get the right treatment.  Is there a test for yeast infection? -- Yes. Your doctor or nurse will use a swab to get a sample of fluid from your vagina. Then, they will put it under a microscope and look for the fungus that causes yeast infections. Sometimes, they might do a test to find out which type of yeast you have.  Depending on your situation, your doctor or nurse might do other tests on your vaginal fluid, too. One common test checks for yeast infections as well as bacterial vaginosis and trichomoniasis. These are other infections that can also " cause itching and irritation.  How are yeast infections treated? -- Yeast infections are treated with medicines. All medicines for yeast infections work by killing the fungus that causes the infections. They come in different forms:   A pill you take by mouth   Medicines you put in your vagina and on your vulva - These come as creams or tablets. This is the preferred treatment for pregnant people.  When will I feel better? -- You will probably feel better within a few days of starting treatment. If you do not get better after you finish treatment, see your doctor or nurse again. You might need to take more medicine or a different medicine.  What if I get yeast infections often? -- Tell your doctor or nurse. They can help find out whether your symptoms are caused by a yeast infection and, if so, which type of yeast. There are a few different types of yeast, and they respond to different treatments. Plus, the same symptoms that you get with a yeast infection can sometimes be caused by other types of infections, an allergy, or other problems. If you get frequent infections, you might need a different treatment than what you tried in the past.  When should I call the doctor? -- Call your doctor or nurse for advice if your symptoms do not get better after treatment. It might take up to a week for symptoms to improve.  All topics are updated as new evidence becomes available and our peer review process is complete.  This topic retrieved from [employer] on: Feb 26, 2024.  Topic 87277 Version 14.0  Release: 32.2.4 - C32.56  © 2024 UpToDate, Inc. and/or its affiliates. All rights reserved.  figure 1: Adult female external genitalia     This drawing shows the different parts of the genitals.  Graphic 58471 Version 9.0  Consumer Information Use and Disclaimer   Disclaimer: This generalized information is a limited summary of diagnosis, treatment, and/or medication information. It is not meant to be comprehensive and should be  used as a tool to help the user understand and/or assess potential diagnostic and treatment options. It does NOT include all information about conditions, treatments, medications, side effects, or risks that may apply to a specific patient. It is not intended to be medical advice or a substitute for the medical advice, diagnosis, or treatment of a health care provider based on the health care provider's examination and assessment of a patient's specific and unique circumstances. Patients must speak with a health care provider for complete information about their health, medical questions, and treatment options, including any risks or benefits regarding use of medications. This information does not endorse any treatments or medications as safe, effective, or approved for treating a specific patient. UpToDate, Inc. and its affiliates disclaim any warranty or liability relating to this information or the use thereof.The use of this information is governed by the Terms of Use, available at https://www.[employer].com/en/know/clinical-effectiveness-terms. 2024© UpToDate, Inc. and its affiliates and/or licensors. All rights reserved.  Copyright   © 2024 UpToDate, Inc. and/or its affiliates. All rights reserved.

## 2025-07-29 ENCOUNTER — TELEPHONE (OUTPATIENT)
Age: 30
End: 2025-07-29

## 2025-07-29 ENCOUNTER — APPOINTMENT (OUTPATIENT)
Dept: LAB | Facility: CLINIC | Age: 30
End: 2025-07-29
Payer: COMMERCIAL

## 2025-07-29 DIAGNOSIS — Z11.3 SCREENING FOR STDS (SEXUALLY TRANSMITTED DISEASES): ICD-10-CM

## 2025-07-29 DIAGNOSIS — B37.9 YEAST INFECTION: Primary | ICD-10-CM

## 2025-07-29 PROCEDURE — 36415 COLL VENOUS BLD VENIPUNCTURE: CPT

## 2025-07-29 PROCEDURE — 86803 HEPATITIS C AB TEST: CPT

## 2025-07-29 PROCEDURE — 87340 HEPATITIS B SURFACE AG IA: CPT

## 2025-07-29 PROCEDURE — 87389 HIV-1 AG W/HIV-1&-2 AB AG IA: CPT

## 2025-07-29 PROCEDURE — 86780 TREPONEMA PALLIDUM: CPT

## 2025-07-30 ENCOUNTER — TELEPHONE (OUTPATIENT)
Age: 30
End: 2025-07-30

## 2025-07-30 DIAGNOSIS — B37.9 YEAST INFECTION: ICD-10-CM

## 2025-07-30 LAB
HBV SURFACE AG SER QL: NORMAL
HCV AB SER QL: NORMAL
HIV 1+2 AB+HIV1 P24 AG SERPL QL IA: NORMAL
TREPONEMA PALLIDUM IGG+IGM AB [PRESENCE] IN SERUM OR PLASMA BY IMMUNOASSAY: NORMAL

## 2025-07-30 RX ORDER — NYSTATIN AND TRIAMCINOLONE ACETONIDE 100000; 1 [USP'U]/G; MG/G
OINTMENT TOPICAL 2 TIMES DAILY
Qty: 15 G | Refills: 0 | Status: SHIPPED | OUTPATIENT
Start: 2025-07-30

## 2025-07-30 RX ORDER — FLUCONAZOLE 150 MG/1
150 TABLET ORAL ONCE
Qty: 1 TABLET | Refills: 0 | Status: SHIPPED | OUTPATIENT
Start: 2025-07-30 | End: 2025-07-30

## 2025-07-30 RX ORDER — NYSTATIN AND TRIAMCINOLONE ACETONIDE 100000; 1 [USP'U]/G; MG/G
OINTMENT TOPICAL 2 TIMES DAILY
Qty: 15 G | Refills: 0 | Status: SHIPPED | OUTPATIENT
Start: 2025-07-30 | End: 2025-07-30